# Patient Record
Sex: FEMALE | Race: WHITE | Employment: PART TIME | URBAN - METROPOLITAN AREA
[De-identification: names, ages, dates, MRNs, and addresses within clinical notes are randomized per-mention and may not be internally consistent; named-entity substitution may affect disease eponyms.]

---

## 2017-01-01 ENCOUNTER — ONCOLOGY VISIT (OUTPATIENT)
Dept: ONCOLOGY | Facility: CLINIC | Age: 77
End: 2017-01-01
Attending: INTERNAL MEDICINE
Payer: MEDICARE

## 2017-01-01 ENCOUNTER — TELEPHONE (OUTPATIENT)
Dept: ONCOLOGY | Facility: CLINIC | Age: 77
End: 2017-01-01

## 2017-01-01 ENCOUNTER — HOSPITAL ENCOUNTER (INPATIENT)
Facility: CLINIC | Age: 77
LOS: 5 days | Discharge: NURSING, REHAB OR LONG-TERM CARE | DRG: 070 | End: 2017-02-23
Attending: EMERGENCY MEDICINE | Admitting: INTERNAL MEDICINE
Payer: MEDICARE

## 2017-01-01 ENCOUNTER — APPOINTMENT (OUTPATIENT)
Dept: PHYSICAL THERAPY | Facility: CLINIC | Age: 77
DRG: 070 | End: 2017-01-01
Payer: MEDICARE

## 2017-01-01 ENCOUNTER — OFFICE VISIT (OUTPATIENT)
Dept: NEUROSURGERY | Facility: CLINIC | Age: 77
End: 2017-01-01
Attending: NEUROLOGICAL SURGERY
Payer: MEDICARE

## 2017-01-01 ENCOUNTER — CARE COORDINATION (OUTPATIENT)
Dept: CARDIOLOGY | Facility: CLINIC | Age: 77
End: 2017-01-01

## 2017-01-01 ENCOUNTER — TRANSFERRED RECORDS (OUTPATIENT)
Dept: HEALTH INFORMATION MANAGEMENT | Facility: CLINIC | Age: 77
End: 2017-01-01

## 2017-01-01 ENCOUNTER — APPOINTMENT (OUTPATIENT)
Dept: PHYSICAL THERAPY | Facility: CLINIC | Age: 77
DRG: 070 | End: 2017-01-01
Attending: INTERNAL MEDICINE
Payer: MEDICARE

## 2017-01-01 ENCOUNTER — NURSING HOME VISIT (OUTPATIENT)
Dept: GERIATRICS | Facility: CLINIC | Age: 77
End: 2017-01-01
Payer: COMMERCIAL

## 2017-01-01 ENCOUNTER — APPOINTMENT (OUTPATIENT)
Dept: MRI IMAGING | Facility: CLINIC | Age: 77
DRG: 070 | End: 2017-01-01
Payer: MEDICARE

## 2017-01-01 ENCOUNTER — APPOINTMENT (OUTPATIENT)
Dept: LAB | Facility: CLINIC | Age: 77
End: 2017-01-01
Attending: INTERNAL MEDICINE
Payer: MEDICARE

## 2017-01-01 ENCOUNTER — APPOINTMENT (OUTPATIENT)
Dept: MRI IMAGING | Facility: CLINIC | Age: 77
DRG: 070 | End: 2017-01-01
Attending: EMERGENCY MEDICINE
Payer: MEDICARE

## 2017-01-01 ENCOUNTER — TELEPHONE (OUTPATIENT)
Dept: INTERNAL MEDICINE | Facility: CLINIC | Age: 77
End: 2017-01-01

## 2017-01-01 ENCOUNTER — CARE COORDINATION (OUTPATIENT)
Dept: ONCOLOGY | Facility: CLINIC | Age: 77
End: 2017-01-01

## 2017-01-01 ENCOUNTER — APPOINTMENT (OUTPATIENT)
Dept: SPEECH THERAPY | Facility: CLINIC | Age: 77
DRG: 070 | End: 2017-01-01
Payer: MEDICARE

## 2017-01-01 ENCOUNTER — APPOINTMENT (OUTPATIENT)
Dept: MRI IMAGING | Facility: CLINIC | Age: 77
DRG: 070 | End: 2017-01-01
Attending: INTERNAL MEDICINE
Payer: MEDICARE

## 2017-01-01 ENCOUNTER — DOCUMENTATION ONLY (OUTPATIENT)
Dept: CARE COORDINATION | Facility: CLINIC | Age: 77
End: 2017-01-01

## 2017-01-01 ENCOUNTER — EXTERNAL ORDER RESULTS (OUTPATIENT)
Dept: GERIATRICS | Facility: CLINIC | Age: 77
End: 2017-01-01

## 2017-01-01 ENCOUNTER — ONCOLOGY VISIT (OUTPATIENT)
Dept: ONCOLOGY | Facility: CLINIC | Age: 77
End: 2017-01-01
Attending: PHYSICIAN ASSISTANT
Payer: MEDICARE

## 2017-01-01 VITALS
BODY MASS INDEX: 15.57 KG/M2 | HEIGHT: 62 IN | RESPIRATION RATE: 19 BRPM | HEART RATE: 94 BPM | OXYGEN SATURATION: 97 % | WEIGHT: 84.6 LBS | TEMPERATURE: 97.5 F | SYSTOLIC BLOOD PRESSURE: 100 MMHG | DIASTOLIC BLOOD PRESSURE: 67 MMHG

## 2017-01-01 VITALS
DIASTOLIC BLOOD PRESSURE: 78 MMHG | BODY MASS INDEX: 17.3 KG/M2 | HEIGHT: 62 IN | SYSTOLIC BLOOD PRESSURE: 122 MMHG | RESPIRATION RATE: 18 BRPM | OXYGEN SATURATION: 95 % | WEIGHT: 94 LBS | HEART RATE: 90 BPM | TEMPERATURE: 97.8 F

## 2017-01-01 VITALS
WEIGHT: 95.1 LBS | SYSTOLIC BLOOD PRESSURE: 120 MMHG | BODY MASS INDEX: 17.11 KG/M2 | RESPIRATION RATE: 16 BRPM | OXYGEN SATURATION: 98 % | TEMPERATURE: 96.9 F | DIASTOLIC BLOOD PRESSURE: 61 MMHG | HEART RATE: 83 BPM

## 2017-01-01 VITALS
HEART RATE: 92 BPM | RESPIRATION RATE: 14 BRPM | BODY MASS INDEX: 15.18 KG/M2 | WEIGHT: 83 LBS | OXYGEN SATURATION: 93 % | SYSTOLIC BLOOD PRESSURE: 115 MMHG | DIASTOLIC BLOOD PRESSURE: 70 MMHG | TEMPERATURE: 97.5 F

## 2017-01-01 VITALS
RESPIRATION RATE: 22 BRPM | SYSTOLIC BLOOD PRESSURE: 112 MMHG | BODY MASS INDEX: 14.78 KG/M2 | HEART RATE: 88 BPM | DIASTOLIC BLOOD PRESSURE: 74 MMHG | OXYGEN SATURATION: 98 % | TEMPERATURE: 97.9 F | WEIGHT: 80.8 LBS

## 2017-01-01 VITALS
OXYGEN SATURATION: 97 % | TEMPERATURE: 99.1 F | DIASTOLIC BLOOD PRESSURE: 62 MMHG | SYSTOLIC BLOOD PRESSURE: 110 MMHG | WEIGHT: 98.2 LBS | BODY MASS INDEX: 17.66 KG/M2 | HEART RATE: 87 BPM

## 2017-01-01 VITALS
RESPIRATION RATE: 20 BRPM | OXYGEN SATURATION: 98 % | TEMPERATURE: 97.8 F | HEART RATE: 94 BPM | SYSTOLIC BLOOD PRESSURE: 125 MMHG | DIASTOLIC BLOOD PRESSURE: 78 MMHG

## 2017-01-01 VITALS
HEART RATE: 88 BPM | SYSTOLIC BLOOD PRESSURE: 131 MMHG | RESPIRATION RATE: 16 BRPM | TEMPERATURE: 98.1 F | DIASTOLIC BLOOD PRESSURE: 72 MMHG | OXYGEN SATURATION: 95 % | WEIGHT: 109.9 LBS | BODY MASS INDEX: 19.78 KG/M2

## 2017-01-01 VITALS
WEIGHT: 103.6 LBS | TEMPERATURE: 98.1 F | HEART RATE: 105 BPM | RESPIRATION RATE: 16 BRPM | HEIGHT: 62 IN | OXYGEN SATURATION: 100 % | SYSTOLIC BLOOD PRESSURE: 109 MMHG | DIASTOLIC BLOOD PRESSURE: 77 MMHG | BODY MASS INDEX: 19.06 KG/M2

## 2017-01-01 VITALS
DIASTOLIC BLOOD PRESSURE: 71 MMHG | BODY MASS INDEX: 17.3 KG/M2 | HEART RATE: 91 BPM | RESPIRATION RATE: 14 BRPM | TEMPERATURE: 97.8 F | OXYGEN SATURATION: 99 % | WEIGHT: 96.2 LBS | SYSTOLIC BLOOD PRESSURE: 111 MMHG

## 2017-01-01 VITALS — BODY MASS INDEX: 17.66 KG/M2 | WEIGHT: 98.2 LBS

## 2017-01-01 VITALS
TEMPERATURE: 98.9 F | WEIGHT: 74.4 LBS | OXYGEN SATURATION: 91 % | HEART RATE: 88 BPM | DIASTOLIC BLOOD PRESSURE: 49 MMHG | BODY MASS INDEX: 13.61 KG/M2 | SYSTOLIC BLOOD PRESSURE: 92 MMHG

## 2017-01-01 VITALS
TEMPERATURE: 97 F | DIASTOLIC BLOOD PRESSURE: 76 MMHG | WEIGHT: 86.7 LBS | SYSTOLIC BLOOD PRESSURE: 117 MMHG | RESPIRATION RATE: 16 BRPM | BODY MASS INDEX: 15.86 KG/M2 | OXYGEN SATURATION: 95 % | HEART RATE: 89 BPM

## 2017-01-01 VITALS
DIASTOLIC BLOOD PRESSURE: 66 MMHG | SYSTOLIC BLOOD PRESSURE: 109 MMHG | HEART RATE: 72 BPM | OXYGEN SATURATION: 96 % | HEIGHT: 62 IN | WEIGHT: 93.6 LBS | TEMPERATURE: 98.2 F | RESPIRATION RATE: 12 BRPM | BODY MASS INDEX: 17.23 KG/M2

## 2017-01-01 DIAGNOSIS — Z51.5 HOSPICE CARE: ICD-10-CM

## 2017-01-01 DIAGNOSIS — R53.81 PHYSICAL DECONDITIONING: ICD-10-CM

## 2017-01-01 DIAGNOSIS — C50.011 CANCER OF NIPPLE OF RIGHT BREAST (H): Primary | ICD-10-CM

## 2017-01-01 DIAGNOSIS — S50.822A: Primary | ICD-10-CM

## 2017-01-01 DIAGNOSIS — C79.51 BONE METASTASES: ICD-10-CM

## 2017-01-01 DIAGNOSIS — N39.0 URINARY TRACT INFECTION WITH HEMATURIA, SITE UNSPECIFIED: Primary | ICD-10-CM

## 2017-01-01 DIAGNOSIS — R40.0 SOMNOLENCE: ICD-10-CM

## 2017-01-01 DIAGNOSIS — Z51.11 ENCOUNTER FOR ANTINEOPLASTIC CHEMOTHERAPY: ICD-10-CM

## 2017-01-01 DIAGNOSIS — C79.51 BONE METASTASES: Primary | ICD-10-CM

## 2017-01-01 DIAGNOSIS — R40.0 SOMNOLENCE: Primary | ICD-10-CM

## 2017-01-01 DIAGNOSIS — R62.7 ADULT FAILURE TO THRIVE: ICD-10-CM

## 2017-01-01 DIAGNOSIS — C50.011 CANCER OF NIPPLE OF RIGHT BREAST (H): ICD-10-CM

## 2017-01-01 DIAGNOSIS — C79.31 BRAIN METASTASES: ICD-10-CM

## 2017-01-01 DIAGNOSIS — C50.919 METASTATIC BREAST CANCER: Primary | ICD-10-CM

## 2017-01-01 DIAGNOSIS — M62.81 GENERALIZED MUSCLE WEAKNESS: ICD-10-CM

## 2017-01-01 DIAGNOSIS — Z79.899 ENCOUNTER FOR LONG-TERM CURRENT USE OF MEDICATION: ICD-10-CM

## 2017-01-01 DIAGNOSIS — D61.818 PANCYTOPENIA (H): ICD-10-CM

## 2017-01-01 DIAGNOSIS — S50.322A: Primary | ICD-10-CM

## 2017-01-01 DIAGNOSIS — K59.00 CONSTIPATION, UNSPECIFIED CONSTIPATION TYPE: ICD-10-CM

## 2017-01-01 DIAGNOSIS — R53.1 WEAKNESS OF LEFT SIDE OF BODY: ICD-10-CM

## 2017-01-01 DIAGNOSIS — C79.31 BRAIN METASTASES: Primary | ICD-10-CM

## 2017-01-01 DIAGNOSIS — C79.31 METASTASIS TO BRAIN (H): ICD-10-CM

## 2017-01-01 DIAGNOSIS — Z79.899 NEED FOR PROPHYLACTIC CHEMOTHERAPY: ICD-10-CM

## 2017-01-01 DIAGNOSIS — R53.1 ACUTE RIGHT-SIDED WEAKNESS: ICD-10-CM

## 2017-01-01 DIAGNOSIS — N30.01 ACUTE CYSTITIS WITH HEMATURIA: ICD-10-CM

## 2017-01-01 DIAGNOSIS — G40.909 SEIZURE DISORDER (H): ICD-10-CM

## 2017-01-01 DIAGNOSIS — C50.911 DUCTAL CARCINOMA OF BREAST, RIGHT (H): ICD-10-CM

## 2017-01-01 DIAGNOSIS — R11.2 CHEMOTHERAPY INDUCED NAUSEA AND VOMITING: ICD-10-CM

## 2017-01-01 DIAGNOSIS — I10 BENIGN ESSENTIAL HYPERTENSION: ICD-10-CM

## 2017-01-01 DIAGNOSIS — S61.219A CUT OF FINGER: ICD-10-CM

## 2017-01-01 DIAGNOSIS — C79.51 BONE METASTASIS: ICD-10-CM

## 2017-01-01 DIAGNOSIS — R31.9 URINARY TRACT INFECTION WITH HEMATURIA, SITE UNSPECIFIED: Primary | ICD-10-CM

## 2017-01-01 DIAGNOSIS — G62.9 NEUROPATHY: ICD-10-CM

## 2017-01-01 DIAGNOSIS — C50.911 MALIGNANT NEOPLASM OF RIGHT FEMALE BREAST, UNSPECIFIED SITE OF BREAST: ICD-10-CM

## 2017-01-01 DIAGNOSIS — S60.822A: Primary | ICD-10-CM

## 2017-01-01 DIAGNOSIS — R41.0 CONFUSION: ICD-10-CM

## 2017-01-01 DIAGNOSIS — L08.9: Primary | ICD-10-CM

## 2017-01-01 DIAGNOSIS — N30.01 ACUTE CYSTITIS WITH HEMATURIA: Primary | ICD-10-CM

## 2017-01-01 DIAGNOSIS — R62.7 FAILURE TO THRIVE IN ADULT: ICD-10-CM

## 2017-01-01 DIAGNOSIS — C50.911 BREAST CANCER, RIGHT BREAST (H): ICD-10-CM

## 2017-01-01 DIAGNOSIS — T45.1X5A CHEMOTHERAPY INDUCED NAUSEA AND VOMITING: ICD-10-CM

## 2017-01-01 LAB
ALBUMIN SERPL-MCNC: 3 G/DL (ref 3.4–5)
ALBUMIN SERPL-MCNC: 3.1 G/DL (ref 3.4–5)
ALBUMIN UR-MCNC: 10 MG/DL
ALP SERPL-CCNC: 64 U/L (ref 40–150)
ALP SERPL-CCNC: 64 U/L (ref 40–150)
ALP SERPL-CCNC: 68 U/L (ref 40–150)
ALP SERPL-CCNC: 80 U/L (ref 40–150)
ALT SERPL W P-5'-P-CCNC: 22 U/L (ref 0–50)
ALT SERPL W P-5'-P-CCNC: 25 U/L (ref 0–50)
ALT SERPL W P-5'-P-CCNC: 28 U/L (ref 0–50)
ALT SERPL W P-5'-P-CCNC: 37 U/L (ref 0–50)
ANION GAP SERPL CALCULATED.3IONS-SCNC: 7 MMOL/L (ref 3–14)
ANION GAP SERPL CALCULATED.3IONS-SCNC: 8 MMOL/L (ref 3–14)
ANION GAP SERPL CALCULATED.3IONS-SCNC: 9 MMOL/L (ref 3–14)
APPEARANCE CSF: CLEAR
APPEARANCE UR: ABNORMAL
AST SERPL W P-5'-P-CCNC: 19 U/L (ref 0–45)
AST SERPL W P-5'-P-CCNC: 20 U/L (ref 0–45)
AST SERPL W P-5'-P-CCNC: 22 U/L (ref 0–45)
AST SERPL W P-5'-P-CCNC: 27 U/L (ref 0–45)
BACTERIA #/AREA URNS HPF: ABNORMAL /HPF
BACTERIA SPEC CULT: NO GROWTH
BACTERIA SPEC CULT: NO GROWTH
BASOPHILS # BLD AUTO: 0 10E9/L (ref 0–0.2)
BASOPHILS NFR BLD AUTO: 0 %
BASOPHILS NFR BLD AUTO: 0.2 %
BASOPHILS NFR BLD AUTO: 0.3 %
BASOPHILS NFR BLD AUTO: 0.3 %
BASOPHILS NFR BLD AUTO: 0.4 %
BASOPHILS NFR BLD AUTO: 0.5 %
BASOPHILS NFR BLD AUTO: 0.6 %
BILIRUB SERPL-MCNC: 0.8 MG/DL (ref 0.2–1.3)
BILIRUB SERPL-MCNC: 0.9 MG/DL (ref 0.2–1.3)
BILIRUB SERPL-MCNC: 0.9 MG/DL (ref 0.2–1.3)
BILIRUB SERPL-MCNC: 1.2 MG/DL (ref 0.2–1.3)
BILIRUB UR QL STRIP: NEGATIVE
BUN SERPL-MCNC: 12 MG/DL (ref 7–30)
BUN SERPL-MCNC: 13 MG/DL (ref 7–30)
BUN SERPL-MCNC: 15 MG/DL (ref 7–30)
BUN SERPL-MCNC: 15 MG/DL (ref 7–30)
BUN SERPL-MCNC: 21 MG/DL (ref 7–30)
BUN SERPL-MCNC: 21 MG/DL (ref 9–26)
BUN SERPL-MCNC: 3 MG/DL (ref 7–30)
BUN SERPL-MCNC: 4 MG/DL (ref 7–30)
BUN SERPL-MCNC: 5 MG/DL (ref 7–30)
BUN SERPL-MCNC: 8 MG/DL (ref 7–30)
CALCIUM SERPL-MCNC: 7.2 MG/DL (ref 8.5–10.1)
CALCIUM SERPL-MCNC: 7.5 MG/DL (ref 8.5–10.1)
CALCIUM SERPL-MCNC: 7.8 MG/DL (ref 8.5–10.1)
CALCIUM SERPL-MCNC: 7.9 MG/DL (ref 8.5–10.1)
CALCIUM SERPL-MCNC: 8.5 MG/DL (ref 8.5–10.1)
CALCIUM SERPL-MCNC: 8.7 MG/DL (ref 8.5–10.1)
CALCIUM SERPL-MCNC: 9 MG/DL (ref 8.4–10.2)
CALCIUM SERPL-MCNC: 9 MG/DL (ref 8.5–10.1)
CANCER AG27-29 SERPL-ACNC: 60 U/ML (ref 0–39)
CANCER AG27-29 SERPL-ACNC: 62 U/ML (ref 0–39)
CANCER AG27-29 SERPL-ACNC: 66 U/ML (ref 0–39)
CANCER AG27-29 SERPL-ACNC: 91 U/ML (ref 0–39)
CD3 CELLS # BLD: 307 CELLS/UL (ref 603–2990)
CD3 CELLS NFR BLD: 91 % (ref 49–84)
CD3+CD4+ CELLS # BLD: 214 CELLS/UL (ref 441–2156)
CD3+CD4+ CELLS NFR BLD: 63 % (ref 28–63)
CD3+CD4+ CELLS/CD3+CD8+ CLL BLD: 2.33 % (ref 1.4–2.6)
CD3+CD8+ CELLS # BLD: 90 CELLS/UL (ref 125–1312)
CD3+CD8+ CELLS NFR BLD: 27 % (ref 10–40)
CEA SERPL-MCNC: 5.5 UG/L (ref 0–2.5)
CEA SERPL-MCNC: 6.6 UG/L (ref 0–2.5)
CEA SERPL-MCNC: 7.3 UG/L (ref 0–2.5)
CEA SERPL-MCNC: 8.8 UG/L (ref 0–2.5)
CHLORIDE SERPL-SCNC: 108 MMOL/L (ref 94–109)
CHLORIDE SERPL-SCNC: 109 MMOL/L (ref 94–109)
CHLORIDE SERPL-SCNC: 110 MMOL/L (ref 94–109)
CHLORIDE SERPL-SCNC: 112 MMOL/L (ref 94–109)
CHLORIDE SERPL-SCNC: 112 MMOL/L (ref 94–109)
CHLORIDE SERPL-SCNC: 113 MMOL/L (ref 94–109)
CHLORIDE SERPL-SCNC: 116 MMOL/L (ref 94–109)
CHLORIDE SERPLBLD-SCNC: 111 MMOL/L (ref 98–109)
CO2 SERPL-SCNC: 20 MMOL/L (ref 20–32)
CO2 SERPL-SCNC: 22 MMOL/L (ref 20–32)
CO2 SERPL-SCNC: 23 MMOL/L (ref 20–32)
CO2 SERPL-SCNC: 23 MMOL/L (ref 22–31)
CO2 SERPL-SCNC: 24 MMOL/L (ref 20–32)
CO2 SERPL-SCNC: 25 MMOL/L (ref 20–32)
CO2 SERPL-SCNC: 26 MMOL/L (ref 20–32)
CO2 SERPL-SCNC: 27 MMOL/L (ref 20–32)
CO2 SERPL-SCNC: 27 MMOL/L (ref 20–32)
CO2 SERPL-SCNC: 28 MMOL/L (ref 20–32)
COLOR CSF: COLORLESS
COLOR UR AUTO: YELLOW
COPATH REPORT: NORMAL
CREAT SERPL-MCNC: 0.33 MG/DL (ref 0.52–1.04)
CREAT SERPL-MCNC: 0.34 MG/DL (ref 0.52–1.04)
CREAT SERPL-MCNC: 0.35 MG/DL (ref 0.52–1.04)
CREAT SERPL-MCNC: 0.36 MG/DL (ref 0.52–1.04)
CREAT SERPL-MCNC: 0.37 MG/DL (ref 0.52–1.04)
CREAT SERPL-MCNC: 0.38 MG/DL (ref 0.52–1.04)
CREAT SERPL-MCNC: 0.39 MG/DL (ref 0.52–1.04)
CREAT SERPL-MCNC: 0.4 MG/DL (ref 0.52–1.04)
CREAT SERPL-MCNC: 0.4 MG/DL (ref 0.55–1.02)
CREAT SERPL-MCNC: 0.52 MG/DL (ref 0.52–1.04)
DIFFERENTIAL METHOD BLD: ABNORMAL
EOSINOPHIL # BLD AUTO: 0 10E9/L (ref 0–0.7)
EOSINOPHIL # BLD AUTO: 0.1 10E9/L (ref 0–0.7)
EOSINOPHIL NFR BLD AUTO: 1 %
EOSINOPHIL NFR BLD AUTO: 1.1 %
EOSINOPHIL NFR BLD AUTO: 1.3 %
EOSINOPHIL NFR BLD AUTO: 1.3 %
EOSINOPHIL NFR BLD AUTO: 1.8 %
EOSINOPHIL NFR BLD AUTO: 2.3 %
EOSINOPHIL NFR BLD AUTO: 2.7 %
ERYTHROCYTE [DISTWIDTH] IN BLOOD BY AUTOMATED COUNT: 15.3 % (ref 10–15)
ERYTHROCYTE [DISTWIDTH] IN BLOOD BY AUTOMATED COUNT: 16.1 % (ref 11–15)
ERYTHROCYTE [DISTWIDTH] IN BLOOD BY AUTOMATED COUNT: 16.8 % (ref 10–15)
ERYTHROCYTE [DISTWIDTH] IN BLOOD BY AUTOMATED COUNT: 16.9 % (ref 10–15)
ERYTHROCYTE [DISTWIDTH] IN BLOOD BY AUTOMATED COUNT: 17 % (ref 10–15)
ERYTHROCYTE [DISTWIDTH] IN BLOOD BY AUTOMATED COUNT: 17.1 % (ref 10–15)
ERYTHROCYTE [DISTWIDTH] IN BLOOD BY AUTOMATED COUNT: 17.5 % (ref 10–15)
ERYTHROCYTE [DISTWIDTH] IN BLOOD BY AUTOMATED COUNT: 17.9 % (ref 10–15)
ERYTHROCYTE [DISTWIDTH] IN BLOOD BY AUTOMATED COUNT: 18 % (ref 10–15)
ERYTHROCYTE [DISTWIDTH] IN BLOOD BY AUTOMATED COUNT: 19.3 % (ref 10–15)
GFR SERPL CREATININE-BSD FRML MDRD: >60 ML/MIN/1.73M2
GFR SERPL CREATININE-BSD FRML MDRD: ABNORMAL ML/MIN/1.7M2
GLUCOSE BLDC GLUCOMTR-MCNC: 84 MG/DL (ref 70–99)
GLUCOSE CSF-MCNC: 49 MG/DL (ref 40–70)
GLUCOSE SERPL-MCNC: 105 MG/DL (ref 70–100)
GLUCOSE SERPL-MCNC: 111 MG/DL (ref 70–99)
GLUCOSE SERPL-MCNC: 123 MG/DL (ref 70–99)
GLUCOSE SERPL-MCNC: 130 MG/DL (ref 70–99)
GLUCOSE SERPL-MCNC: 137 MG/DL (ref 70–99)
GLUCOSE SERPL-MCNC: 76 MG/DL (ref 70–99)
GLUCOSE SERPL-MCNC: 84 MG/DL (ref 70–99)
GLUCOSE SERPL-MCNC: 88 MG/DL (ref 70–99)
GLUCOSE SERPL-MCNC: 92 MG/DL (ref 70–99)
GLUCOSE SERPL-MCNC: 94 MG/DL (ref 70–99)
GLUCOSE UR STRIP-MCNC: NEGATIVE MG/DL
GRAM STN SPEC: NORMAL
HCT VFR BLD AUTO: 22.9 % (ref 35–47)
HCT VFR BLD AUTO: 23.5 % (ref 35–47)
HCT VFR BLD AUTO: 24.9 % (ref 35–47)
HCT VFR BLD AUTO: 26.5 % (ref 35–47)
HCT VFR BLD AUTO: 27.4 % (ref 35–47)
HCT VFR BLD AUTO: 27.5 % (ref 35–47)
HCT VFR BLD AUTO: 27.6 % (ref 35–47)
HCT VFR BLD AUTO: 31.9 % (ref 35–47)
HCT VFR BLD AUTO: 32.9 % (ref 35–46)
HCT VFR BLD AUTO: 34.1 % (ref 35–47)
HEMOGLOBIN: 10.6 G/DL (ref 11.8–15.5)
HGB BLD-MCNC: 10.9 G/DL (ref 11.7–15.7)
HGB BLD-MCNC: 11 G/DL (ref 11.7–15.7)
HGB BLD-MCNC: 7.5 G/DL (ref 11.7–15.7)
HGB BLD-MCNC: 7.8 G/DL (ref 11.7–15.7)
HGB BLD-MCNC: 8.4 G/DL (ref 11.7–15.7)
HGB BLD-MCNC: 8.8 G/DL (ref 11.7–15.7)
HGB BLD-MCNC: 9 G/DL (ref 11.7–15.7)
HGB BLD-MCNC: 9.1 G/DL (ref 11.7–15.7)
HGB BLD-MCNC: 9.3 G/DL (ref 11.7–15.7)
HGB UR QL STRIP: ABNORMAL
HIV 1+2 AB+HIV1 P24 AG SERPL QL IA: NORMAL
HSV1 DNA CSF QL NAA+PROBE: NORMAL
HSV2 DNA CSF QL NAA+PROBE: NORMAL
HYALINE CASTS #/AREA URNS LPF: 1 /LPF (ref 0–2)
IFC SPECIMEN: ABNORMAL
IMM GRANULOCYTES # BLD: 0 10E9/L (ref 0–0.4)
IMM GRANULOCYTES NFR BLD: 0.3 %
IMM GRANULOCYTES NFR BLD: 0.4 %
IMM GRANULOCYTES NFR BLD: 0.5 %
IMM GRANULOCYTES NFR BLD: 0.5 %
IMM GRANULOCYTES NFR BLD: 0.6 %
IMM GRANULOCYTES NFR BLD: 0.6 %
IMM GRANULOCYTES NFR BLD: 0.7 %
IMMUNODEFICIENCY MARKERS SPEC-IMP: ABNORMAL
INR PPP: 1.08 (ref 0.86–1.14)
KETONES UR STRIP-MCNC: 10 MG/DL
LEUKOCYTE ESTERASE UR QL STRIP: ABNORMAL
LYMPHOCYTES # BLD AUTO: 0.3 10E9/L (ref 0.8–5.3)
LYMPHOCYTES # BLD AUTO: 0.3 10E9/L (ref 0.8–5.3)
LYMPHOCYTES # BLD AUTO: 0.4 10E9/L (ref 0.8–5.3)
LYMPHOCYTES # BLD AUTO: 0.5 10E9/L (ref 0.8–5.3)
LYMPHOCYTES NFR BLD AUTO: 10.3 %
LYMPHOCYTES NFR BLD AUTO: 11.8 %
LYMPHOCYTES NFR BLD AUTO: 12.9 %
LYMPHOCYTES NFR BLD AUTO: 13.1 %
LYMPHOCYTES NFR BLD AUTO: 15.7 %
LYMPHOCYTES NFR BLD AUTO: 19.6 %
LYMPHOCYTES NFR BLD AUTO: 9.7 %
MCH RBC QN AUTO: 32 PG (ref 26.5–33)
MCH RBC QN AUTO: 32.7 PG (ref 26.5–33)
MCH RBC QN AUTO: 32.9 PG (ref 26.5–33)
MCH RBC QN AUTO: 32.9 PG (ref 26.5–33)
MCH RBC QN AUTO: 33 PG (ref 26.5–33)
MCH RBC QN AUTO: 33.6 PG (ref 26.5–33)
MCH RBC QN AUTO: 33.6 PG (ref 26.5–33)
MCH RBC QN AUTO: 33.7 PG (ref 26.5–33)
MCH RBC QN AUTO: 33.8 PG (ref 26.5–33)
MCHC RBC AUTO-ENTMCNC: 32.3 G/DL (ref 31.5–36.5)
MCHC RBC AUTO-ENTMCNC: 32.6 G/DL (ref 31.5–36.5)
MCHC RBC AUTO-ENTMCNC: 32.8 G/DL (ref 31.5–36.5)
MCHC RBC AUTO-ENTMCNC: 33.2 G/DL (ref 31.5–36.5)
MCHC RBC AUTO-ENTMCNC: 33.7 G/DL (ref 31.5–36.5)
MCHC RBC AUTO-ENTMCNC: 33.8 G/DL (ref 31.5–36.5)
MCHC RBC AUTO-ENTMCNC: 34.2 G/DL (ref 31.5–36.5)
MCV RBC AUTO: 100 FL (ref 78–100)
MCV RBC AUTO: 100 FL (ref 78–100)
MCV RBC AUTO: 100.6 FL (ref 80–100)
MCV RBC AUTO: 101 FL (ref 78–100)
MCV RBC AUTO: 102 FL (ref 78–100)
MCV RBC AUTO: 102 FL (ref 78–100)
MCV RBC AUTO: 94 FL (ref 78–100)
MCV RBC AUTO: 97 FL (ref 78–100)
MICRO REPORT STATUS: NORMAL
MICROBIOLOGIST REVIEW: NORMAL
MONOCYTES # BLD AUTO: 0.4 10E9/L (ref 0–1.3)
MONOCYTES # BLD AUTO: 0.5 10E9/L (ref 0–1.3)
MONOCYTES NFR BLD AUTO: 11 %
MONOCYTES NFR BLD AUTO: 12.9 %
MONOCYTES NFR BLD AUTO: 13.2 %
MONOCYTES NFR BLD AUTO: 13.5 %
MONOCYTES NFR BLD AUTO: 14.2 %
MONOCYTES NFR BLD AUTO: 20.7 %
MONOCYTES NFR BLD AUTO: 23.8 %
MUCOUS THREADS #/AREA URNS LPF: PRESENT /LPF
NEUTROPHILS # BLD AUTO: 1.1 10E9/L (ref 1.6–8.3)
NEUTROPHILS # BLD AUTO: 1.4 10E9/L (ref 1.6–8.3)
NEUTROPHILS # BLD AUTO: 1.8 10E9/L (ref 1.6–8.3)
NEUTROPHILS # BLD AUTO: 2.1 10E9/L (ref 1.6–8.3)
NEUTROPHILS # BLD AUTO: 2.1 10E9/L (ref 1.6–8.3)
NEUTROPHILS # BLD AUTO: 2.3 10E9/L (ref 1.6–8.3)
NEUTROPHILS # BLD AUTO: 3.6 10E9/L (ref 1.6–8.3)
NEUTROPHILS NFR BLD AUTO: 53.3 %
NEUTROPHILS NFR BLD AUTO: 63.9 %
NEUTROPHILS NFR BLD AUTO: 68.2 %
NEUTROPHILS NFR BLD AUTO: 71.4 %
NEUTROPHILS NFR BLD AUTO: 72.2 %
NEUTROPHILS NFR BLD AUTO: 74 %
NEUTROPHILS NFR BLD AUTO: 77.2 %
NITRATE UR QL: NEGATIVE
NRBC # BLD AUTO: 0 10*3/UL
NRBC BLD AUTO-RTO: 0 /100
NRBC BLD AUTO-RTO: 1 /100
PH UR STRIP: 5.5 PH (ref 5–7)
PLATELET # BLD AUTO: 107 10E9/L (ref 150–450)
PLATELET # BLD AUTO: 115 10E9/L (ref 150–450)
PLATELET # BLD AUTO: 123 10E9/L (ref 150–450)
PLATELET # BLD AUTO: 158 K/CMM (ref 140–450)
PLATELET # BLD AUTO: 203 10E9/L (ref 150–450)
PLATELET # BLD AUTO: 48 10E9/L (ref 150–450)
PLATELET # BLD AUTO: 56 10E9/L (ref 150–450)
PLATELET # BLD AUTO: 56 10E9/L (ref 150–450)
PLATELET # BLD AUTO: 57 10E9/L (ref 150–450)
PLATELET # BLD AUTO: 68 10E9/L (ref 150–450)
PLATELET # BLD AUTO: 81 10E9/L (ref 150–450)
PLATELET # BLD EST: ABNORMAL 10*3/UL
PLATELET # BLD EST: ABNORMAL 10*3/UL
POTASSIUM SERPL-SCNC: 3.2 MMOL/L (ref 3.4–5.3)
POTASSIUM SERPL-SCNC: 3.6 MMOL/L (ref 3.4–5.3)
POTASSIUM SERPL-SCNC: 3.7 MMOL/L (ref 3.4–5.3)
POTASSIUM SERPL-SCNC: 3.8 MMOL/L (ref 3.4–5.3)
POTASSIUM SERPL-SCNC: 3.9 MMOL/L (ref 3.4–5.3)
POTASSIUM SERPL-SCNC: 3.9 MMOL/L (ref 3.4–5.3)
POTASSIUM SERPL-SCNC: 4.1 MMOL/L (ref 3.5–5.2)
PROT CSF-MCNC: 46 MG/DL (ref 15–60)
PROT SERPL-MCNC: 5.9 G/DL (ref 6.8–8.8)
PROT SERPL-MCNC: 6 G/DL (ref 6.8–8.8)
PROT SERPL-MCNC: 6 G/DL (ref 6.8–8.8)
PROT SERPL-MCNC: 6.2 G/DL (ref 6.8–8.8)
RBC # BLD AUTO: 2.28 10E12/L (ref 3.8–5.2)
RBC # BLD AUTO: 2.32 10E12/L (ref 3.8–5.2)
RBC # BLD AUTO: 2.49 10E12/L (ref 3.8–5.2)
RBC # BLD AUTO: 2.62 10E12/L (ref 3.8–5.2)
RBC # BLD AUTO: 2.69 10E12/L (ref 3.8–5.2)
RBC # BLD AUTO: 2.73 10E12/L (ref 3.8–5.2)
RBC # BLD AUTO: 2.84 10E12/L (ref 3.8–5.2)
RBC # BLD AUTO: 3.27 M/CMM (ref 3.7–5.2)
RBC # BLD AUTO: 3.34 10E12/L (ref 3.8–5.2)
RBC # BLD AUTO: 3.41 10E12/L (ref 3.8–5.2)
RBC # CSF MANUAL: 0 /UL (ref 0–2)
RBC #/AREA URNS AUTO: 3 /HPF (ref 0–2)
SODIUM SERPL-SCNC: 143 MMOL/L (ref 133–144)
SODIUM SERPL-SCNC: 143 MMOL/L (ref 133–144)
SODIUM SERPL-SCNC: 143 MMOL/L (ref 136–145)
SODIUM SERPL-SCNC: 144 MMOL/L (ref 133–144)
SODIUM SERPL-SCNC: 145 MMOL/L (ref 133–144)
SODIUM SERPL-SCNC: 145 MMOL/L (ref 133–144)
SP GR UR STRIP: 1.01 (ref 1–1.03)
SPECIMEN SOURCE: NORMAL
SQUAMOUS #/AREA URNS AUTO: <1 /HPF (ref 0–1)
TUBE # CSF: 4 #
URN SPEC COLLECT METH UR: ABNORMAL
UROBILINOGEN UR STRIP-MCNC: NORMAL MG/DL (ref 0–2)
WBC # BLD AUTO: 1.8 10E9/L (ref 4–11)
WBC # BLD AUTO: 2.1 10E9/L (ref 4–11)
WBC # BLD AUTO: 2.1 K/CMM (ref 3.8–11)
WBC # BLD AUTO: 2.2 10E9/L (ref 4–11)
WBC # BLD AUTO: 2.3 10E9/L (ref 4–11)
WBC # BLD AUTO: 2.6 10E9/L (ref 4–11)
WBC # BLD AUTO: 2.9 10E9/L (ref 4–11)
WBC # BLD AUTO: 3 10E9/L (ref 4–11)
WBC # BLD AUTO: 3.1 10E9/L (ref 4–11)
WBC # BLD AUTO: 4.6 10E9/L (ref 4–11)
WBC # CSF MANUAL: 0 /UL (ref 0–5)
WBC #/AREA URNS AUTO: >182 /HPF (ref 0–2)
WBC CLUMPS #/AREA URNS HPF: PRESENT /HPF
ZINC SERPL-MCNC: 53 UG/ML

## 2017-01-01 PROCEDURE — 99232 SBSQ HOSP IP/OBS MODERATE 35: CPT | Performed by: INTERNAL MEDICINE

## 2017-01-01 PROCEDURE — 25000125 ZZHC RX 250: Mod: ZF | Performed by: INTERNAL MEDICINE

## 2017-01-01 PROCEDURE — 85027 COMPLETE CBC AUTOMATED: CPT | Performed by: INTERNAL MEDICINE

## 2017-01-01 PROCEDURE — 25000125 ZZHC RX 250: Performed by: INTERNAL MEDICINE

## 2017-01-01 PROCEDURE — 80048 BASIC METABOLIC PNL TOTAL CA: CPT | Performed by: EMERGENCY MEDICINE

## 2017-01-01 PROCEDURE — 96367 TX/PROPH/DG ADDL SEQ IV INF: CPT

## 2017-01-01 PROCEDURE — 40000193 ZZH STATISTIC PT WARD VISIT

## 2017-01-01 PROCEDURE — 81001 URINALYSIS AUTO W/SCOPE: CPT | Performed by: INTERNAL MEDICINE

## 2017-01-01 PROCEDURE — 99207 ZZC CDG-CORRECTLY CODED, REVIEWED AND AGREE: CPT | Performed by: NURSE PRACTITIONER

## 2017-01-01 PROCEDURE — A9270 NON-COVERED ITEM OR SERVICE: HCPCS | Mod: GY | Performed by: INTERNAL MEDICINE

## 2017-01-01 PROCEDURE — 99212 OFFICE O/P EST SF 10 MIN: CPT

## 2017-01-01 PROCEDURE — 25500064 ZZH RX 255 OP 636: Performed by: EMERGENCY MEDICINE

## 2017-01-01 PROCEDURE — 80053 COMPREHEN METABOLIC PANEL: CPT | Performed by: INTERNAL MEDICINE

## 2017-01-01 PROCEDURE — 99306 1ST NF CARE HIGH MDM 50: CPT | Mod: GV | Performed by: INTERNAL MEDICINE

## 2017-01-01 PROCEDURE — 80048 BASIC METABOLIC PNL TOTAL CA: CPT | Performed by: INTERNAL MEDICINE

## 2017-01-01 PROCEDURE — 88312 SPECIAL STAINS GROUP 1: CPT | Performed by: INTERNAL MEDICINE

## 2017-01-01 PROCEDURE — 99308 SBSQ NF CARE LOW MDM 20: CPT | Mod: GW | Performed by: NURSE PRACTITIONER

## 2017-01-01 PROCEDURE — 72157 MRI CHEST SPINE W/O & W/DYE: CPT

## 2017-01-01 PROCEDURE — 96372 THER/PROPH/DIAG INJ SC/IM: CPT | Mod: 59

## 2017-01-01 PROCEDURE — 70553 MRI BRAIN STEM W/O & W/DYE: CPT

## 2017-01-01 PROCEDURE — 96417 CHEMO IV INFUS EACH ADDL SEQ: CPT

## 2017-01-01 PROCEDURE — 25000128 H RX IP 250 OP 636: Mod: ZF | Performed by: PHYSICIAN ASSISTANT

## 2017-01-01 PROCEDURE — 87086 URINE CULTURE/COLONY COUNT: CPT | Performed by: INTERNAL MEDICINE

## 2017-01-01 PROCEDURE — 25000128 H RX IP 250 OP 636: Performed by: EMERGENCY MEDICINE

## 2017-01-01 PROCEDURE — 86300 IMMUNOASSAY TUMOR CA 15-3: CPT | Performed by: INTERNAL MEDICINE

## 2017-01-01 PROCEDURE — 97530 THERAPEUTIC ACTIVITIES: CPT | Mod: GP

## 2017-01-01 PROCEDURE — 25000128 H RX IP 250 OP 636: Performed by: PHYSICIAN ASSISTANT

## 2017-01-01 PROCEDURE — 99285 EMERGENCY DEPT VISIT HI MDM: CPT | Mod: Z6 | Performed by: EMERGENCY MEDICINE

## 2017-01-01 PROCEDURE — 36592 COLLECT BLOOD FROM PICC: CPT | Performed by: INTERNAL MEDICINE

## 2017-01-01 PROCEDURE — 85025 COMPLETE CBC W/AUTO DIFF WBC: CPT | Performed by: INTERNAL MEDICINE

## 2017-01-01 PROCEDURE — 87186 SC STD MICRODIL/AGAR DIL: CPT | Performed by: INTERNAL MEDICINE

## 2017-01-01 PROCEDURE — A9270 NON-COVERED ITEM OR SERVICE: HCPCS | Mod: GY | Performed by: PHYSICIAN ASSISTANT

## 2017-01-01 PROCEDURE — 88108 CYTOPATH CONCENTRATE TECH: CPT | Performed by: INTERNAL MEDICINE

## 2017-01-01 PROCEDURE — 85025 COMPLETE CBC W/AUTO DIFF WBC: CPT | Performed by: PHYSICIAN ASSISTANT

## 2017-01-01 PROCEDURE — 25000132 ZZH RX MED GY IP 250 OP 250 PS 637: Mod: GY | Performed by: PHYSICIAN ASSISTANT

## 2017-01-01 PROCEDURE — 99207 ZZC CDG-UP CODE -MED NECESSITY: CPT | Performed by: NURSE PRACTITIONER

## 2017-01-01 PROCEDURE — 85610 PROTHROMBIN TIME: CPT | Performed by: INTERNAL MEDICINE

## 2017-01-01 PROCEDURE — A9585 GADOBUTROL INJECTION: HCPCS | Performed by: RADIOLOGY

## 2017-01-01 PROCEDURE — 99207 ZZC CDG-CORRECTLY CODED, REVIEWED AND AGREE: CPT | Performed by: INTERNAL MEDICINE

## 2017-01-01 PROCEDURE — 96365 THER/PROPH/DIAG IV INF INIT: CPT | Performed by: EMERGENCY MEDICINE

## 2017-01-01 PROCEDURE — 36415 COLL VENOUS BLD VENIPUNCTURE: CPT | Performed by: PHYSICIAN ASSISTANT

## 2017-01-01 PROCEDURE — 89050 BODY FLUID CELL COUNT: CPT | Performed by: INTERNAL MEDICINE

## 2017-01-01 PROCEDURE — 80048 BASIC METABOLIC PNL TOTAL CA: CPT | Performed by: PHYSICIAN ASSISTANT

## 2017-01-01 PROCEDURE — 99238 HOSP IP/OBS DSCHRG MGMT 30/<: CPT | Performed by: INTERNAL MEDICINE

## 2017-01-01 PROCEDURE — 99215 OFFICE O/P EST HI 40 MIN: CPT | Mod: ZP | Performed by: INTERNAL MEDICINE

## 2017-01-01 PROCEDURE — 86359 T CELLS TOTAL COUNT: CPT | Performed by: INTERNAL MEDICINE

## 2017-01-01 PROCEDURE — 99214 OFFICE O/P EST MOD 30 MIN: CPT | Mod: ZP | Performed by: INTERNAL MEDICINE

## 2017-01-01 PROCEDURE — 96361 HYDRATE IV INFUSION ADD-ON: CPT | Performed by: EMERGENCY MEDICINE

## 2017-01-01 PROCEDURE — 12000008 ZZH R&B INTERMEDIATE UMMC

## 2017-01-01 PROCEDURE — 82378 CARCINOEMBRYONIC ANTIGEN: CPT | Performed by: INTERNAL MEDICINE

## 2017-01-01 PROCEDURE — 72158 MRI LUMBAR SPINE W/O & W/DYE: CPT

## 2017-01-01 PROCEDURE — 87040 BLOOD CULTURE FOR BACTERIA: CPT | Performed by: INTERNAL MEDICINE

## 2017-01-01 PROCEDURE — 96413 CHEMO IV INFUSION 1 HR: CPT

## 2017-01-01 PROCEDURE — 99309 SBSQ NF CARE MODERATE MDM 30: CPT | Mod: GV | Performed by: INTERNAL MEDICINE

## 2017-01-01 PROCEDURE — 25000125 ZZHC RX 250: Mod: ZF | Performed by: PHYSICIAN ASSISTANT

## 2017-01-01 PROCEDURE — 40000268 ZZH STATISTIC NO CHARGES: Mod: ZF

## 2017-01-01 PROCEDURE — 40000225 ZZH STATISTIC SLP WARD VISIT

## 2017-01-01 PROCEDURE — 25000132 ZZH RX MED GY IP 250 OP 250 PS 637: Mod: GY | Performed by: INTERNAL MEDICINE

## 2017-01-01 PROCEDURE — 00000102 ZZHCL STATISTIC CYTO WRIGHT STAIN TC: Performed by: INTERNAL MEDICINE

## 2017-01-01 PROCEDURE — 97110 THERAPEUTIC EXERCISES: CPT | Mod: GP

## 2017-01-01 PROCEDURE — 87205 SMEAR GRAM STAIN: CPT | Performed by: INTERNAL MEDICINE

## 2017-01-01 PROCEDURE — 36591 DRAW BLOOD OFF VENOUS DEVICE: CPT

## 2017-01-01 PROCEDURE — 99310 SBSQ NF CARE HIGH MDM 45: CPT | Performed by: NURSE PRACTITIONER

## 2017-01-01 PROCEDURE — 99214 OFFICE O/P EST MOD 30 MIN: CPT | Mod: ZP | Performed by: PHYSICIAN ASSISTANT

## 2017-01-01 PROCEDURE — 87088 URINE BACTERIA CULTURE: CPT | Performed by: INTERNAL MEDICINE

## 2017-01-01 PROCEDURE — 25000128 H RX IP 250 OP 636: Performed by: INTERNAL MEDICINE

## 2017-01-01 PROCEDURE — 25000128 H RX IP 250 OP 636: Mod: ZF | Performed by: INTERNAL MEDICINE

## 2017-01-01 PROCEDURE — 82945 GLUCOSE OTHER FLUID: CPT | Performed by: INTERNAL MEDICINE

## 2017-01-01 PROCEDURE — 72156 MRI NECK SPINE W/O & W/DYE: CPT

## 2017-01-01 PROCEDURE — 25500064 ZZH RX 255 OP 636: Performed by: RADIOLOGY

## 2017-01-01 PROCEDURE — 25800025 ZZH RX 258: Performed by: EMERGENCY MEDICINE

## 2017-01-01 PROCEDURE — 86360 T CELL ABSOLUTE COUNT/RATIO: CPT | Performed by: INTERNAL MEDICINE

## 2017-01-01 PROCEDURE — 99233 SBSQ HOSP IP/OBS HIGH 50: CPT | Mod: GC | Performed by: INTERNAL MEDICINE

## 2017-01-01 PROCEDURE — 84630 ASSAY OF ZINC: CPT | Performed by: INTERNAL MEDICINE

## 2017-01-01 PROCEDURE — 87070 CULTURE OTHR SPECIMN AEROBIC: CPT | Performed by: INTERNAL MEDICINE

## 2017-01-01 PROCEDURE — 25000125 ZZHC RX 250: Performed by: PHYSICIAN ASSISTANT

## 2017-01-01 PROCEDURE — 97162 PT EVAL MOD COMPLEX 30 MIN: CPT | Mod: GP

## 2017-01-01 PROCEDURE — 84157 ASSAY OF PROTEIN OTHER: CPT | Performed by: INTERNAL MEDICINE

## 2017-01-01 PROCEDURE — 70544 MR ANGIOGRAPHY HEAD W/O DYE: CPT

## 2017-01-01 PROCEDURE — 99310 SBSQ NF CARE HIGH MDM 45: CPT | Mod: GW | Performed by: NURSE PRACTITIONER

## 2017-01-01 PROCEDURE — 99285 EMERGENCY DEPT VISIT HI MDM: CPT | Mod: 25 | Performed by: EMERGENCY MEDICINE

## 2017-01-01 PROCEDURE — 85025 COMPLETE CBC W/AUTO DIFF WBC: CPT | Performed by: EMERGENCY MEDICINE

## 2017-01-01 PROCEDURE — 62270 DX LMBR SPI PNXR: CPT | Performed by: INTERNAL MEDICINE

## 2017-01-01 PROCEDURE — A9585 GADOBUTROL INJECTION: HCPCS | Performed by: EMERGENCY MEDICINE

## 2017-01-01 PROCEDURE — 00000146 ZZHCL STATISTIC GLUCOSE BY METER IP

## 2017-01-01 PROCEDURE — 92610 EVALUATE SWALLOWING FUNCTION: CPT | Mod: GN

## 2017-01-01 PROCEDURE — 36415 COLL VENOUS BLD VENIPUNCTURE: CPT | Performed by: INTERNAL MEDICINE

## 2017-01-01 PROCEDURE — 87529 HSV DNA AMP PROBE: CPT | Performed by: INTERNAL MEDICINE

## 2017-01-01 PROCEDURE — 99232 SBSQ HOSP IP/OBS MODERATE 35: CPT | Mod: GC | Performed by: INTERNAL MEDICINE

## 2017-01-01 PROCEDURE — 96375 TX/PRO/DX INJ NEW DRUG ADDON: CPT

## 2017-01-01 PROCEDURE — 99309 SBSQ NF CARE MODERATE MDM 30: CPT | Performed by: NURSE PRACTITIONER

## 2017-01-01 PROCEDURE — 87389 HIV-1 AG W/HIV-1&-2 AB AG IA: CPT | Performed by: INTERNAL MEDICINE

## 2017-01-01 PROCEDURE — 99207 ZZC CDG-CODE CATEGORY CHANGED: CPT | Performed by: NURSE PRACTITIONER

## 2017-01-01 PROCEDURE — 009U3ZX DRAINAGE OF SPINAL CANAL, PERCUTANEOUS APPROACH, DIAGNOSTIC: ICD-10-PCS | Performed by: INTERNAL MEDICINE

## 2017-01-01 RX ORDER — HEPARIN SODIUM,PORCINE 10 UNIT/ML
5-10 VIAL (ML) INTRAVENOUS
Status: DISCONTINUED | OUTPATIENT
Start: 2017-01-01 | End: 2017-01-01 | Stop reason: HOSPADM

## 2017-01-01 RX ORDER — SULFAMETHOXAZOLE/TRIMETHOPRIM 800-160 MG
1 TABLET ORAL 2 TIMES DAILY
Status: DISCONTINUED | OUTPATIENT
Start: 2017-01-01 | End: 2017-01-01 | Stop reason: HOSPADM

## 2017-01-01 RX ORDER — GADOBUTROL 604.72 MG/ML
7.5 INJECTION INTRAVENOUS ONCE
Status: COMPLETED | OUTPATIENT
Start: 2017-01-01 | End: 2017-01-01

## 2017-01-01 RX ORDER — LEVETIRACETAM 500 MG/1
500 TABLET ORAL 2 TIMES DAILY
Qty: 60 TABLET | Refills: 1 | Status: ON HOLD | OUTPATIENT
Start: 2017-01-01 | End: 2017-01-01

## 2017-01-01 RX ORDER — SULFAMETHOXAZOLE/TRIMETHOPRIM 800-160 MG
1 TABLET ORAL 2 TIMES DAILY
COMMUNITY
Start: 2017-01-01 | End: 2017-01-01

## 2017-01-01 RX ORDER — LEVETIRACETAM 500 MG/1
500 TABLET ORAL 2 TIMES DAILY
Status: DISCONTINUED | OUTPATIENT
Start: 2017-01-01 | End: 2017-01-01 | Stop reason: HOSPADM

## 2017-01-01 RX ORDER — GABAPENTIN 100 MG/1
100 CAPSULE ORAL
Qty: 90 CAPSULE | Refills: 3 | DISCHARGE
Start: 2017-01-01 | End: 2017-01-01

## 2017-01-01 RX ORDER — DEXTROSE MONOHYDRATE, SODIUM CHLORIDE, AND POTASSIUM CHLORIDE 50; 1.49; 4.5 G/1000ML; G/1000ML; G/1000ML
INJECTION, SOLUTION INTRAVENOUS ONCE
Status: COMPLETED | OUTPATIENT
Start: 2017-01-01 | End: 2017-01-01

## 2017-01-01 RX ORDER — FENTANYL CITRATE 50 UG/ML
25-50 INJECTION, SOLUTION INTRAMUSCULAR; INTRAVENOUS
Status: COMPLETED | OUTPATIENT
Start: 2017-01-01 | End: 2017-01-01

## 2017-01-01 RX ORDER — FENTANYL CITRATE 50 UG/ML
25 INJECTION, SOLUTION INTRAMUSCULAR; INTRAVENOUS EVERY 5 MIN PRN
Status: ACTIVE | OUTPATIENT
Start: 2017-01-01 | End: 2017-01-01

## 2017-01-01 RX ORDER — HEPARIN SODIUM (PORCINE) LOCK FLUSH IV SOLN 100 UNIT/ML 100 UNIT/ML
5 SOLUTION INTRAVENOUS EVERY 8 HOURS
Status: DISCONTINUED | OUTPATIENT
Start: 2017-01-01 | End: 2017-01-01 | Stop reason: HOSPADM

## 2017-01-01 RX ORDER — SULFAMETHOXAZOLE/TRIMETHOPRIM 800-160 MG
1 TABLET ORAL 2 TIMES DAILY
Refills: 0 | DISCHARGE
Start: 2017-01-01 | End: 2017-01-01 | Stop reason: DRUGHIGH

## 2017-01-01 RX ORDER — ONDANSETRON 8 MG/1
8 TABLET, FILM COATED ORAL EVERY 8 HOURS PRN
Qty: 10 TABLET | Refills: 2 | DISCHARGE
Start: 2017-01-01 | End: 2017-05-22

## 2017-01-01 RX ORDER — GADOBUTROL 604.72 MG/ML
0.1 INJECTION INTRAVENOUS ONCE
Status: COMPLETED | OUTPATIENT
Start: 2017-01-01 | End: 2017-01-01

## 2017-01-01 RX ORDER — POTASSIUM CHLORIDE 750 MG/1
20-40 TABLET, EXTENDED RELEASE ORAL
Status: DISCONTINUED | OUTPATIENT
Start: 2017-01-01 | End: 2017-01-01 | Stop reason: HOSPADM

## 2017-01-01 RX ORDER — ACETAMINOPHEN 325 MG/1
325 TABLET ORAL EVERY 4 HOURS PRN
Status: DISCONTINUED | OUTPATIENT
Start: 2017-01-01 | End: 2017-01-01 | Stop reason: HOSPADM

## 2017-01-01 RX ORDER — GABAPENTIN 100 MG/1
100 CAPSULE ORAL
Status: DISCONTINUED | OUTPATIENT
Start: 2017-01-01 | End: 2017-01-01 | Stop reason: HOSPADM

## 2017-01-01 RX ORDER — ONDANSETRON 2 MG/ML
4 INJECTION INTRAMUSCULAR; INTRAVENOUS EVERY 6 HOURS PRN
Status: DISCONTINUED | OUTPATIENT
Start: 2017-01-01 | End: 2017-01-01 | Stop reason: HOSPADM

## 2017-01-01 RX ORDER — FAMOTIDINE 20 MG
2000 TABLET ORAL DAILY
DISCHARGE
Start: 2017-01-01 | End: 2017-01-01

## 2017-01-01 RX ORDER — HEPARIN SODIUM,PORCINE 10 UNIT/ML
5-10 VIAL (ML) INTRAVENOUS EVERY 24 HOURS
Status: DISCONTINUED | OUTPATIENT
Start: 2017-01-01 | End: 2017-01-01 | Stop reason: HOSPADM

## 2017-01-01 RX ORDER — CALCIUM CARBONATE 500(1250)
500 TABLET ORAL DAILY
Qty: 90 TABLET | DISCHARGE
Start: 2017-01-01 | End: 2017-01-01

## 2017-01-01 RX ORDER — NALOXONE HYDROCHLORIDE 0.4 MG/ML
.1-.4 INJECTION, SOLUTION INTRAMUSCULAR; INTRAVENOUS; SUBCUTANEOUS
Status: ACTIVE | OUTPATIENT
Start: 2017-01-01 | End: 2017-01-01

## 2017-01-01 RX ORDER — MORPHINE SULFATE 20 MG/5ML
2.5 SOLUTION ORAL EVERY 8 HOURS
COMMUNITY

## 2017-01-01 RX ORDER — LEVETIRACETAM 100 MG/ML
500 SOLUTION ORAL 2 TIMES DAILY
COMMUNITY

## 2017-01-01 RX ORDER — ACETAMINOPHEN 325 MG/1
650 TABLET ORAL EVERY 6 HOURS PRN
Qty: 100 TABLET | DISCHARGE
Start: 2017-01-01

## 2017-01-01 RX ORDER — LEVETIRACETAM 500 MG/1
500 TABLET ORAL 2 TIMES DAILY
Qty: 60 TABLET | Refills: 1 | DISCHARGE
Start: 2017-01-01 | End: 2017-01-01 | Stop reason: DRUGHIGH

## 2017-01-01 RX ORDER — EPINEPHRINE 0.3 MG/.3ML
INJECTION SUBCUTANEOUS
Status: DISCONTINUED
Start: 2017-01-01 | End: 2017-01-01 | Stop reason: WASHOUT

## 2017-01-01 RX ORDER — MORPHINE SULFATE 100 MG/5ML
2.5 SOLUTION ORAL
COMMUNITY

## 2017-01-01 RX ORDER — HEPARIN SODIUM (PORCINE) LOCK FLUSH IV SOLN 100 UNIT/ML 100 UNIT/ML
5 SOLUTION INTRAVENOUS
Status: DISCONTINUED | OUTPATIENT
Start: 2017-01-01 | End: 2017-01-01 | Stop reason: HOSPADM

## 2017-01-01 RX ORDER — POTASSIUM CHLORIDE 29.8 MG/ML
20 INJECTION INTRAVENOUS
Status: DISCONTINUED | OUTPATIENT
Start: 2017-01-01 | End: 2017-01-01 | Stop reason: HOSPADM

## 2017-01-01 RX ORDER — DIAZEPAM 10 MG/2ML
2.5 INJECTION, SOLUTION INTRAMUSCULAR; INTRAVENOUS ONCE
Status: COMPLETED | OUTPATIENT
Start: 2017-01-01 | End: 2017-01-01

## 2017-01-01 RX ORDER — HEPARIN SODIUM (PORCINE) LOCK FLUSH IV SOLN 100 UNIT/ML 100 UNIT/ML
5 SOLUTION INTRAVENOUS ONCE
Status: COMPLETED | OUTPATIENT
Start: 2017-01-01 | End: 2017-01-01

## 2017-01-01 RX ORDER — GABAPENTIN 100 MG/1
100 CAPSULE ORAL
Qty: 90 CAPSULE | Refills: 3 | Status: ON HOLD | OUTPATIENT
Start: 2017-01-01 | End: 2017-01-01

## 2017-01-01 RX ORDER — HEPARIN SODIUM 5000 [USP'U]/.5ML
5000 INJECTION, SOLUTION INTRAVENOUS; SUBCUTANEOUS EVERY 8 HOURS
Status: DISCONTINUED | OUTPATIENT
Start: 2017-01-01 | End: 2017-01-01

## 2017-01-01 RX ORDER — LIDOCAINE 40 MG/G
CREAM TOPICAL
Status: DISCONTINUED | OUTPATIENT
Start: 2017-01-01 | End: 2017-01-01 | Stop reason: HOSPADM

## 2017-01-01 RX ORDER — POTASSIUM CHLORIDE 1.5 G/1.58G
20-40 POWDER, FOR SOLUTION ORAL
Status: DISCONTINUED | OUTPATIENT
Start: 2017-01-01 | End: 2017-01-01 | Stop reason: HOSPADM

## 2017-01-01 RX ORDER — MULTIVIT WITH MINERALS/LUTEIN
500 TABLET ORAL DAILY
Qty: 30 TABLET | DISCHARGE
Start: 2017-01-01 | End: 2017-01-01

## 2017-01-01 RX ORDER — DOCUSATE SODIUM 100 MG/1
100 CAPSULE, LIQUID FILLED ORAL 2 TIMES DAILY PRN
Status: DISCONTINUED | OUTPATIENT
Start: 2017-01-01 | End: 2017-01-01 | Stop reason: HOSPADM

## 2017-01-01 RX ORDER — POTASSIUM CHLORIDE 7.45 MG/ML
10 INJECTION INTRAVENOUS
Status: DISCONTINUED | OUTPATIENT
Start: 2017-01-01 | End: 2017-01-01 | Stop reason: HOSPADM

## 2017-01-01 RX ORDER — PROCHLORPERAZINE MALEATE 10 MG
5 TABLET ORAL EVERY 6 HOURS PRN
Qty: 20 TABLET | Refills: 1 | DISCHARGE
Start: 2017-01-01

## 2017-01-01 RX ORDER — HEPARIN SODIUM (PORCINE) LOCK FLUSH IV SOLN 100 UNIT/ML 100 UNIT/ML
500 SOLUTION INTRAVENOUS ONCE
Status: COMPLETED | OUTPATIENT
Start: 2017-01-01 | End: 2017-01-01

## 2017-01-01 RX ORDER — GABAPENTIN 100 MG/1
100 CAPSULE ORAL AT BEDTIME
Qty: 90 CAPSULE | Refills: 0 | Status: SHIPPED | OUTPATIENT
Start: 2017-01-01 | End: 2017-01-01

## 2017-01-01 RX ORDER — FLUMAZENIL 0.1 MG/ML
0.2 INJECTION, SOLUTION INTRAVENOUS
Status: ACTIVE | OUTPATIENT
Start: 2017-01-01 | End: 2017-01-01

## 2017-01-01 RX ADMIN — SODIUM CHLORIDE, PRESERVATIVE FREE 5 ML: 5 INJECTION INTRAVENOUS at 11:04

## 2017-01-01 RX ADMIN — LEVETIRACETAM 500 MG: 500 TABLET ORAL at 08:57

## 2017-01-01 RX ADMIN — SODIUM CHLORIDE, PRESERVATIVE FREE 5 ML: 5 INJECTION INTRAVENOUS at 17:19

## 2017-01-01 RX ADMIN — LEVETIRACETAM 500 MG: 100 INJECTION, SOLUTION INTRAVENOUS at 00:40

## 2017-01-01 RX ADMIN — DENOSUMAB 120 MG: 120 INJECTION SUBCUTANEOUS at 14:57

## 2017-01-01 RX ADMIN — POTASSIUM CHLORIDE 20 MEQ: 29.8 INJECTION, SOLUTION INTRAVENOUS at 22:07

## 2017-01-01 RX ADMIN — SODIUM CHLORIDE, PRESERVATIVE FREE 5 ML: 5 INJECTION INTRAVENOUS at 11:10

## 2017-01-01 RX ADMIN — GEMCITABINE 2000 MG: 38 INJECTION, SOLUTION INTRAVENOUS at 16:15

## 2017-01-01 RX ADMIN — SODIUM CHLORIDE, PRESERVATIVE FREE 5 ML: 5 INJECTION INTRAVENOUS at 13:55

## 2017-01-01 RX ADMIN — GABAPENTIN 100 MG: 100 CAPSULE ORAL at 17:00

## 2017-01-01 RX ADMIN — LEVETIRACETAM 500 MG: 500 TABLET ORAL at 19:51

## 2017-01-01 RX ADMIN — SULFAMETHOXAZOLE AND TRIMETHOPRIM 1 TABLET: 800; 160 TABLET ORAL at 11:28

## 2017-01-01 RX ADMIN — GADOBUTROL 4 ML: 604.72 INJECTION INTRAVENOUS at 18:48

## 2017-01-01 RX ADMIN — DENOSUMAB 120 MG: 120 INJECTION SUBCUTANEOUS at 16:51

## 2017-01-01 RX ADMIN — GADOBUTROL 7.5 ML: 604.72 INJECTION INTRAVENOUS at 19:30

## 2017-01-01 RX ADMIN — DEXAMETHASONE SODIUM PHOSPHATE: 10 INJECTION, SOLUTION INTRAMUSCULAR; INTRAVENOUS at 12:44

## 2017-01-01 RX ADMIN — SULFAMETHOXAZOLE AND TRIMETHOPRIM 1 TABLET: 800; 160 TABLET ORAL at 08:24

## 2017-01-01 RX ADMIN — LEVETIRACETAM 500 MG: 100 INJECTION, SOLUTION INTRAVENOUS at 23:59

## 2017-01-01 RX ADMIN — LEVETIRACETAM 500 MG: 500 TABLET ORAL at 11:29

## 2017-01-01 RX ADMIN — GABAPENTIN 100 MG: 100 CAPSULE ORAL at 09:10

## 2017-01-01 RX ADMIN — LEVETIRACETAM 500 MG: 100 INJECTION, SOLUTION INTRAVENOUS at 11:03

## 2017-01-01 RX ADMIN — HEPARIN SODIUM 5000 UNITS: 10000 INJECTION, SOLUTION INTRAVENOUS; SUBCUTANEOUS at 06:49

## 2017-01-01 RX ADMIN — DEXTROSE AND SODIUM CHLORIDE: 5; 900 INJECTION, SOLUTION INTRAVENOUS at 09:19

## 2017-01-01 RX ADMIN — GEMCITABINE 2000 MG: 38 INJECTION, SOLUTION INTRAVENOUS at 13:43

## 2017-01-01 RX ADMIN — SODIUM CHLORIDE 500 ML: 9 INJECTION, SOLUTION INTRAVENOUS at 17:25

## 2017-01-01 RX ADMIN — FENTANYL CITRATE 25 MCG: 50 INJECTION, SOLUTION INTRAMUSCULAR; INTRAVENOUS at 19:15

## 2017-01-01 RX ADMIN — SODIUM CHLORIDE, PRESERVATIVE FREE 5 ML: 5 INJECTION INTRAVENOUS at 16:03

## 2017-01-01 RX ADMIN — TRASTUZUMAB 180 MG: KIT at 15:25

## 2017-01-01 RX ADMIN — SODIUM CHLORIDE, PRESERVATIVE FREE 5 ML: 5 INJECTION INTRAVENOUS at 11:24

## 2017-01-01 RX ADMIN — FENTANYL CITRATE 25 MCG: 50 INJECTION, SOLUTION INTRAMUSCULAR; INTRAVENOUS at 20:15

## 2017-01-01 RX ADMIN — CARBOPLATIN 300 MG: 10 INJECTION, SOLUTION INTRAVENOUS at 13:25

## 2017-01-01 RX ADMIN — GEMCITABINE 2000 MG: 38 INJECTION, SOLUTION INTRAVENOUS at 12:52

## 2017-01-01 RX ADMIN — MIDAZOLAM 0.5 MG: 1 INJECTION INTRAMUSCULAR; INTRAVENOUS at 19:50

## 2017-01-01 RX ADMIN — TRASTUZUMAB 180 MG: KIT at 12:19

## 2017-01-01 RX ADMIN — GABAPENTIN 100 MG: 100 CAPSULE ORAL at 07:44

## 2017-01-01 RX ADMIN — SODIUM CHLORIDE, PRESERVATIVE FREE 5 ML: 5 INJECTION INTRAVENOUS at 10:31

## 2017-01-01 RX ADMIN — MIDAZOLAM 1 MG: 1 INJECTION INTRAMUSCULAR; INTRAVENOUS at 19:11

## 2017-01-01 RX ADMIN — SODIUM CHLORIDE 250 ML: 9 INJECTION, SOLUTION INTRAVENOUS at 12:04

## 2017-01-01 RX ADMIN — GABAPENTIN 100 MG: 100 CAPSULE ORAL at 16:11

## 2017-01-01 RX ADMIN — SODIUM CHLORIDE, PRESERVATIVE FREE 5 ML: 5 INJECTION INTRAVENOUS at 11:37

## 2017-01-01 RX ADMIN — CARBOPLATIN 270 MG: 10 INJECTION, SOLUTION INTRAVENOUS at 16:48

## 2017-01-01 RX ADMIN — SODIUM CHLORIDE 1000 ML: 9 INJECTION, SOLUTION INTRAVENOUS at 22:10

## 2017-01-01 RX ADMIN — POTASSIUM CHLORIDE, DEXTROSE MONOHYDRATE AND SODIUM CHLORIDE: 150; 5; 450 INJECTION, SOLUTION INTRAVENOUS at 03:56

## 2017-01-01 RX ADMIN — MIDAZOLAM 0.5 MG: 1 INJECTION INTRAMUSCULAR; INTRAVENOUS at 19:21

## 2017-01-01 RX ADMIN — FENTANYL CITRATE 25 MCG: 50 INJECTION, SOLUTION INTRAMUSCULAR; INTRAVENOUS at 19:46

## 2017-01-01 RX ADMIN — LEVETIRACETAM 500 MG: 100 INJECTION, SOLUTION INTRAVENOUS at 00:03

## 2017-01-01 RX ADMIN — SULFAMETHOXAZOLE AND TRIMETHOPRIM 1 TABLET: 800; 160 TABLET ORAL at 19:36

## 2017-01-01 RX ADMIN — CARBOPLATIN 285 MG: 10 INJECTION, SOLUTION INTRAVENOUS at 14:27

## 2017-01-01 RX ADMIN — DIAZEPAM 2.5 MG: 5 INJECTION, SOLUTION INTRAMUSCULAR; INTRAVENOUS at 19:00

## 2017-01-01 RX ADMIN — SODIUM CHLORIDE, PRESERVATIVE FREE 5 ML: 5 INJECTION INTRAVENOUS at 10:32

## 2017-01-01 RX ADMIN — FENTANYL CITRATE 25 MCG: 50 INJECTION, SOLUTION INTRAMUSCULAR; INTRAVENOUS at 19:00

## 2017-01-01 RX ADMIN — DEXAMETHASONE SODIUM PHOSPHATE: 10 INJECTION, SOLUTION INTRAMUSCULAR; INTRAVENOUS at 15:04

## 2017-01-01 RX ADMIN — LEVETIRACETAM 500 MG: 500 TABLET ORAL at 08:24

## 2017-01-01 RX ADMIN — SULFAMETHOXAZOLE AND TRIMETHOPRIM 1 TABLET: 800; 160 TABLET ORAL at 09:10

## 2017-01-01 RX ADMIN — SULFAMETHOXAZOLE AND TRIMETHOPRIM 1 TABLET: 800; 160 TABLET ORAL at 08:58

## 2017-01-01 RX ADMIN — TRASTUZUMAB 180 MG: KIT at 13:10

## 2017-01-01 RX ADMIN — SULFAMETHOXAZOLE AND TRIMETHOPRIM 1 TABLET: 800; 160 TABLET ORAL at 19:51

## 2017-01-01 RX ADMIN — SODIUM CHLORIDE, PRESERVATIVE FREE 5 ML: 5 INJECTION INTRAVENOUS at 15:01

## 2017-01-01 RX ADMIN — MIDAZOLAM 0.5 MG: 1 INJECTION INTRAMUSCULAR; INTRAVENOUS at 20:29

## 2017-01-01 RX ADMIN — LEVETIRACETAM 500 MG: 500 TABLET ORAL at 19:52

## 2017-01-01 RX ADMIN — LEVETIRACETAM 500 MG: 500 TABLET ORAL at 09:10

## 2017-01-01 RX ADMIN — MIDAZOLAM 0.5 MG: 1 INJECTION INTRAMUSCULAR; INTRAVENOUS at 20:13

## 2017-01-01 RX ADMIN — SODIUM CHLORIDE, PRESERVATIVE FREE 5 ML: 5 INJECTION INTRAVENOUS at 09:42

## 2017-01-01 RX ADMIN — DEXAMETHASONE SODIUM PHOSPHATE: 10 INJECTION, SOLUTION INTRAMUSCULAR; INTRAVENOUS at 12:04

## 2017-01-01 RX ADMIN — POTASSIUM CHLORIDE 20 MEQ: 29.8 INJECTION, SOLUTION INTRAVENOUS at 17:31

## 2017-01-01 RX ADMIN — LEVETIRACETAM 500 MG: 500 TABLET ORAL at 19:36

## 2017-01-01 RX ADMIN — SODIUM CHLORIDE 250 ML: 9 INJECTION, SOLUTION INTRAVENOUS at 15:03

## 2017-01-01 RX ADMIN — DEXTROSE AND SODIUM CHLORIDE: 5; 900 INJECTION, SOLUTION INTRAVENOUS at 18:39

## 2017-01-01 RX ADMIN — SULFAMETHOXAZOLE AND TRIMETHOPRIM 1 TABLET: 800; 160 TABLET ORAL at 19:52

## 2017-01-01 RX ADMIN — SODIUM CHLORIDE, PRESERVATIVE FREE 5 ML: 5 INJECTION INTRAVENOUS at 11:21

## 2017-01-01 RX ADMIN — GABAPENTIN 100 MG: 100 CAPSULE ORAL at 08:24

## 2017-01-01 RX ADMIN — LEVETIRACETAM 500 MG: 100 INJECTION, SOLUTION INTRAVENOUS at 10:55

## 2017-01-01 RX ADMIN — SODIUM CHLORIDE, PRESERVATIVE FREE 500 UNITS: 5 INJECTION INTRAVENOUS at 13:09

## 2017-01-01 RX ADMIN — SODIUM CHLORIDE 250 ML: 9 INJECTION, SOLUTION INTRAVENOUS at 12:44

## 2017-01-01 RX ADMIN — GABAPENTIN 100 MG: 100 CAPSULE ORAL at 08:56

## 2017-01-01 RX ADMIN — GABAPENTIN 100 MG: 100 CAPSULE ORAL at 15:53

## 2017-01-01 ASSESSMENT — PAIN SCALES - GENERAL
PAINLEVEL: NO PAIN (0)

## 2017-01-01 ASSESSMENT — PAIN DESCRIPTION - DESCRIPTORS: DESCRIPTORS: ACHING

## 2017-01-12 NOTE — PROGRESS NOTES
Infusion Nursing Note:  Venus Hyatt presents today for C4 D1 Carboplatin, Gemzar, Herceptin and Xgeva.    Patient seen by provider today: Yes: Harriet MAYES    Note: N/A.    Intravenous Access:  Implanted Port.    Treatment Conditions:  HGB     10.9   1/12/2017  WBC      3.1   1/12/2017   ANEU      2.3   1/12/2017  PLT      115   1/12/2017     NA      144   1/12/2017                POTASSIUM      3.6   1/12/2017        MAG      2.4   9/28/2016         CR     0.37   1/12/2017                CARLIN      8.7   1/12/2017             BILITOTAL      1.2   1/12/2017        ALBUMIN      3.0   1/12/2017                 ALT       25   1/12/2017        AST       20   1/12/2017  Results reviewed, labs MET treatment parameters, ok to proceed with treatment.      Post Infusion Assessment: Carboplatin stop time 1500 and Gemzar stop time 1420  Patient tolerated infusion without incident.  Patient tolerated injection without incident.  Blood return noted pre and post infusion.  Site patent and intact, free from redness, edema or discomfort.  No evidence of extravasations.  Access discontinued per protocol.    Discharge Plan:   Patient declined prescription refills.  Discharge instructions reviewed with: Patient.  Patient and/or family verbalized understanding of discharge instructions and all questions answered.  Patient discharged in stable condition accompanied by: .  Departure Mode: Wheelchair.    Lianna Flaherty RN

## 2017-01-12 NOTE — Clinical Note
1/12/2017     RE: Venus Hyatt  [id number]  [place] [id number]     Dear Colleague,    Thank you for referring your patient, Venus Hyatt, to the [facility]. Please see a copy of my visit note below.    Hematology-Oncology Visit  Jan 12, 2017    Reason for Visit: Metastatic inflammatory breast cancer follow up. Currently treated with Herceptin, gemcitabine, and carboplatin    HPI: Ms. Hyatt is a 76-year-old woman who presented in 11/2013 with a new diagnosis of metastatic inflammatory breast cancer of the right breast, ER negative, [place] negative and HER-2 amplified with replacement of the right breast and multiple metastases to bone, liver, lungs, axillary and mediastinal lymph nodes and extensive rash across the posterior trunk to the spinal area She was started on the WILFREDO regimen consisting of pertuzumab, trastuzumab and taxane. Taxane was chosen to be weekly paclitaxel 80 mg/m2. She began this regimen 11/2013. She had a marked improvement of her metastatic disease as well as marked improvement of the mass and skin changes in the right breast itself. Her CA 27.29 and CEA markers both normalized. She was found to have progression and her treatment was changed to Kadcyla or T-DM1 5/2014. Kadcyla was dose-reduced for cycle 10 given progressive neuropathy with functional impairment.     She had sudden onset of left side weakness and aphasia in 9/2015 and brain MRI revealed multiple brain metastasis with largest lesion in right frontal lobe with extensive vasogenic edema causing mass effect on right lateral ventricle and midline shift. 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 She had emergent right frontal mass resection 9/25/2015. She then underwent WBRT and as well as right breast radiation. She started on Xeloda and Herceptin 1/2016. Xeloda dose was reduced slightly due to anorexia. She had gamma Knife to 2 brain lesions. She had progression of cerebral metastases so  treatment was changed to Xeloda and lapatinib.     She had progression in both brain and right breast and right lateral chest wall so treatment was changed to Herceptin and [place]aven which started . She then had seizure activity in September so further doses were held. She had gamma knife to 13 lesions on 16. She continued on Herceptin and added in metronomic cytoxan and methotrexate on 10/17/16. She had progression and treatment was changed to gemcitabine, carboplatin, and Herceptin every 2 weeks.     Interval Hx: Ms. Hyatt presents to clinic with her  today. She is here prior to cycle 4 of carbo/gem/Herceptin. Overall things are stable. She does not have many side effects from treatment. Has some mild constipation that is controlled with prn Senna. She denies any nausea. Her appetite waxes and wanes and she has lost a pound. She enjoys chocolate milk, ice cream, and potato chips. She is eating about 2 small meals per day and some snacks. Drinking fluids but could be drinking more. She denies feeling weak, dizzy, or lightheaded. She continues to make small gains in PT and Colt notes this has been integral to her helping him with transfers. He notes she have waxing and waning memory at time with forgetting when someone has  or a particular event. Her memory and speech otherwise seem pretty stable. She notes neuropathy has been more uncomfortable. There has been no acute changes recently.     ROS: See interval hx. Denies fevers, chills, HA, changes in vision, hearing, seizures, congestion, cough, sore throat, CP, SOB, abdominal pain, N/V, diarrhea, changes in urination, bleeding, bruising, rash, dizziness, neuropathy, or change in mood.      Medications:  Current Outpatient Prescriptions   Medication Sig Dispense Refill     levETIRAcetam (KEPPRA) 500 MG tablet Take 1 tablet (500 mg) by mouth 2 times daily 60 tablet 0     amLODIPine (NORVASC) 5 MG tablet Take 1 tablet (5 mg) by mouth 2 times daily  (Patient taking differently: Take 5 mg by mouth daily ) 180 tablet 1     order for DME Cranial prosthesis 1 Units 1     diazepam (VALIUM) 5 MG tablet Take one tablet 30 minutes prior to MRI. May repeat times one 2 tablet 0     DOCUSATE SODIUM PO Take 100 mg by mouth 2 times daily as needed for constipation       heparin Lock Flush 10 UNIT/ML SOLN 5 mLs by Intracatheter route every 24 hours       acetaminophen 650 MG TABS 650 mg by Oral or Feeding Tube route every 6 hours as needed for mild pain 100 tablet      calcium carbonate (OS-CARLIN 500 MG [place]. [place]) 500 MG tablet Take 500 mg by mouth daily       Multiple Vitamins-Minerals (CENTRUM PO) Take 1 tablet by mouth daily       Ascorbic Acid (VITAMIN C PO) Take 500 mg by mouth daily        VITAMIN D, CHOLECALCIFEROL, PO Take 2,000 Units by mouth daily        LORazepam (ATIVAN) 0.5 MG tablet Take 1 tablet (0.5 mg) by mouth every 4 hours as needed (Anxiety, Nausea/Vomiting or Sleep) 30 tablet 2     ondansetron (ZOFRAN) 8 MG tablet Take 1 tablet (8 mg) by mouth every 8 hours as needed (Nausea/Vomiting) 10 tablet 2     prochlorperazine (COMPAZINE) 10 MG tablet Take 0.5 tablets (5 mg) by mouth every 6 hours as needed for nausea or vomiting 20 tablet 1         Allergies, PMHx, PSx, and Social Hx reviewed per EPIC.      Physical Exam:  /61 mmHg  Pulse 83  Temp(Src) 96.9  F (36.1  C)  Resp 16  Wt 43.137 kg (95 lb 1.6 oz)  SpO2 98%   BP recheck 129/78 Pulse 88  Wt Readings from Last 10 Encounters:   01/12/17 43.137 kg (95 lb 1.6 oz)   12/29/16 43.908 kg (96 lb 12.8 oz)   12/15/16 43.545 kg (96 lb)   12/01/16 43.999 kg (97 lb)   11/10/16 44.407 kg (97 lb 14.4 oz)   10/27/16 45.587 kg (100 lb 8 oz)   10/20/16 45.677 kg (100 lb 11.2 oz)   10/11/16 45.405 kg (100 lb 1.6 oz)   09/29/16 43.545 kg (96 lb)   09/28/16 43.545 kg (96 lb)     General: AAOx3, pleasant, no acute distress. Examined in wheelchair. Cachetic and frail appearing   HEENT: Partial alopecia. PERRL, EOMI,  oropharynx moist and pink, without lesions or thrush  Neck: No cervical or supraclavicular adenopathy  Breast: Right breast mass at 9 o'clock position is measuring at 2.5 x 2 cm. Right chest wall mass measures about 4 cm.   Heart: RRR, S1 and S2, no murmurs, clicks, or rubs  Lungs: CTA bilaterally, no wheezes, crackles, rhonchi, no use of accessory muscles  Abdomen: BS present, soft, non-tender, non-distended  Neuro: Left hand is spastic and not mobile. She is able to move both legs though L is weaker. Speech is soft and at times incoherent but is understandable when she speaks more slowly and a little louder   Extremities: no lower extremity edema    Labs:    1/12/2017 11:00   Sodium 144   Potassium 3.6   Chloride 109   Carbon Dioxide 28   Urea Nitrogen 15   Creatinine 0.37 (L)   GFR Estimate >90...   GFR Estimate If Black >90...   Calcium 8.7   Anion Gap 7   Albumin 3.0 (L)   Protein Total 6.0 (L)   Bilirubin Total 1.2   Alkaline Phosphatase 64   ALT 25   AST 20   CA 27-29 60 (H)   Glucose 130 (H)   WBC 3.1 (L)   Hemoglobin 10.9 (L)   Hematocrit 31.9 (L)   Platelet Count 115 (L)   RBC Count 3.41 (L)   MCV 94   MCH 32.0   MCHC 34.2   RDW 16.9 (H)   Diff Method Automated Method   % Neutrophils 74.0   % Lymphocytes 10.3   % Monocytes 13.2   % Eosinophils 1.3   % Basophils 0.6   % Immature Granulocytes 0.6   Nucleated RBCs 0   Absolute Neutrophil 2.3   Absolute Lymphocytes 0.3 (L)   Absolute Monocytes 0.4   Absolute Eosinophils 0.0   Absolute Basophils 0.0   Abs Immature Granulocytes 0.0   Absolute Nucleated RBC 0.0   CEA 5.5 (H)      11/8/2016 08:42 12/1/2016 08:14 12/15/2016 11:11 12/29/2016 09:11 1/12/2017 11:00   CA 27-29 56 (H) 50 (H) 43 (H) 52 (H) 60 (H)   CEA 4.0 (H) 5.5 (H) 5.0 (H) 5.3 (H) 5.5 (H)         Assessment/Plan:  1. Metastatic inflammatory breast cancer: ER negative, [place] negative and HER-2 amplified with replacement of the right breast and multiple metastases to bone, liver, lungs, brain, nodes, and  skin. S/p multiple systemic therapies and currently on Herceptin, carboplatin, and gemcitabine every 2 weeks. Echocardiogram is up to date from November. She is tolerated treatment well other than mild constipation and maybe mild anorexia. Her counts are adequate to continue with cycle 4 today. Her tumor markers have trended up slightly though R breast mass is measuring smaller. Follow-up with Dr. Mares 1/26 with restaging 2/8 and then follow-up 2/9. They will call with any interval concerns.     2. Bone mets: Currently treated with xgeva. She is on vitamin D and calcium supplementation. Due today.     3. Brain mets: S/p surgical resection, WBRT, gamma knife to 2 lesions and then further gamma knife to 13. She has follow-up brain MRI on 2/27 and sees Dr. Liu the same day.     4. Weight loss: Weight currently at 95 pounds. Encouraged her to include more ice cream, chips, sweets, and supplement shakes.    5. Rehab: Strength and mobility are stable. Currently with PT 3x per week outpatient.     6. Neuropathy: Start gabapentin 100 mg q hs.     Harriet Bartlett PA-C    [facility]  [address] [place] [id number]  [phone]

## 2017-01-12 NOTE — NURSING NOTE
"Venus Hyatt is a 76 year old female who presents for:  Chief Complaint   Patient presents with     Port Draw     labs drawn from [facility]     Oncology Clinic Visit     Return: Breast Ca         Initial Vitals:  /61 mmHg  Pulse 83  Temp(Src) 96.9  F (36.1  C)  Resp 16  Wt 43.137 kg (95 lb 1.6 oz)  SpO2 98% Estimated body mass index is 17.11 kg/(m^2) as calculated from the following:    Height as of 12/15/16: 1.588 m (5' 2.5\").    Weight as of this encounter: 43.137 kg (95 lb 1.6 oz).. There is no height on file to calculate BSA. BP completed using cuff size: BP was taken in LAB INTAKE  No Pain (0) No LMP recorded. Patient is postmenopausal. Allergies and medications reviewed.     Medications: MEDICATION REFILLS NEEDED TODAY.  Pharmacy name entered into Keepstream:    [facility] [id number] [place] [address] [id number] [facility] [address] [facility] [place], [place] - [address] [place] [address] [phone]    Comments: Might need Keppra.  Patient received flu vaccine. See Immunizations.  Leesa Chamberlain CMA        6 minutes for nursing intake (face to face time)   Leesa Chamberlain CMA            "

## 2017-01-12 NOTE — NURSING NOTE
Chief Complaint   Patient presents with     Port Draw     labs drawn from portacath     Port accessed with power needle for labs and infusion  Heparin locked, vitals checked

## 2017-01-12 NOTE — PATIENT INSTRUCTIONS
January 2017 Sunday Monday Tuesday Wednesday Thursday Friday Saturday   1     2     3     4     5     6     7       8     9     10     11     12     [facility] D[facility]   10:30 AM   (15 min.)    [facility]     [facility] RETURN   11:00 AM   (50 min.)   Harriet Bartlett PA   [facility]     [facility] ONC INFUSION 180   12:30 PM   (180 min.)   [facility] INFUSION   [facility] 13     14       15     16     17     18     19     20     21       22     23     24     25     26     [facility] DRAW    9:30 AM   (15 min.)    [facility] DRAW   [facility] Lab Draw     [facility] RETURN   10:00 AM   (30 min.)   Ken Mares MD   [facility]     [facility] ONC INFUSION 180   10:30 AM   (180 min.)    ONCOLOGY INFUSION   [facility] 27     28       29 30 31 February 2017 Sunday Monday Tuesday Wednesday Thursday Friday Saturday                  1     2     3     4       5     6     7     8     CT CHEST/ABDOMEN/PELVIS W   10:40 AM   (20 min.)   CT2   [facility] CT 9     10     11       12     13     14     15     16     17     18       19     20     21     22     23     24     25       26     27     MR BRAIN WWO   12:15 PM   (45 min.)   MR1   [facility] MRI     [facility] RETURN    3:00 PM   (15 min.)   Bernardo Liu MD   [facility] 28                                      Lab Results:  Recent Results (from the past 12 hour(s))   Ca27.29  breast tumor marker    Collection Time: 01/12/17 11:00 AM   Result Value Ref Range    CA 27-29 60 (H) 0 - 39 U/mL   CBC with platelets differential    Collection Time: 01/12/17 11:00 AM   Result Value Ref Range    WBC 3.1 (L) 4.0 - 11.0 10e9/L    RBC Count 3.41 (L) 3.8 - 5.2 10e12/L    Hemoglobin 10.9 (L) 11.7 - 15.7 g/dL    Hematocrit 31.9 (L) 35.0 - 47.0 %    MCV 94 78 - 100 fl     MCH 32.0 26.5 - 33.0 pg    MCHC 34.2 31.5 - 36.5 g/dL    RDW 16.9 (H) 10.0 - 15.0 %    Platelet Count 115 (L) 150 - 450 10e9/L    Diff Method Automated Method     % Neutrophils 74.0 %    % Lymphocytes 10.3 %    % Monocytes 13.2 %    % Eosinophils 1.3 %    % Basophils 0.6 %    % Immature Granulocytes 0.6 %    Nucleated RBCs 0 0 /100    Absolute Neutrophil 2.3 1.6 - 8.3 10e9/L    Absolute Lymphocytes 0.3 (L) 0.8 - 5.3 10e9/L    Absolute Monocytes 0.4 0.0 - 1.3 10e9/L    Absolute Eosinophils 0.0 0.0 - 0.7 10e9/L    Absolute Basophils 0.0 0.0 - 0.2 10e9/L    Abs Immature Granulocytes 0.0 0 - 0.4 10e9/L    Absolute Nucleated RBC 0.0    CEA    Collection Time: 01/12/17 11:00 AM   Result Value Ref Range    CEA 5.5 (H) 0 - 2.5 ug/L   Comprehensive metabolic panel    Collection Time: 01/12/17 11:00 AM   Result Value Ref Range    Sodium 144 133 - 144 mmol/L    Potassium 3.6 3.4 - 5.3 mmol/L    Chloride 109 94 - 109 mmol/L    Carbon Dioxide 28 20 - 32 mmol/L    Anion Gap 7 3 - 14 mmol/L    Glucose 130 (H) 70 - 99 mg/dL    Urea Nitrogen 15 7 - 30 mg/dL    Creatinine 0.37 (L) 0.52 - 1.04 mg/dL    GFR Estimate >90  Non  GFR Calc   >60 mL/min/1.7m2    GFR Estimate If Black >90   GFR Calc   >60 mL/min/1.7m2    Calcium 8.7 8.5 - 10.1 mg/dL    Bilirubin Total 1.2 0.2 - 1.3 mg/dL    Albumin 3.0 (L) 3.4 - 5.0 g/dL    Protein Total 6.0 (L) 6.8 - 8.8 g/dL    Alkaline Phosphatase 64 40 - 150 U/L    ALT 25 0 - 50 U/L    AST 20 0 - 45 U/L

## 2017-01-12 NOTE — PROGRESS NOTES
Hematology-Oncology Visit  Jan 12, 2017    Reason for Visit: Metastatic inflammatory breast cancer follow up. Currently treated with Herceptin, gemcitabine, and carboplatin    HPI: Ms. 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 Taxane was chosen to be weekly paclitaxel 80 mg/m2. She began this regimen 11/2013. She had a marked improvement of her metastatic disease as well as marked improvement of the mass and skin changes in the right breast itself. Her CA 27.29 and CEA markers both normalized. She was found to have progression and her treatment was changed to Kadcyla or T-DM1 5/2014. Kadcyla was dose-reduced for cycle 10 given progressive neuropathy with functional impairment.     She had sudden onset of left side weakness and aphasia in 9/2015 and brain MRI revealed multiple brain metastasis with largest lesion in right frontal lobe with extensive vasogenic edema causing mass effect on right lateral ventricle and midline shift. 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 She had emergent right frontal mass resection 9/25/2015. She then underwent WBRT and as well as right breast radiation. She started on Xeloda and Herceptin 1/2016. Xeloda dose was reduced slightly due to anorexia. She had gamma Knife to 2 brain lesions. She had progression of cerebral metastases so treatment was changed to Xeloda and lapatinib.     She had progression in both brain and right breast and right lateral chest wall so treatment was changed to Herceptin and [place] which started 7/28. She then had seizure activity in September so  further doses were held. She had gamma knife to 13 lesions on 16. She continued on Herceptin and added in metronomic cytoxan and methotrexate on 10/17/16. She had progression and treatment was changed to gemcitabine, carboplatin, and Herceptin every 2 weeks.     Interval Hx: Ms. Hyatt presents to clinic with her  today. She is here prior to cycle 4 of carbo/gem/Herceptin. Overall things are stable. She does not have many side effects from treatment. Has some mild constipation that is controlled with prn Senna. She denies any nausea. Her appetite waxes and wanes and she has lost a pound. She enjoys chocolate milk, ice cream, and potato chips. She is eating about 2 small meals per day and some snacks. Drinking fluids but could be drinking more. She denies feeling weak, dizzy, or lightheaded. She continues to make small gains in PT and Colt notes this has been integral to her helping him with transfers. He notes she have waxing and waning memory at time with forgetting when someone has  or a particular event. Her memory and speech otherwise seem pretty stable. She notes neuropathy has been more uncomfortable. There has been no acute changes recently.     ROS: See interval hx. Denies fevers, chills, HA, changes in vision, hearing, seizures, congestion, cough, sore throat, CP, SOB, abdominal pain, N/V, diarrhea, changes in urination, bleeding, bruising, rash, dizziness, neuropathy, or change in mood.      Medications:  Current Outpatient Prescriptions   Medication Sig Dispense Refill     levETIRAcetam (KEPPRA) 500 MG tablet Take 1 tablet (500 mg) by mouth 2 times daily 60 tablet 0     amLODIPine (NORVASC) 5 MG tablet Take 1 tablet (5 mg) by mouth 2 times daily (Patient taking differently: Take 5 mg by mouth daily ) 180 tablet 1     order for DME Cranial prosthesis 1 Units 1     diazepam (VALIUM) 5 MG tablet Take one tablet 30 minutes prior to MRI. May repeat times one 2 tablet 0     DOCUSATE SODIUM PO Take  100 mg by mouth 2 times daily as needed for constipation       heparin Lock Flush 10 UNIT/ML SOLN 5 mLs by Intracatheter route every 24 hours       acetaminophen 650 MG TABS 650 mg by Oral or Feeding Tube route every 6 hours as needed for mild pain 100 tablet      calcium carbonate (OS-CARLIN 500 MG [place]. CA) 500 MG tablet Take 500 mg by mouth daily       Multiple Vitamins-Minerals (CENTRUM PO) Take 1 tablet by mouth daily       Ascorbic Acid (VITAMIN C PO) Take 500 mg by mouth daily        VITAMIN D, CHOLECALCIFEROL, PO Take 2,000 Units by mouth daily        LORazepam (ATIVAN) 0.5 MG tablet Take 1 tablet (0.5 mg) by mouth every 4 hours as needed (Anxiety, Nausea/Vomiting or Sleep) 30 tablet 2     ondansetron (ZOFRAN) 8 MG tablet Take 1 tablet (8 mg) by mouth every 8 hours as needed (Nausea/Vomiting) 10 tablet 2     prochlorperazine (COMPAZINE) 10 MG tablet Take 0.5 tablets (5 mg) by mouth every 6 hours as needed for nausea or vomiting 20 tablet 1         Allergies, PMHx, PSx, and Social Hx reviewed per EPIC.      Physical Exam:  /61 mmHg  Pulse 83  Temp(Src) 96.9  F (36.1  C)  Resp 16  Wt 43.137 kg (95 lb 1.6 oz)  SpO2 98%   BP recheck 129/78 Pulse 88  Wt Readings from Last 10 Encounters:   01/12/17 43.137 kg (95 lb 1.6 oz)   12/29/16 43.908 kg (96 lb 12.8 oz)   12/15/16 43.545 kg (96 lb)   12/01/16 43.999 kg (97 lb)   11/10/16 44.407 kg (97 lb 14.4 oz)   10/27/16 45.587 kg (100 lb 8 oz)   10/20/16 45.677 kg (100 lb 11.2 oz)   10/11/16 45.405 kg (100 lb 1.6 oz)   09/29/16 43.545 kg (96 lb)   09/28/16 43.545 kg (96 lb)     General: AAOx3, pleasant, no acute distress. Examined in wheelchair. Cachetic and frail appearing   HEENT: Partial alopecia. PERRL, EOMI, oropharynx moist and pink, without lesions or thrush  Neck: No cervical or supraclavicular adenopathy  Breast: Right breast mass at 9 o'clock position is measuring at 2.5 x 2 cm. Right chest wall mass measures about 4 cm.   Heart: RRR, S1 and S2, no  murmurs, clicks, or rubs  Lungs: CTA bilaterally, no wheezes, crackles, rhonchi, no use of accessory muscles  Abdomen: BS present, soft, non-tender, non-distended  Neuro: Left hand is spastic and not mobile. She is able to move both legs though L is weaker. Speech is soft and at times incoherent but is understandable when she speaks more slowly and a little louder   Extremities: no lower extremity edema    Labs:    1/12/2017 11:00   Sodium 144   Potassium 3.6   Chloride 109   Carbon Dioxide 28   Urea Nitrogen 15   Creatinine 0.37 (L)   GFR Estimate >90...   GFR Estimate If Black >90...   Calcium 8.7   Anion Gap 7   Albumin 3.0 (L)   Protein Total 6.0 (L)   Bilirubin Total 1.2   Alkaline Phosphatase 64   ALT 25   AST 20   CA 27-29 60 (H)   Glucose 130 (H)   WBC 3.1 (L)   Hemoglobin 10.9 (L)   Hematocrit 31.9 (L)   Platelet Count 115 (L)   RBC Count 3.41 (L)   MCV 94   MCH 32.0   MCHC 34.2   RDW 16.9 (H)   Diff Method Automated Method   % Neutrophils 74.0   % Lymphocytes 10.3   % Monocytes 13.2   % Eosinophils 1.3   % Basophils 0.6   % Immature Granulocytes 0.6   Nucleated RBCs 0   Absolute Neutrophil 2.3   Absolute Lymphocytes 0.3 (L)   Absolute Monocytes 0.4   Absolute Eosinophils 0.0   Absolute Basophils 0.0   Abs Immature Granulocytes 0.0   Absolute Nucleated RBC 0.0   CEA 5.5 (H)      11/8/2016 08:42 12/1/2016 08:14 12/15/2016 11:11 12/29/2016 09:11 1/12/2017 11:00   CA 27-29 56 (H) 50 (H) 43 (H) 52 (H) 60 (H)   CEA 4.0 (H) 5.5 (H) 5.0 (H) 5.3 (H) 5.5 (H)         Assessment/Plan:  1. Metastatic inflammatory breast cancer: ER negative, VA negative and HER-2 amplified with replacement of the right breast and multiple metastases to bone, liver, lungs, brain, nodes, and skin. S/p multiple systemic therapies and currently on Herceptin, carboplatin, and gemcitabine every 2 weeks. Echocardiogram is up to date from November. She is tolerated treatment well other than mild constipation and maybe mild anorexia. Her counts  are adequate to continue with cycle 4 today. Her tumor markers have trended up slightly though R breast mass is measuring smaller. Follow-up with Dr. Mares 1/26 with restaging 2/8 and then follow-up 2/9. They will call with any interval concerns.     2. Bone mets: Currently treated with xgeva. She is on vitamin D and calcium supplementation. Due today.     3. Brain mets: S/p surgical resection, WBRT, gamma knife to 2 lesions and then further gamma knife to 13. She has follow-up brain MRI on 2/27 and sees Dr. Liu the same day.     4. Weight loss: Weight currently at 95 pounds. Encouraged her to include more ice cream, chips, sweets, and supplement shakes.    5. Rehab: Strength and mobility are stable. Currently with PT 3x per week outpatient.     6. Neuropathy: Start gabapentin 100 mg q hs.     Harriet Bartlett PA-C    [facility]  [address] [place] [id number] [phone]

## 2017-01-12 NOTE — MR AVS SNAPSHOT
After Visit Summary   1/12/2017    Venus Hyatt    MRN: [id number]           Patient Information     Date Of Birth          1940        Visit Information        Provider Department      1/12/2017 12:30 PM UC 30 ATC; [facility] ONCOLOGY INFUSION [facility]        Today's Diagnoses     Cancer of nipple of right breast (H)    -  1     Bone metastases (H)           Care Instructions          January 2017 Sunday Monday Tuesday Wednesday Thursday Friday Saturday   1     2     3     4     5     6     7       8     9     10     11     12     [facility]   10:30 AM   (15 min.)    [facility] DRAW   [facility] Lab Draw     [facility] RETURN   11:00 AM   (50 min.)   Harriet Bartlett PA   [facility]     [facility] ONC INFUSION 180   12:30 PM   (180 min.)    ONCOLOGY INFUSION   [facility] 13     14       15     16     17     18     19     20     21       22     23     24     25     26     [facility] DRAW    9:30 AM   (15 min.)    [facility] DRAW   [facility] Lab Draw     [facility] RETURN   10:00 AM   (30 min.)   Ken Mares MD   [facility]     [facility] ONC INFUSION 180   10:30 AM   (180 min.)    ONCOLOGY INFUSION   [facility] 27     28       29     30     31 February 2017 Sunday Monday Tuesday Wednesday Thursday Friday Saturday                  1     2     3     4       5     6     7     8     CT CHEST/ABDOMEN/PELVIS W   10:40 AM   (20 min.)   CT2   [facility] CT 9     10     11       12     13     14     15     16     17     18       19     20     21     22     23     24     25       26     27     MR BRAIN WWO   12:15 PM   (45 min.)   UCMR1   [facility] MRI     [facility] RETURN    3:00 PM   (15 min.)   Bernardo Liu MD   [facility] 28                                      Lab Results:  Recent Results  (from the past 12 hour(s))   Ca27.29  breast tumor marker    Collection Time: 01/12/17 11:00 AM   Result Value Ref Range    CA 27-29 60 (H) 0 - 39 U/mL   CBC with platelets differential    Collection Time: 01/12/17 11:00 AM   Result Value Ref Range    WBC 3.1 (L) 4.0 - 11.0 10e9/L    RBC Count 3.41 (L) 3.8 - 5.2 10e12/L    Hemoglobin 10.9 (L) 11.7 - 15.7 g/dL    Hematocrit 31.9 (L) 35.0 - 47.0 %    MCV 94 78 - 100 fl    MCH 32.0 26.5 - 33.0 pg    MCHC 34.2 31.5 - 36.5 g/dL    RDW 16.9 (H) 10.0 - 15.0 %    Platelet Count 115 (L) 150 - 450 10e9/L    Diff Method Automated Method     % Neutrophils 74.0 %    % Lymphocytes 10.3 %    % Monocytes 13.2 %    % Eosinophils 1.3 %    % Basophils 0.6 %    % Immature Granulocytes 0.6 %    Nucleated RBCs 0 0 /100    Absolute Neutrophil 2.3 1.6 - 8.3 10e9/L    Absolute Lymphocytes 0.3 (L) 0.8 - 5.3 10e9/L    Absolute Monocytes 0.4 0.0 - 1.3 10e9/L    Absolute Eosinophils 0.0 0.0 - 0.7 10e9/L    Absolute Basophils 0.0 0.0 - 0.2 10e9/L    Abs Immature Granulocytes 0.0 0 - 0.4 10e9/L    Absolute Nucleated RBC 0.0    CEA    Collection Time: 01/12/17 11:00 AM   Result Value Ref Range    CEA 5.5 (H) 0 - 2.5 ug/L   Comprehensive metabolic panel    Collection Time: 01/12/17 11:00 AM   Result Value Ref Range    Sodium 144 133 - 144 mmol/L    Potassium 3.6 3.4 - 5.3 mmol/L    Chloride 109 94 - 109 mmol/L    Carbon Dioxide 28 20 - 32 mmol/L    Anion Gap 7 3 - 14 mmol/L    Glucose 130 (H) 70 - 99 mg/dL    Urea Nitrogen 15 7 - 30 mg/dL    Creatinine 0.37 (L) 0.52 - 1.04 mg/dL    GFR Estimate >90  Non  GFR Calc   >60 mL/min/1.7m2    GFR Estimate If Black >90   GFR Calc   >60 mL/min/1.7m2    Calcium 8.7 8.5 - 10.1 mg/dL    Bilirubin Total 1.2 0.2 - 1.3 mg/dL    Albumin 3.0 (L) 3.4 - 5.0 g/dL    Protein Total 6.0 (L) 6.8 - 8.8 g/dL    Alkaline Phosphatase 64 40 - 150 U/L    ALT 25 0 - 50 U/L    AST 20 0 - 45 U/L               Follow-ups after your visit        Your  next 10 appointments already scheduled     Jan 26, 2017  9:30 AM   [facility] Draw with  [facility] DRAW   [facility] Lab Draw ([facility])    [address]  2[address]  [place] [facility] [address]   [phone]            Jan 26, 2017 10:00 AM   (Arrive by 9:45 AM)   Return Visit with Ken Mares MD   [facility] ([facility])    [address]  [address] [facility] [address]   [phone]            Jan 26, 2017 10:30 AM   Infusion 180 with  ONCOLOGY INFUSION, UC 22 ATC   [facility] ([facility])    [address]  [address]  [place] [address]   [phone]            Feb 08, 2017 10:40 AM   (Arrive by 10:25 AM)   CT CHEST/ABDOMEN/PELVIS W CONTRAST with UCCT2   [facility] CT ([facility])    [address]  1st [address]  [place] [address] [phone]           Please bring any scans or X-rays taken at other hospitals, if similar tests were done. Also bring a list of your medicines, including vitamins, minerals and over-the-counter drugs. It is safest to leave personal items at home.  Be sure to tell your doctor:   If you have any allergies.   If there s any chance you are pregnant.   If you are breastfeeding.   If you have any special needs.  You may have contrast for this exam. To prepare:   Do not eat or drink for 2 hours before your exam. If you need to take medicine, you may take it with small sips of water. (We may ask you to take liquid medicine as well.)   The day before your exam, drink extra fluids at least six 8-ounce glasses (unless your doctor tells you to restrict your fluids).  Patients over 70 or patients with diabetes or kidney problems:   If you haven t had a blood test (creatinine test) within the last 30 days, go to your clinic or Diagnostic Imaging Department for this test.  If  you have diabetes:   If your kidney function is normal, continue taking your metformin (Avandamet, Glucophage, Glucovance, Metaglip) on the day of your exam.   If your kidney function is abnormal, wait 48 hours before restarting this medicine.  You will have oral contrast for this exam:   You will drink the contrast at home. Get this from your clinic or Diagnostic Imaging Department. Please follow the directions given.  Please wear loose clothing, such as a sweat suit or jogging clothes. Avoid snaps, zippers and other metal. We may ask you to undress and put on a hospital gown.  If you have any questions, please call the Imaging Department where you will have your exam.            Feb 27, 2017 12:15 PM   (Arrive by 12:00 PM)   MR BRAIN W/O & W CONTRAST with [address] MRI ([facility] [place])    [address] [address] [phone]           Take your medicines as usual, unless your doctor tells you not to. Bring a list of your current medicines to your exam (including vitamins, minerals and over-the-counter drugs).  You will be given intravenous contrast for this exam. To prepare:   The day before your exam, drink extra fluids at least six 8-ounce glasses (unless your doctor tells you to restrict your fluids).   Have a blood test (creatinine test) within 30 days of your exam. Go to your clinic or Diagnostic Imaging Department for this test.  The MRI machine uses a strong magnet. Please wear clothes without metal (snaps, zippers). A sweatsuit works well, or we may give you a hospital gown.  Please remove any body piercings and hair extensions before you arrive. You will also remove watches, jewelry, hairpins, wallets, dentures, partial dental plates and hearing aids. You may wear contact lenses, and you may be able to wear your rings. We have a safe place to keep your personal items, but it is safer to leave them at home.   **IMPORTANT** THE  INSTRUCTIONS BELOW ARE ONLY FOR THOSE PATIENTS WHO HAVE BEEN TOLD THEY WILL RECEIVE SEDATION OR GENERAL ANESTHESIA DURING THEIR MRI PROCEDURE:  IF YOU WILL RECEIVE SEDATION (take medicine to help you relax during your exam):   You must get the medicine from your doctor before you arrive. Bring the medicine to the exam. Do not take it at home.   Arrive one hour early. Bring someone who can take you home after the test. Your medicine will make you sleepy. After the exam, you may not drive, take a bus or take a taxi by yourself.   No eating 8 hours before your exam. You may have clear liquids up until 4 hours before your exam. (Clear liquids include water, clear tea, black coffee and fruit juice without pulp.)  IF YOU WILL RECEIVE ANESTHESIA (be asleep for your exam):   Arrive 1 1/2 hours early. Bring someone who can take you home after the test. You may not drive, take a bus or take a taxi by yourself.   No eating 8 hours before your exam. You may have clear liquids up until 4 hours before your exam. (Clear liquids include water, clear tea, black coffee and fruit juice without pulp.)  Please call the Imaging Department at your exam site with any questions.            Feb 27, 2017  3:00 PM   (Arrive by 2:45 PM)   Return Visit with Bernardo Liu MD   [facility] ([email or web address] [place])    [address] [place] [address] [phone]              Who to contact     If you have questions or need follow up information about today's clinic visit or your schedule please contact [facility] directly at [phone].  Normal or non-critical lab and imaging results will be communicated to you by MyChart, letter or phone within 4 business days after the clinic has received the results. If you do not hear from us within 7 days, please contact the clinic through MyChart or phone. If you have a critical or abnormal lab result, we will notify you  by phone as soon as possible.  Submit refill requests through [employer] or call your pharmacy and they will forward the refill request to us. Please allow 3 business days for your refill to be completed.          Additional Information About Your Visit        [employer] Information     [employer] gives you secure access to your electronic health record. If you see a primary care provider, you can also send messages to your care team and make appointments. If you have questions, please call your primary care clinic.  If you do not have a primary care provider, please call [phone] and they will assist you.        Care EveryWhere ID     This is your Care EveryWhere ID. This could be used by other organizations to access your [facility] medical records  [id number]         Blood Pressure from Last 3 Encounters:   01/12/17 120/61   12/29/16 120/66   12/15/16 118/64    Weight from Last 3 Encounters:   01/12/17 43.137 kg (95 lb 1.6 oz)   12/29/16 43.908 kg (96 lb 12.8 oz)   12/15/16 43.545 kg (96 lb)              We Performed the Following     CBC with platelets differential     Comprehensive metabolic panel     Treatment Conditions          Today's Medication Changes          These changes are accurate as of: 1/12/17  2:41 PM.  If you have any questions, ask your nurse or doctor.               Start taking these medicines.        Dose/Directions    gabapentin 100 MG capsule   Commonly known as:  NEURONTIN   Used for:  Neuropathy (H)   Started by:  Harriet Bartlett PA        Dose:  100 mg   Take 1 capsule (100 mg) by mouth At Bedtime   Quantity:  90 capsule   Refills:  0         These medicines have changed or have updated prescriptions.        Dose/Directions    amLODIPine 5 MG tablet   Commonly known as:  NORVASC   Indication:  Only take if bp is over 110 systolic   This may have changed:  when to take this   Used for:  HTN (hypertension)        Dose:  5 mg   Take 1 tablet (5 mg) by mouth 2 times daily   Quantity:  180  tablet   Refills:  1            Where to get your medicines      These medications were sent to [id number] - [place], [place] - [address] [facility] [address]  [address]  [address], [place] [address]     Phone:  [phone]    - gabapentin 100 MG capsule  - levETIRAcetam 500 MG tablet             Primary Care Provider Office Phone # Fax #    Ken Ferguson [phone]       [address] [address]        Thank you!     Thank you for choosing [facility]  for your care. Our goal is always to provide you with excellent care. Hearing back from our patients is one way we can continue to improve our services. Please take a few minutes to complete the written survey that you may receive in the mail after your visit with us. Thank you!             Your Updated Medication List - Protect others around you: Learn how to safely use, store and throw away your medicines at www.disposemymeds.org.          This list is accurate as of: 1/12/17  2:41 PM.  Always use your most recent med list.                   Brand Name Dispense Instructions for use    acetaminophen 650 MG 8 hour tablet     100 tablet    650 mg by Oral or Feeding Tube route every 6 hours as needed for mild pain       amLODIPine 5 MG tablet    NORVASC    180 tablet    Take 1 tablet (5 mg) by mouth 2 times daily       calcium carbonate 500 MG tablet    OS-CARLIN 500 mg [place]. Ca     Take 500 mg by mouth daily       CENTRUM PO      Take 1 tablet by mouth daily       diazepam 5 MG tablet    VALIUM    2 tablet    Take one tablet 30 minutes prior to MRI. May repeat times one       DOCUSATE SODIUM PO      Take 100 mg by mouth 2 times daily as needed for constipation       gabapentin 100 MG capsule    NEURONTIN    90 capsule    Take 1 capsule (100 mg) by mouth At Bedtime       heparin lock flush 10 UNIT/ML Soln      5 mLs by Intracatheter route  every 24 hours       levETIRAcetam 500 MG tablet    KEPPRA    60 tablet    Take 1 tablet (500 mg) by mouth 2 times daily       LORazepam 0.5 MG tablet    ATIVAN    30 tablet    Take 1 tablet (0.5 mg) by mouth every 4 hours as needed (Anxiety, Nausea/Vomiting or Sleep)       ondansetron 8 MG tablet    ZOFRAN    10 tablet    Take 1 tablet (8 mg) by mouth every 8 hours as needed (Nausea/Vomiting)       order for DME     1 Units    Cranial prosthesis       prochlorperazine 10 MG tablet    COMPAZINE    20 tablet    Take 0.5 tablets (5 mg) by mouth every 6 hours as needed for nausea or vomiting       VITAMIN C PO      Take 500 mg by mouth daily       VITAMIN D (CHOLECALCIFEROL) PO      Take 2,000 Units by mouth daily

## 2017-01-22 NOTE — PROGRESS NOTES
HPI: Venus Hyatt was referred to our clinic for evaluation and treatment of her ER-negative, NM-negative, HER2 amplification-positive, invasive ductal carcinoma of the right breast with extensive local regional inflammatory involvement as well as distant metastatic disease.    Ms. Hyatt was in her usual state of good health until September 2013. She had not seen a physician for more than 10 years. She had not had prior mammograms for at least the past 10 years. She first noted changes in the right breast with some skin redness. These changes progressed until the nipple was effaced, and she sought medical attention in November after these changes had been present and progressing for more than two months. She finally went to the emergency room on November 13, 2013, with discomfort in the right chest wall. At that time, she had redness of the right arm and developed sepsis with Staphylococcus coagulase-negative with a cellulitis superinfection of the right breast as well as the right upper arm and right lower arm. She underwent a chest CT which showed right axillary and possibly left axillary lymphadenopathy, multiple pulmonary nodules, mediastinal and bilateral hilar lymphadenopathy, and multiple hepatic metastases. She did undergo a breast biopsy on November 14, 2013, which revealed an ER-negative, NM-negative, HER2-amplified breast cancer with the amplification ratio being greater than 5.9. There was no angiolymphatic invasion. The invasive ductal carcinoma was grade 3. No ductal carcinoma in situ was seen on the biopsy. She also was noted to have redness of the right half of her posterior torso. She had a right breast wound which grew heavy growth of beta-hemolytic Streptococcus group G. She also had right upper extremity edema. On the day of discharge, Venus was feeling relatively well. She had no fevers since admission. She was eating and drinking, her rash improved on empiric antibiotics, and she was  discharged on Keflex. The culture of the right breast grew heavy growth beta-hemolytic streptococcus group G. This isolate was presumed to be clindamycin resistant in detection of inducible clindamycin resistance. The organism was susceptible to penicillin as well as ceftriaxone, cefotaxime and ampicillin. The patient has no known drug allergies, and she was discharged on Keflex. She was seen in clinic and begun on the Wilma regimen of pertuzumab, trastuzumab and weekly paclitaxel substituted for docetaxel.    Therapies:  a. Pertuzumab, trastuzumab, weekly paclitaxel.  b. Kadcyla.  C. Capecitabine and trastuzumab.  - Gamma knife to cerebellar lesion 03/22/2016  D. Capecitabine and lapatinib 5-17-16  H. Trastuzumab and eribulin  I. Metronomic cyclophosphamide and methotrexate with trastuzumab  H. Gemcitabine, carboplatin every two weeks with trastuzumab.    FOLLOWUP NOTE      INTERVAL HISTORY:  Venus returns to the clinic for followup on gemcitabine, carboplatin and Herceptin.  Venus has actually been doing quite well.  She has gained a little bit of weight; her weight is up about a kilogram or so.  She says she has been feeling quite well, and she has no lower extremity edema.  She is having physical therapy, and the physical therapy is going well.  She has no pain, no fatigue and no depression, but a little bit of anxiety.      REVIEW OF SYSTEMS:  She denies fevers or chills, cough, chest pain or shortness of breath, nausea or vomiting, but does have constipation.  No headache.  No bone pain, but she does have some mild back pain.  She has had no complaints of skin breakdown or discomfort.  She and her  have been using foam and helping prevent bedsores.  The remainder of a 10-point review of systems is negative.      PHYSICAL EXAMINATION:   VITAL SIGNS:  Blood pressure 110/62, temperature 99.1, pulse 87, respirations normal, O2 sat 97% on room air and weight 44.5 kg.   GENERAL:  Venus appeared  generally well.  She has alopecia related to her radiation and chemotherapy.   HEENT:  No lesions in the oropharynx.   LYMPH:  There is no palpable cervical, supraclavicular, subclavicular or axillary lymphadenopathy.  She does have a 5 cm mass on the right trunk in the mid axillary line, which measures about 5 cm in size by palpation.  This mass is fixed to the chest wall.   BREASTS:  Exam was not performed today.   LUNGS:  Exam is remarkable for decreased breath sounds and dullness at the right base.   HEART:  Regular rate and rhythm, S1, S2.   ABDOMEN:  Soft and nontender.   EXTREMITIES:  Without edema.   PSYCHIATRIC:  Mood and affect were normal.  Venus was able to communicate clearly, although her speech is slow and halting.  Her , Colt, accompanied her on the visit. Speech is stable.  She comprehends well and despite halting speech is able to communicate well.  Generally cheerful.     LABORATORY DATA:  The CMP showed a creatinine of 0.36, albumin of 3.1, glucose 111.  CBC showed a WBC of 3, hemoglobin 9.3, platelets 107,000, which is steady over the past month.  Absolute neutrophil count 2100.      IMAGING:  Her chest x-ray, 2 view, shows clear lungs to my exam with no evidence of pleural effusion.  We await the final radiology review.      ASSESSMENT AND PLAN:   1.  Venus Hyatt is a 76-year-old woman with a history of metastatic ER-negative, VT-negative and HER2-amplified breast cancer of the right breast.  She presented in 11/2013 with very extensive metastatic disease involving the bones, liver, lungs, axillary and mediastinal lymph nodes, and a rash across the posterior trunk and down the right arm to the forearm.  She was started on the WILFREDO regimen, and had an excellent response.  She had progression, and was treated with Kadcyla, but had brain metastases.  The brain metastases were treated with surgery followed by whole brain radiation.  Most recently, she has been on gemcitabine,  carboplatin and Herceptin begun on 12/01/2016.  She has tolerated this treatment quite well.  She will continue for a total of 12 weeks, and then we will repeat a CT of the chest, abdomen and pelvis.  Notably, Venus's performance status is stable at ECOG 2.  She is awake most of the day, although she does spend most of the day sitting in a chair because of her disability.  She is taking part in physical therapy.   2.  Physical therapy is ongoing, and Venus is enjoying the physical therapy.   3.  Speech.  Venus's speech is somewhat less fluent, but she is able to clearly converse and express her opinions, and she is in favor of continuing chemotherapy.   4.  Neurosurgery evaluation by Dr. Bernardo Liu will be 02/27, and a brain MRI the day of the visit.   5.  Discussion of exercise.  Venus will exercise with physical therapy.   6.  Discussion of diet.  Chiaras diet has improved somewhat, and she is gaining some weight.   7.  Follow up.  Venus affirmed her agreement with the plan of care. We will see Venus in followup in our clinic in 2 weeks. CT CAP on 2-8.  Follow up with me on 2-9.  CBC, CMP, CA27.29 and CEA.  Gemcitabine, Carboplatin and Herceptin on 2-9.        Thank you for allowing us to continue to participate in Venus Hyatt' care.      Ken Mares MD          [facility]     ADDENDUM:  CXR does not show effusions but does show density in left lung base.  Will follow up with CT CAP in February as scheduled.     I spent 30 minutes with the patient more than 50% of which was in counseling and coordination of care.

## 2017-01-26 NOTE — PATIENT INSTRUCTIONS
Contact Numbers    [facility] [place]: [phone]  [facility] [place]:  [phone]    Call triage with chills and/or temperature greater than or equal to 100.5, uncontrolled nausea/vomiting, diarrhea, constipation, dizziness, shortness of breath, chest pain, bleeding, unexplained bruising, or any new/concerning symptoms, questions/concerns.     If you are having any concerning symptoms or wish to speak to a provider before your next infusion visit, please call your care coordinator or triage to notify them so we can adequately serve you.       After Hours: [phone]    If after hours, weekends, or holidays, call main hospital  and ask for Oncology doctor on call.        January 2017 Sunday Monday Tuesday Wednesday Thursday Friday Saturday   1     2     3     4     5     6     7       8     9     10     11     12     [facility] LAB DRAW   10:30 AM   (15 min.)    [facility] LAB DRAW   [facility] Lab Draw     [facility] RETURN   11:00 AM   (50 min.)   Harriet Bartlett PA   [facility] ONC INFUSION 180   12:30 PM   (180 min.)    ONCOLOGY INFUSION   [facility] 13     14       15     16     17     18     19     20     21       22     23     24     25     26     [facility] DRAW    9:30 AM   (15 min.)    [facility] DRAW   [facility] Lab Draw     [facility] RETURN   10:00 AM   (30 min.)   Ken Mares MD   [facility] ONC INFUSION 180   10:30 AM   (180 min.)    ONCOLOGY INFUSION   [facility]     XR CHEST 2 VIEWS    3:00 PM   (15 min.)   UCXR1   [facility] Xray 27     28       29     30     31 February 2017 Sunday Monday Tuesday Wednesday Thursday Friday Saturday                  1     2     3     4       5     6     7     8     CT CHEST/ABDOMEN/PELVIS W   10:40 AM   (20 min.)   UCCT2   [facility] CT 9     10     11       12     13      14     15     16     17     18       19     20     21     22     23     24     25       26     27     MR BRAIN WWO   12:15 PM   (45 min.)   [facility] MRI     [facility] RETURN    3:00 PM   (15 min.)   Bernardo Liu MD   [facility] Cancer [facility] 28                                      Lab Results:  Recent Results (from the past 12 hour(s))   CBC with platelets differential    Collection Time: 01/26/17 10:33 AM   Result Value Ref Range    WBC 3.0 (L) 4.0 - 11.0 10e9/L    RBC Count 2.84 (L) 3.8 - 5.2 10e12/L    Hemoglobin 9.3 (L) 11.7 - 15.7 g/dL    Hematocrit 27.5 (L) 35.0 - 47.0 %    MCV 97 78 - 100 fl    MCH 32.7 26.5 - 33.0 pg    MCHC 33.8 31.5 - 36.5 g/dL    RDW 17.5 (H) 10.0 - 15.0 %    Platelet Count 107 (L) 150 - 450 10e9/L    Diff Method Automated Method     % Neutrophils 71.4 %    % Lymphocytes 11.8 %    % Monocytes 13.5 %    % Eosinophils 2.7 %    % Basophils 0.3 %    % Immature Granulocytes 0.3 %    Nucleated RBCs 0 0 /100    Absolute Neutrophil 2.1 1.6 - 8.3 10e9/L    Absolute Lymphocytes 0.4 (L) 0.8 - 5.3 10e9/L    Absolute Monocytes 0.4 0.0 - 1.3 10e9/L    Absolute Eosinophils 0.1 0.0 - 0.7 10e9/L    Absolute Basophils 0.0 0.0 - 0.2 10e9/L    Abs Immature Granulocytes 0.0 0 - 0.4 10e9/L    Absolute Nucleated RBC 0.0    Comprehensive metabolic panel    Collection Time: 01/26/17 10:33 AM   Result Value Ref Range    Sodium 144 133 - 144 mmol/L    Potassium 3.8 3.4 - 5.3 mmol/L    Chloride 109 94 - 109 mmol/L    Carbon Dioxide 27 20 - 32 mmol/L    Anion Gap 7 3 - 14 mmol/L    Glucose 111 (H) 70 - 99 mg/dL    Urea Nitrogen 12 7 - 30 mg/dL    Creatinine 0.36 (L) 0.52 - 1.04 mg/dL    GFR Estimate >90  Non  GFR Calc   >60 mL/min/1.7m2    GFR Estimate If Black >90   GFR Calc   >60 mL/min/1.7m2    Calcium 8.5 8.5 - 10.1 mg/dL    Bilirubin Total 0.9 0.2 - 1.3 mg/dL    Albumin 3.1 (L) 3.4 - 5.0 g/dL    Protein Total 5.9 (L) 6.8 - 8.8 g/dL    Alkaline  Phosphatase 64 40 - 150 U/L    ALT 22 0 - 50 U/L    AST 19 0 - 45 U/L

## 2017-01-26 NOTE — Clinical Note
1/26/2017       RE: Venus Hyatt  [address]  [place] [address]     Dear Colleague,    Thank you for referring your patient, Venus Hyatt, to the [facility]. Please see a copy of my visit note below.    HPI: Venus Hyatt was referred to our clinic for evaluation and treatment of her ER-negative, LA-negative, HER2 amplification-positive, invasive ductal carcinoma of the right breast with extensive local regional inflammatory involvement as well as distant metastatic disease.    Ms. Hyatt was in her usual state of good health until September 2013. She had not seen a physician for more than 10 years. She had not had prior mammograms for at least the past 10 years. She first noted changes in the right breast with some skin redness. These changes progressed until the nipple was effaced, and she sought medical attention in November after these changes had been present and progressing for more than two months. She finally went to the emergency room on November 13, 2013, with discomfort in the right chest wall. At that time, she had redness of the right arm and developed sepsis with Staphylococcus coagulase-negative with a cellulitis superinfection of the right breast as well as the right upper arm and right lower arm. She underwent a chest CT which showed right axillary and possibly left axillary lymphadenopathy, multiple pulmonary nodules, mediastinal and bilateral hilar lymphadenopathy, and multiple hepatic metastases. She did undergo a breast biopsy on November 14, 2013, which revealed an ER-negative, LA-negative, HER2-amplified breast cancer with the amplification ratio being greater than 5.9. There was no angiolymphatic invasion. The invasive ductal carcinoma was grade 3. No ductal carcinoma in situ was seen on the biopsy. She also was noted to have redness of the right half of her posterior torso. She had a right breast wound which grew heavy growth of beta-hemolytic  Streptococcus group G. She also had right upper extremity edema. On the day of discharge, Venus was feeling relatively well. She had no fevers since admission. She was eating and drinking, her rash improved on empiric antibiotics, and she was discharged on Keflex. The culture of the right breast grew heavy growth beta-hemolytic streptococcus group G. This isolate was presumed to be clindamycin resistant in detection of inducible clindamycin resistance. The organism was susceptible to penicillin as well as ceftriaxone, cefotaxime and ampicillin. The patient has no known drug allergies, and she was discharged on Keflex. She was seen in clinic and begun on the Wilma regimen of pertuzumab, trastuzumab and weekly paclitaxel substituted for docetaxel.    Therapies:  a. Pertuzumab, trastuzumab, weekly paclitaxel.  b. Kadcyla.  C. Capecitabine and trastuzumab.  - Gamma knife to cerebellar lesion 03/22/2016  D. Capecitabine and lapatinib 5-17-16  H. Trastuzumab and eribulin  I. Metronomic cyclophosphamide and methotrexate with trastuzumab  H. Gemcitabine, carboplatin every two weeks with trastuzumab.    FOLLOWUP NOTE      INTERVAL HISTORY:  Venus returns to the clinic for followup on gemcitabine, carboplatin and Herceptin.  Venus has actually been doing quite well.  She has gained a little bit of weight; her weight is up about a kilogram or so.  She says she has been feeling quite well, and she has no lower extremity edema.  She is having physical therapy, and the physical therapy is going well.  She has no pain, no fatigue and no depression, but a little bit of anxiety.      REVIEW OF SYSTEMS:  She denies fevers or chills, cough, chest pain or shortness of breath, nausea or vomiting, but does have constipation.  No headache.  No bone pain, but she does have some mild back pain.  She has had no complaints of skin breakdown or discomfort.  She and her  have been using foam and helping prevent bedsores.  The  remainder of a 10-point review of systems is negative.      PHYSICAL EXAMINATION:   VITAL SIGNS:  Blood pressure 110/62, temperature 99.1, pulse 87, respirations normal, O2 sat 97% on room air and weight 44.5 kg.   GENERAL:  Venus appeared generally well.  She has alopecia related to her radiation and chemotherapy.   HEENT:  No lesions in the oropharynx.   LYMPH:  There is no palpable cervical, supraclavicular, subclavicular or axillary lymphadenopathy.  She does have a 5 cm mass on the right trunk in the mid axillary line, which measures about 5 cm in size by palpation.  This mass is fixed to the chest wall.   BREASTS:  Exam was not performed today.   LUNGS:  Exam is remarkable for decreased breath sounds and dullness at the right base.   HEART:  Regular rate and rhythm, S1, S2.   ABDOMEN:  Soft and nontender.   EXTREMITIES:  Without edema.   PSYCHIATRIC:  Mood and affect were normal.  Venus was able to communicate clearly, although her speech is slow and halting.  Her , Colt, accompanied her on the visit. Speech is stable.  She comprehends well and despite halting speech is able to communicate well.  Generally cheerful.     LABORATORY DATA:  The CMP showed a creatinine of 0.36, albumin of 3.1, glucose 111.  CBC showed a WBC of 3, hemoglobin 9.3, platelets 107,000, which is steady over the past month.  Absolute neutrophil count 2100.      IMAGING:  Her chest x-ray, 2 view, shows clear lungs to my exam with no evidence of pleural effusion.  We await the final radiology review.      ASSESSMENT AND PLAN:   1.  Venus Hyatt is a 76-year-old woman with a history of metastatic ER-negative, TN-negative and HER2-amplified breast cancer of the right breast.  She presented in 11/2013 with very extensive metastatic disease involving the bones, liver, lungs, axillary and mediastinal lymph nodes, and a rash across the posterior trunk and down the right arm to the forearm.  She was started on the WILFREDO regimen,  and had an excellent response.  She had progression, and was treated with Kadcyla, but had brain metastases.  The brain metastases were treated with surgery followed by whole brain radiation.  Most recently, she has been on gemcitabine, carboplatin and Herceptin begun on 12/01/2016.  She has tolerated this treatment quite well.  She will continue for a total of 12 weeks, and then we will repeat a CT of the chest, abdomen and pelvis.  Notably, Venus's performance status is stable at ECOG 2.  She is awake most of the day, although she does spend most of the day sitting in a chair because of her disability.  She is taking part in physical therapy.   2.  Physical therapy is ongoing, and Venus is enjoying the physical therapy.   3.  Speech.  Venus's speech is somewhat less fluent, but she is able to clearly converse and express her opinions, and she is in favor of continuing chemotherapy.   4.  Neurosurgery evaluation by Dr. Bernardo Liu will be 02/27, and a brain MRI the day of the visit.   5.  Discussion of exercise.  Venus will exercise with physical therapy.   6.  Discussion of diet.  Venus's diet has improved somewhat, and she is gaining some weight.   7.  Follow up.  Venus affirmed her agreement with the plan of care. We will see Venus in followup in our clinic in 2 weeks. CT CAP on 2-8.  Follow up with me on 2-9.  CBC, CMP, CA27.29 and CEA.  Gemcitabine, Carboplatin and Herceptin on 2-9.        Thank you for allowing us to continue to participate in Venus Hyatt' care.      Ken Mares MD          [facility]     ADDENDUM:  CXR does not show effusions but does show density in left lung base.  Will follow up with CT CAP in February as scheduled.     I spent 30 minutes with the patient more than 50% of which was in counseling and coordination of care.     Again, thank you for allowing me to participate in the care of your patient.       Sincerely,    Ken Mares MD

## 2017-01-26 NOTE — PROGRESS NOTES
Infusion Nursing Note:  Venus Hyatt presents today for Cycle 5 Day 1 Herceptin, Gemzar and Carboplatin.    Patient seen by provider today: Yes: Dr. Mares   present during visit today: Not Applicable.    Note: TORB Dr. Mares/Nancy Paul RN After patient is done in infusion, please have her go down for a chest xray.     Intravenous Access:  Implanted Port.    Treatment Conditions:  HGB      9.3   1/26/2017  WBC      3.0   1/26/2017   ANEU      2.1   1/26/2017  PLT      107   1/26/2017     NA      144   1/26/2017                POTASSIUM      3.8   1/26/2017        MAG      2.4   9/28/2016         CR     0.36   1/26/2017                CARLIN      8.5   1/26/2017             BILITOTAL      0.9   1/26/2017        ALBUMIN      3.1   1/26/2017                 ALT       22   1/26/2017        AST       19   1/26/2017  Results reviewed, labs MET treatment parameters, ok to proceed with treatment.  ECHO/MUGA completed 11/3/16  EF 60-65%.      Post Infusion Assessment:  Patient tolerated infusion without incident.  Blood return noted pre and post infusion.  Site patent and intact, free from redness, edema or discomfort.  No evidence of extravasations.  Access discontinued per protocol.    Discharge Plan:   Patient declined prescription refills.  Discharge instructions reviewed with: Patient and Spouse.  Patient and/or family verbalized understanding of discharge instructions and all questions answered.  Copy of AVS reviewed with patient and/or family.  Patient will return 2/1/17 for next appointment.  Patient discharged in stable condition accompanied by: self and .  Departure Mode: Wheelchair.    Neva Paul, CHRIS

## 2017-01-26 NOTE — NURSING NOTE
Chief Complaint   Patient presents with     Port Draw     Pt with hx of breast ca labs collected via portacath.

## 2017-01-26 NOTE — NURSING NOTE
"Venus Hyatt is a 76 year old female who presents for:  Chief Complaint   Patient presents with     Port Draw     Pt with hx of breast ca labs collected via portacath.      Oncology Clinic Visit     Breast Cancer        Initial Vitals:  /62 mmHg  Pulse 87  Temp(Src) 99.1  F (37.3  C) (Oral)  SpO2 97% Estimated body mass index is 17.11 kg/(m^2) as calculated from the following:    Height as of 12/15/16: 1.588 m (5' 2.5\").    Weight as of 1/12/17: 43.137 kg (95 lb 1.6 oz).. There is no height or weight on file to calculate BSA. BP completed using cuff size: small regular  Data Unavailable No LMP recorded. Patient is postmenopausal. Allergies and medications reviewed.     Medications: Medication refills not needed today.  Pharmacy name entered into [employer]:    [id number] [place], [place] - [address] AT [facility] [address] [facility] [place], [place] - [address] [facility] [place], [place] - [address] [place] [address] 1-27  Patient is not having any pain today.     6 minutes for nursing intake (face to face time)   Maryjane Restrepo CMA         "

## 2017-01-26 NOTE — MR AVS SNAPSHOT
After Visit Summary   1/26/2017    Venus Hyatt    MRN: [id number]           Patient Information     Date Of Birth          1940        Visit Information        Provider Department      1/26/2017 10:30 AM  22 ATC;  ONCOLOGY INFUSION [facility]        Today's Diagnoses     Cancer of nipple of right breast (H)    -  1     Bone metastases (H)           Care Instructions    Contact Numbers    [facility] [place]: [phone]  [facility] [place]:  [phone]    Call triage with chills and/or temperature greater than or equal to 100.5, uncontrolled nausea/vomiting, diarrhea, constipation, dizziness, shortness of breath, chest pain, bleeding, unexplained bruising, or any new/concerning symptoms, questions/concerns.     If you are having any concerning symptoms or wish to speak to a provider before your next infusion visit, please call your care coordinator or triage to notify them so we can adequately serve you.       After Hours: [phone]    If after hours, weekends, or holidays, call main hospital  and ask for Oncology doctor on call.        January 2017 Sunday Monday Tuesday Wednesday Thursday Friday Saturday   1     2     3     4     5     6     7       8     9     10     11     12     [facility] LAB DRAW   10:30 AM   (15 min.)    [facility] DRAW   [facility] Lab Draw     [facility] RETURN   11:00 AM   (50 min.)   Harriet Bartlett PA   [facility] ONC INFUSION 180   12:30 PM   (180 min.)    ONCOLOGY INFUSION   [facility] 13     14       15     16     17     18     19     20     21       22     23     24     25     26     [facility] DRAW    9:30 AM   (15 min.)    [facility] DRAW   [facility] Lab Draw     [facility] RETURN   10:00 AM   (30 min.)   Ken Mares MD   [facility] ONC INFUSION 180   10:30 AM   (180 min.)    ONCOLOGY INFUSION   [facility]  Clinic     XR CHEST 2 VIEWS    3:00 PM   (15 min.)   [address] [place] [address] Xray 27     28       29     30     31 February 2017 Sunday Monday Tuesday Wednesday Thursday Friday Saturday                  1     2     3     4       5     6     7     8     CT CHEST/ABDOMEN/PELVIS W   10:40 AM   (20 min.)   CT2   [facility] CT 9     10     11       12     13     14     15     16     17     18       19     20     21     22     23     24     25       26     27     MR BRAIN WWO   12:15 PM   (45 min.)   [address] [place] [address] MRI     [facility] RETURN    3:00 PM   (15 min.)   Bernardo Liu MD   [facility] 28                                      Lab Results:  Recent Results (from the past 12 hour(s))   CBC with platelets differential    Collection Time: 01/26/17 10:33 AM   Result Value Ref Range    WBC 3.0 (L) 4.0 - 11.0 10e9/L    RBC Count 2.84 (L) 3.8 - 5.2 10e12/L    Hemoglobin 9.3 (L) 11.7 - 15.7 g/dL    Hematocrit 27.5 (L) 35.0 - 47.0 %    MCV 97 78 - 100 fl    MCH 32.7 26.5 - 33.0 pg    MCHC 33.8 31.5 - 36.5 g/dL    RDW 17.5 (H) 10.0 - 15.0 %    Platelet Count 107 (L) 150 - 450 10e9/L    Diff Method Automated Method     % Neutrophils 71.4 %    % Lymphocytes 11.8 %    % Monocytes 13.5 %    % Eosinophils 2.7 %    % Basophils 0.3 %    % Immature Granulocytes 0.3 %    Nucleated RBCs 0 0 /100    Absolute Neutrophil 2.1 1.6 - 8.3 10e9/L    Absolute Lymphocytes 0.4 (L) 0.8 - 5.3 10e9/L    Absolute Monocytes 0.4 0.0 - 1.3 10e9/L    Absolute Eosinophils 0.1 0.0 - 0.7 10e9/L    Absolute Basophils 0.0 0.0 - 0.2 10e9/L    Abs Immature Granulocytes 0.0 0 - 0.4 10e9/L    Absolute Nucleated RBC 0.0    Comprehensive metabolic panel    Collection Time: 01/26/17 10:33 AM   Result Value Ref Range    Sodium 144 133 - 144 mmol/L    Potassium 3.8 3.4 - 5.3 mmol/L    Chloride 109 94 - 109 mmol/L    Carbon Dioxide 27 20 - 32 mmol/L    Anion Gap 7 3 - 14  mmol/L    Glucose 111 (H) 70 - 99 mg/dL    Urea Nitrogen 12 7 - 30 mg/dL    Creatinine 0.36 (L) 0.52 - 1.04 mg/dL    GFR Estimate >90  Non  GFR Calc   >60 mL/min/1.7m2    GFR Estimate If Black >90   GFR Calc   >60 mL/min/1.7m2    Calcium 8.5 8.5 - 10.1 mg/dL    Bilirubin Total 0.9 0.2 - 1.3 mg/dL    Albumin 3.1 (L) 3.4 - 5.0 g/dL    Protein Total 5.9 (L) 6.8 - 8.8 g/dL    Alkaline Phosphatase 64 40 - 150 U/L    ALT 22 0 - 50 U/L    AST 19 0 - 45 U/L               Follow-ups after your visit        Your next 10 appointments already scheduled     Jan 26, 2017  3:00 PM   (Arrive by 2:45 PM)   XR CHEST 2 VIEWS with UCXR1   [facility] Xray ([facility])    [address]  [address] [facility] [id number] [phone]           Please bring a list of your current medicines to your exam. (Include vitamins, minerals and over-thecounter medicines.) Leave your valuables at home.  Tell your doctor if there is a chance you may be pregnant.  You do not need to do anything special for this exam.            Feb 08, 2017 10:40 AM   (Arrive by 10:25 AM)   CT CHEST/ABDOMEN/PELVIS W CONTRAST with UCCT2   [facility] CT ([facility])    [address]  [address] [facility] [id number] [phone]           Please bring any scans or X-rays taken at other hospitals, if similar tests were done. Also bring a list of your medicines, including vitamins, minerals and over-the-counter drugs. It is safest to leave personal items at home.  Be sure to tell your doctor:   If you have any allergies.   If there s any chance you are pregnant.   If you are breastfeeding.   If you have any special needs.  You may have contrast for this exam. To prepare:   Do not eat or drink for 2 hours before your exam. If you need to take medicine, you may take it with small sips of water. (We may ask you to take liquid medicine as well.)    The day before your exam, drink extra fluids at least six 8-ounce glasses (unless your doctor tells you to restrict your fluids).  Patients over 70 or patients with diabetes or kidney problems:   If you haven t had a blood test (creatinine test) within the last 30 days, go to your clinic or Diagnostic Imaging Department for this test.  If you have diabetes:   If your kidney function is normal, continue taking your metformin (Avandamet, Glucophage, Glucovance, Metaglip) on the day of your exam.   If your kidney function is abnormal, wait 48 hours before restarting this medicine.  You will have oral contrast for this exam:   You will drink the contrast at home. Get this from your clinic or Diagnostic Imaging Department. Please follow the directions given.  Please wear loose clothing, such as a sweat suit or jogging clothes. Avoid snaps, zippers and other metal. We may ask you to undress and put on a hospital gown.  If you have any questions, please call the Imaging Department where you will have your exam.            Feb 27, 2017 12:15 PM   (Arrive by 12:00 PM)   MR BRAIN W/O & W CONTRAST with [address] [facility] MRI ([facility])    [address] [address] [phone]           Take your medicines as usual, unless your doctor tells you not to. Bring a list of your current medicines to your exam (including vitamins, minerals and over-the-counter drugs).  You will be given intravenous contrast for this exam. To prepare:   The day before your exam, drink extra fluids at least six 8-ounce glasses (unless your doctor tells you to restrict your fluids).   Have a blood test (creatinine test) within 30 days of your exam. Go to your clinic or Diagnostic Imaging Department for this test.  The MRI machine uses a strong magnet. Please wear clothes without metal (snaps, zippers). A sweatsuit works well, or we may give you a hospital gown.  Please remove  any body piercings and hair extensions before you arrive. You will also remove watches, jewelry, hairpins, wallets, dentures, partial dental plates and hearing aids. You may wear contact lenses, and you may be able to wear your rings. We have a safe place to keep your personal items, but it is safer to leave them at home.   **IMPORTANT** THE INSTRUCTIONS BELOW ARE ONLY FOR THOSE PATIENTS WHO HAVE BEEN TOLD THEY WILL RECEIVE SEDATION OR GENERAL ANESTHESIA DURING THEIR MRI PROCEDURE:  IF YOU WILL RECEIVE SEDATION (take medicine to help you relax during your exam):   You must get the medicine from your doctor before you arrive. Bring the medicine to the exam. Do not take it at home.   Arrive one hour early. Bring someone who can take you home after the test. Your medicine will make you sleepy. After the exam, you may not drive, take a bus or take a taxi by yourself.   No eating 8 hours before your exam. You may have clear liquids up until 4 hours before your exam. (Clear liquids include water, clear tea, black coffee and fruit juice without pulp.)  IF YOU WILL RECEIVE ANESTHESIA (be asleep for your exam):   Arrive 1 1/2 hours early. Bring someone who can take you home after the test. You may not drive, take a bus or take a taxi by yourself.   No eating 8 hours before your exam. You may have clear liquids up until 4 hours before your exam. (Clear liquids include water, clear tea, black coffee and fruit juice without pulp.)  Please call the Imaging Department at your exam site with any questions.            Feb 27, 2017  3:00 PM   (Arrive by 2:45 PM)   Return Visit with Bernardo Liu MD   [facility] ([facility])    [address] [place] [id number] [phone]              Future tests that were ordered for you today     Open Future Orders        Priority Expected Expires Ordered    X-ray Chest 2 vws* STAT 1/26/2017 1/26/2018 1/26/2017             Who to contact     If you have questions or need follow up information about today's clinic visit or your schedule please contact [facility] directly at [phone].  Normal or non-critical lab and imaging results will be communicated to you by [email or web address], letter or phone within 4 business days after the clinic has received the results. If you do not hear from us within 7 days, please contact the clinic through [email or web address] or phone. If you have a critical or abnormal lab result, we will notify you by phone as soon as possible.  Submit refill requests through [employer] or call your pharmacy and they will forward the refill request to us. Please allow 3 business days for your refill to be completed.          Additional Information About Your Visit        [employer] Information     [employer] gives you secure access to your electronic health record. If you see a primary care provider, you can also send messages to your care team and make appointments. If you have questions, please call your primary care clinic.  If you do not have a primary care provider, please call [phone] and they will assist you.        Care EveryWhere ID     This is your Care EveryWhere ID. This could be used by other organizations to access your [place] medical records  [id number]         Blood Pressure from Last 3 Encounters:   01/26/17 110/62   01/12/17 120/61   12/29/16 120/66    Weight from Last 3 Encounters:   01/26/17 44.543 kg (98 lb 3.2 oz)   01/26/17 44.543 kg (98 lb 3.2 oz)   01/12/17 43.137 kg (95 lb 1.6 oz)              We Performed the Following     CBC with platelets differential     Comprehensive metabolic panel          Today's Medication Changes          These changes are accurate as of: 1/26/17 12:12 PM.  If you have any questions, ask your nurse or doctor.               These medicines have changed or have updated prescriptions.        Dose/Directions    amLODIPine 5 MG tablet   Commonly known as:  NORVASC    Indication:  Only take if bp is over 110 systolic   This may have changed:  when to take this   Used for:  HTN (hypertension)        Dose:  5 mg   Take 1 tablet (5 mg) by mouth 2 times daily   Quantity:  180 tablet   Refills:  1                Primary Care Provider Office Phone # Fax #    Ken Ferguson [phone]       [facility] [address] [address]        Thank you!     Thank you for choosing [facility]  for your care. Our goal is always to provide you with excellent care. Hearing back from our patients is one way we can continue to improve our services. Please take a few minutes to complete the written survey that you may receive in the mail after your visit with us. Thank you!             Your Updated Medication List - Protect others around you: Learn how to safely use, store and throw away your medicines at www.disposemymeds.org.          This list is accurate as of: 1/26/17 12:12 PM.  Always use your most recent med list.                   Brand Name Dispense Instructions for use    acetaminophen 650 MG 8 hour tablet     100 tablet    650 mg by Oral or Feeding Tube route every 6 hours as needed for mild pain       amLODIPine 5 MG tablet    NORVASC    180 tablet    Take 1 tablet (5 mg) by mouth 2 times daily       calcium carbonate 500 MG tablet    OS-CARLIN 500 mg [place]. Ca     Take 500 mg by mouth daily       CENTRUM PO      Take 1 tablet by mouth daily       diazepam 5 MG tablet    VALIUM    2 tablet    Take one tablet 30 minutes prior to MRI. May repeat times one       DOCUSATE SODIUM PO      Take 100 mg by mouth 2 times daily as needed for constipation       gabapentin 100 MG capsule    NEURONTIN    90 capsule    Take 1 capsule (100 mg) by mouth At Bedtime       heparin lock flush 10 UNIT/ML Soln      5 mLs by Intracatheter route every 24 hours       levETIRAcetam 500 MG tablet    KEPPRA    60 tablet    Take 1 tablet (500 mg) by mouth 2  times daily       LORazepam 0.5 MG tablet    ATIVAN    30 tablet    Take 1 tablet (0.5 mg) by mouth every 4 hours as needed (Anxiety, Nausea/Vomiting or Sleep)       ondansetron 8 MG tablet    ZOFRAN    10 tablet    Take 1 tablet (8 mg) by mouth every 8 hours as needed (Nausea/Vomiting)       order for DME     1 Units    Cranial prosthesis       prochlorperazine 10 MG tablet    COMPAZINE    20 tablet    Take 0.5 tablets (5 mg) by mouth every 6 hours as needed for nausea or vomiting       VITAMIN C PO      Take 500 mg by mouth daily       VITAMIN D (CHOLECALCIFEROL) PO      Take 2,000 Units by mouth daily

## 2017-02-06 NOTE — PROGRESS NOTES
HPI: Venus Hyatt was referred to our clinic for evaluation and treatment of her ER-negative, CA-negative, HER2 amplification-positive, invasive ductal carcinoma of the right breast with extensive local regional inflammatory involvement as well as distant metastatic disease.    Ms. Hyatt was in her usual state of good health until September 2013. She had not seen a physician for more than 10 years. She had not had prior mammograms for at least the past 10 years. She first noted changes in the right breast with some skin redness. These changes progressed until the nipple was effaced, and she sought medical attention in November after these changes had been present and progressing for more than two months. She finally went to the emergency room on November 13, 2013, with discomfort in the right chest wall. At that time, she had redness of the right arm and developed sepsis with Staphylococcus coagulase-negative with a cellulitis superinfection of the right breast as well as the right upper arm and right lower arm. She underwent a chest CT which showed right axillary and possibly left axillary lymphadenopathy, multiple pulmonary nodules, mediastinal and bilateral hilar lymphadenopathy, and multiple hepatic metastases. She did undergo a breast biopsy on November 14, 2013, which revealed an ER-negative, CA-negative, HER2-amplified breast cancer with the amplification ratio being greater than 5.9. There was no angiolymphatic invasion. The invasive ductal carcinoma was grade 3. No ductal carcinoma in situ was seen on the biopsy. She also was noted to have redness of the right half of her posterior torso. She had a right breast wound which grew heavy growth of beta-hemolytic Streptococcus group G. She also had right upper extremity edema. On the day of discharge, Venus was feeling relatively well. She had no fevers since admission. She was eating and drinking, her rash improved on empiric antibiotics, and she was  discharged on Keflex. The culture of the right breast grew heavy growth beta-hemolytic streptococcus group G. This isolate was presumed to be clindamycin resistant in detection of inducible clindamycin resistance. The organism was susceptible to penicillin as well as ceftriaxone, cefotaxime and ampicillin. The patient has no known drug allergies, and she was discharged on Keflex. She was seen in clinic and begun on the Wilma regimen of pertuzumab, trastuzumab and weekly paclitaxel substituted for docetaxel.    Therapies:  a. Pertuzumab, trastuzumab, weekly paclitaxel.  b. Kadcyla.  C. Capecitabine and trastuzumab.  - Gamma knife to cerebellar lesion 03/22/2016  D. Capecitabine and lapatinib 5-17-16  H. Trastuzumab and eribulin  I. Metronomic cyclophosphamide and methotrexate with trastuzumab  H. Gemcitabine, carboplatin every two weeks with trastuzumab.    INTERVAL HISTORY:  Venus Hyatt returns to clinic.  She is in a wheelchair and is accompanied by her , Colt.  Venus has significant problems with neuropathy and we will increase the gabapentin to 100 mg twice daily.  CT scan of the chest, abdomen and pelvis shows relatively stable disease, and she is tolerating the treatment quite well with a decent quality of life according to Venus and her , Colt.  We will therefore plan on keeping this regimen and go for another    8 weeks or 4 cycles.  She has mild fatigue but is up and around and awake most of the day and she is going to physical therapy 3 times a week, which is a highlight for her.  This is where she gets her exercise.  She has no depression, but moderate anxiety.       REVIEW OF SYSTEMS:  The remainder of a 10-point review of systems is negative.  She has the neurologic sequelae from her neurosurgery and whole brain radiation as well as gamma knife treatment.  We await restaging on 02/27.  Overall, her performance status is ECOG 1 qualified by her neurologic status.       PHYSICAL  EXAMINATION:   VITAL SIGNS:  Blood pressure 111/71, temperature 97.8, pulse 91, respirations 14, O2 sat 99% on room air, weight 43.6 kg.    GENERAL:  Venus appeared reasonably well.  She is very alert and oriented.     HEENT:  She has thinned hair related to her brain radiation.  No lesions in the oropharynx.   LYMPH:  There is no palpable cervical, supraclavicular, subclavicular or axillary lymphadenopathy.   LUNGS:  Clear to percussion and auscultation.   HEART:  There is regular rate and rhythm, S1, S2.   ABDOMEN:  Soft and nontender.   EXTREMITIES:  Without edema.   NEUROLOGIC:  Venus is alert, oriented and making jokes.  She is generally cheerful.  She has a left hemiparesis with the left arm and dense hemiparesis of the left leg.       LABORATORY DATA:  The CMP was within normal limits except for an albumin of 3.1.  CBC showed a WBC of 2.9, hemoglobin 9.1, platelets 123,000, and absolute neutrophil count is 2,100.       IMAGING:  CT scan of the chest, abdomen and pelvis showed slight increase in the size of right breast subcutaneous mass since 11/28/2016.  The more lateral right breast mass is not significantly changed.  There is slight increase in the size of an enhancing lesion in hepatic segment 7 suspicious for metastasis.  Other liver lesions are not significantly changed.  The infiltrative mass in the right latissimus dorsi is not significantly changed.  She has grossly stable sclerotic lesions scattered throughout the visualized skeleton.  No new metastatic disease is identified.  There is a fibroid uterus.       ASSESSMENT AND PLAN:   1Sierra Hyatt is a 76-year-old woman with a history of metastatic, ER-negative, [place]-negative, HER2-amplified, right breast cancer.  She presented in 11/2013 with very extensive metastatic disease involving the bones, liver, lungs, axillary and mediastinal lymph nodes, and a rash across the posterior trunk and down the right arm to the forearm.  She was started on  the WILFREDO regimen and had an excellent response.  She had progression and was treated with Kadcyla but then had brain metastases.  The brain metastases were treated with surgery followed by whole brain radiation.  She has had followup gamma knife to recurrent brain metastases.  Most recently she has been on gemcitabine, carboplatin and Herceptin beginning 12/01/2016.  She has tolerated this treatment quite well with a decent quality of life.  Her CT scan of the chest, abdomen and pelvis shows essentially stable disease.  I had a long discussion with Venus and her , Colt.  They would like to continue with treatment.  Overall, Venus's performance status is ECOG 2.  She is awake most of the day, but she does spend most of the day in the chair because of her disability.  She has been taking part in physical therapy.  She has by report no skin breakdown and has been using cushions to help prevent that.     2.  Physical therapy is ongoing.  Venus is enjoying the physical therapy.    3.  Speech.  Venus's speech is somewhat less fluent compared to 1 year ago, but she is clearly able to converse and express her opinions, and she is in favor of continuing with chemotherapy.   4.  Neurosurgery evaluation with Dr. Hipolito Liu will be on 02/27 with brain MRI the same day of the visit.  I will meet with Venus and her  the following Tuesday to discuss whether or not to continue with systemic therapy.  I did discuss hospice with Venus and her , but they both feel that if things are stable they would prefer to continue with the treatment.   5.  Discussion of exercise.  Venus is exercising with physical therapy.   6.  Discussion of diet.  Venus is working with Colt to try to maintain adequate oral intake.  Her weight has been relatively stable.    7.  Followup.  We will see Venus in followup in our clinic in 2 weeks for gemcitabine, carboplatin and Herceptin.   Follow up with me February 23  with CBC, CMP, CA27.29 and CEA and  gemcitabine/carboplatin/Herceptin.  Follow up with me March 7.         Thank you for allowing us to continue to participate in Venus Edmar' care.       Ken Mares MD,    [facility]         I spent 40 minutes with the patient more than 50% of which was in counseling and coordination of care.

## 2017-02-09 NOTE — MR AVS SNAPSHOT
After Visit Summary   2/9/2017    Venus Hyatt    MRN: [id number]           Patient Information     Date Of Birth          1940        Visit Information        Provider Department      2/9/2017 2:00 PM  11 ATC;  ONCOLOGY INFUSION [facility]        Today's Diagnoses     Cancer of nipple of right breast (H)    -  1     Bone metastases (H)           Care Instructions    Contact Numbers  [facility]: [phone]  (Choose Option 3 for triage RN)  After Hours: [phone]    Call triage with chills and/or temperature greater than or equal to 100.5, uncontrolled nausea/vomiting, diarrhea, constipation, dizziness, shortness of breath, chest pain, bleeding, unexplained bruising, or any new/concerning symptoms, questions/concerns.     If after hours, weekends, or holidays, call the main clinic number. Calls will be forwarded to the hospital , please ask for the adult oncology doctor on call.     If you are having any concerning symptoms or wish to speak to a provider before your next infusion visit, please call your care coordinator or triage to notify them so we can adequately serve you.     If you need a refill on a narcotic prescription, please call triage or your care coordinator before your infusion appointment.             February 2017 Sunday Monday Tuesday Wednesday Thursday Friday Saturday                  1     2     3     4       5     6     7     8     CT CHEST/ABDOMEN/PELVIS W   10:40 AM   (20 min.)   UCCT2   [facility] Imaging Center CT 9     [facility] LAB DRAW   12:30 PM   (15 min.)   [facility] LAB DRAW   [facility] Lab Draw     [facility] RETURN    1:00 PM   (30 min.)   Ken Mares MD   [facility] ONC INFUSION 180    2:00 PM   (180 min.)    ONCOLOGY INFUSION   [facility] 10     11       12     13     14     15     16     17     18       19     20     21     22     23     [facility]    1:30 PM   (15 min.)    [facility] DRAW   [facility] Lab Draw     [facility] RETURN    2:00 PM   (30 min.)   Ken Mares MD   [facility]     [facility] ONC INFUSION 180    2:30 PM   (180 min.)   UC ONCOLOGY INFUSION   [facility] 24     25       26     27     MR BRAIN WWO   12:15 PM   (45 min.)   MR1   [facility] MRI     [facility] RETURN    3:00 PM   (15 min.)   Bernardo Liu MD   [facility] 28 March 2017 Sunday Monday Tuesday Wednesday Thursday Friday Saturday                  1     2     3     4       5     6     7     8     9     10     11       12     13     14     15     16     [facility] RETURN    1:00 PM   (30 min.)   Ken Mares MD   [facility] 17     18       19     20     21     22     23     24     25       26     27     28     29     30     31                       Lab Results:  Recent Results (from the past 12 hour(s))   CBC with platelets differential    Collection Time: 02/09/17  1:08 PM   Result Value Ref Range    WBC 2.9 (L) 4.0 - 11.0 10e9/L    RBC Count 2.69 (L) 3.8 - 5.2 10e12/L    Hemoglobin 9.1 (L) 11.7 - 15.7 g/dL    Hematocrit 27.4 (L) 35.0 - 47.0 %     (H) 78 - 100 fl    MCH 33.8 (H) 26.5 - 33.0 pg    MCHC 33.2 31.5 - 36.5 g/dL    RDW 19.3 (H) 10.0 - 15.0 %    Platelet Count 123 (L) 150 - 450 10e9/L    Diff Method Automated Method     % Neutrophils 72.2 %    % Lymphocytes 12.9 %    % Monocytes 12.9 %    % Eosinophils 1.0 %    % Basophils 0.3 %    % Immature Granulocytes 0.7 %    Nucleated RBCs 0 0 /100    Absolute Neutrophil 2.1 1.6 - 8.3 10e9/L    Absolute Lymphocytes 0.4 (L) 0.8 - 5.3 10e9/L    Absolute Monocytes 0.4 0.0 - 1.3 10e9/L    Absolute Eosinophils 0.0 0.0 - 0.7 10e9/L    Absolute Basophils 0.0 0.0 - 0.2 10e9/L    Abs Immature Granulocytes 0.0 0 - 0.4 10e9/L    Absolute Nucleated RBC 0.0    Comprehensive  metabolic panel    Collection Time: 02/09/17  1:08 PM   Result Value Ref Range    Sodium 144 133 - 144 mmol/L    Potassium 3.8 3.4 - 5.3 mmol/L    Chloride 108 94 - 109 mmol/L    Carbon Dioxide 27 20 - 32 mmol/L    Anion Gap 8 3 - 14 mmol/L    Glucose 137 (H) 70 - 99 mg/dL    Urea Nitrogen 13 7 - 30 mg/dL    Creatinine 0.40 (L) 0.52 - 1.04 mg/dL    GFR Estimate >90  Non  GFR Calc   >60 mL/min/1.7m2    GFR Estimate If Black >90   GFR Calc   >60 mL/min/1.7m2    Calcium 8.7 8.5 - 10.1 mg/dL    Bilirubin Total 0.8 0.2 - 1.3 mg/dL    Albumin 3.1 (L) 3.4 - 5.0 g/dL    Protein Total 6.0 (L) 6.8 - 8.8 g/dL    Alkaline Phosphatase 68 40 - 150 U/L    ALT 28 0 - 50 U/L    AST 22 0 - 45 U/L   Ca27.29  breast tumor marker    Collection Time: 02/09/17  1:08 PM   Result Value Ref Range    CA 27-29 66 (H) 0 - 39 U/mL   CEA    Collection Time: 02/09/17  1:08 PM   Result Value Ref Range    CEA 7.3 (H) 0 - 2.5 ug/L               Follow-ups after your visit        Your next 10 appointments already scheduled     Feb 23, 2017  1:30 PM   [facility] Lab Draw with  [facility] LAB DRAW   [facility] Lab Draw ([facility])    [address] [place] [address]  2nd [facility] [id number] [phone]            Feb 23, 2017  2:00 PM   (Arrive by 1:45 PM)   Return Visit with Ken Mares MD   [facility] ([facility])    [address] [place] [address]  2nd [facility] [id number] [phone]            Feb 23, 2017  2:30 PM   Infusion 180 with  ONCOLOGY INFUSION, UC 19 ATC   [facility] ([facility])    [address] [place] [address]  2nd [facility] [id number] [phone]            Feb 27, 2017 12:15 PM   (Arrive by 12:00 PM)   MR BRAIN W/O & W CONTRAST with [facility] [facility] MRI ([facility])    [address]  1st [facility]  [id number] [phone]           Take your medicines as usual, unless your doctor tells you not to. Bring a list of your current medicines to your exam (including vitamins, minerals and over-the-counter drugs).  You will be given intravenous contrast for this exam. To prepare:   The day before your exam, drink extra fluids at least six 8-ounce glasses (unless your doctor tells you to restrict your fluids).   Have a blood test (creatinine test) within 30 days of your exam. Go to your clinic or Diagnostic Imaging Department for this test.  The MRI machine uses a strong magnet. Please wear clothes without metal (snaps, zippers). A sweatsuit works well, or we may give you a hospital gown.  Please remove any body piercings and hair extensions before you arrive. You will also remove watches, jewelry, hairpins, wallets, dentures, partial dental plates and hearing aids. You may wear contact lenses, and you may be able to wear your rings. We have a safe place to keep your personal items, but it is safer to leave them at home.   **IMPORTANT** THE INSTRUCTIONS BELOW ARE ONLY FOR THOSE PATIENTS WHO HAVE BEEN TOLD THEY WILL RECEIVE SEDATION OR GENERAL ANESTHESIA DURING THEIR MRI PROCEDURE:  IF YOU WILL RECEIVE SEDATION (take medicine to help you relax during your exam):   You must get the medicine from your doctor before you arrive. Bring the medicine to the exam. Do not take it at home.   Arrive one hour early. Bring someone who can take you home after the test. Your medicine will make you sleepy. After the exam, you may not drive, take a bus or take a taxi by yourself.   No eating 8 hours before your exam. You may have clear liquids up until 4 hours before your exam. (Clear liquids include water, clear tea, black coffee and fruit juice without pulp.)  IF YOU WILL RECEIVE ANESTHESIA (be asleep for your exam):   Arrive 1 1/2 hours early. Bring someone who can take you home after the test. You may not drive, take a bus or take  a taxi by yourself.   No eating 8 hours before your exam. You may have clear liquids up until 4 hours before your exam. (Clear liquids include water, clear tea, black coffee and fruit juice without pulp.)  Please call the Imaging Department at your exam site with any questions.            Feb 27, 2017  3:00 PM   (Arrive by 2:45 PM)   Return Visit with Bernardo Liu MD   [facility] Cancer [facility] ([facility])    [address] [place] [address] [phone]            Mar 16, 2017  1:00 PM   (Arrive by 12:45 PM)   Return Visit with Ken Mares MD   [facility] ([facility])    [address] [place] [address] [phone]              Who to contact     If you have questions or need follow up information about today's clinic visit or your schedule please contact [facility] CANCER [facility] directly at [phone].  Normal or non-critical lab and imaging results will be communicated to you by TradeGlobalhart, letter or phone within 4 business days after the clinic has received the results. If you do not hear from us within 7 days, please contact the clinic through [employer] Bab[employer] or phone. If you have a critical or abnormal lab result, we will notify you by phone as soon as possible.  Submit refill requests through [email or web address] or call your pharmacy and they will forward the refill request to us. Please allow 3 business days for your refill to be completed.          Additional Information About Your Visit        [email or web address] Information     [email or web address] gives you secure access to your electronic health record. If you see a primary care provider, you can also send messages to your care team and make appointments. If you have questions, please call your primary care clinic.  If you do not have a primary care provider, please call [phone] and they will assist you.        Care EveryWhere ID     This  is your Care EveryWhere ID. This could be used by other organizations to access your [facility] medical records  [id number]         Blood Pressure from Last 3 Encounters:   02/09/17 111/71   01/26/17 110/62   01/12/17 120/61    Weight from Last 3 Encounters:   02/09/17 43.636 kg (96 lb 3.2 oz)   01/26/17 44.543 kg (98 lb 3.2 oz)   01/26/17 44.543 kg (98 lb 3.2 oz)              We Performed the Following     CBC with platelets differential     Comprehensive metabolic panel     MD Instruction for Protocol     MD Instruction for Protocol     Nursing Communication 1     Treatment Conditions     Treatment Conditions          Today's Medication Changes          These changes are accurate as of: 2/9/17  4:58 PM.  If you have any questions, ask your nurse or doctor.               These medicines have changed or have updated prescriptions.        Dose/Directions    amLODIPine 5 MG tablet   Commonly known as:  NORVASC   Indication:  Only take if bp is over 110 systolic   This may have changed:  when to take this   Used for:  HTN (hypertension)        Dose:  5 mg   Take 1 tablet (5 mg) by mouth 2 times daily   Quantity:  180 tablet   Refills:  1       gabapentin 100 MG capsule   Commonly known as:  NEURONTIN   This may have changed:  when to take this   Used for:  Cancer of nipple of right breast (H), Neuropathy (H)   Changed by:  Ken Mares MD        Dose:  100 mg   Take 1 capsule (100 mg) by mouth 2 times daily   Quantity:  90 capsule   Refills:  3            Where to get your medicines      These medications were sent to [facility] - [place], [place] - [address] [facility] [address]  [address] [facility] [address], [facility] [address]     Phone:  [phone]    - gabapentin 100 MG capsule             Primary Care Provider Office Phone # Fax #    Ken Ferguson [phone]       [address] [address]         Thank you!     Thank you for choosing [facility] CANCER CLINIC  for your care. Our goal is always to provide you with excellent care. Hearing back from our patients is one way we can continue to improve our services. Please take a few minutes to complete the written survey that you may receive in the mail after your visit with us. Thank you!             Your Updated Medication List - Protect others around you: Learn how to safely use, store and throw away your medicines at www.disposemymeds.org.          This list is accurate as of: 2/9/17  4:58 PM.  Always use your most recent med list.                   Brand Name Dispense Instructions for use    acetaminophen 650 MG 8 hour tablet     100 tablet    650 mg by Oral or Feeding Tube route every 6 hours as needed for mild pain       amLODIPine 5 MG tablet    NORVASC    180 tablet    Take 1 tablet (5 mg) by mouth 2 times daily       calcium carbonate 500 MG tablet    OS-CARLIN 500 mg [place]. Ca     Take 500 mg by mouth daily       CENTRUM PO      Take 1 tablet by mouth daily       diazepam 5 MG tablet    VALIUM    2 tablet    Take one tablet 30 minutes prior to MRI. May repeat times one       DOCUSATE SODIUM PO      Take 100 mg by mouth 2 times daily as needed for constipation       gabapentin 100 MG capsule    NEURONTIN    90 capsule    Take 1 capsule (100 mg) by mouth 2 times daily       heparin lock flush 10 UNIT/ML Soln      5 mLs by Intracatheter route every 24 hours       levETIRAcetam 500 MG tablet    KEPPRA    60 tablet    Take 1 tablet (500 mg) by mouth 2 times daily       LORazepam 0.5 MG tablet    ATIVAN    30 tablet    Take 1 tablet (0.5 mg) by mouth every 4 hours as needed (Anxiety, Nausea/Vomiting or Sleep)       ondansetron 8 MG tablet    ZOFRAN    10 tablet    Take 1 tablet (8 mg) by mouth every 8 hours as needed (Nausea/Vomiting)       order for DME     1 Units    Cranial prosthesis       prochlorperazine 10 MG tablet    COMPAZINE    20 tablet     Take 0.5 tablets (5 mg) by mouth every 6 hours as needed for nausea or vomiting       VITAMIN C PO      Take 500 mg by mouth daily       VITAMIN D (CHOLECALCIFEROL) PO      Take 2,000 Units by mouth daily

## 2017-02-09 NOTE — MR AVS SNAPSHOT
After Visit Summary   2/9/2017    Venus Hyatt    MRN: [id number]           Patient Information     Date Of Birth          1940        Visit Information        Provider Department      2/9/2017 1:00 PM Ken Mares MD [facility]        Today's Diagnoses     Cancer of nipple of right breast (H)    -  1    Neuropathy (H)           Follow-ups after your visit        Your next 10 appointments already scheduled     Feb 23, 2017  1:30 PM CST   [facility] Lab Draw with [facility] LAB DRAW   [facility] Lab Draw ([facility])    [address] [place] [address]   [phone]            Feb 23, 2017  2:00 PM CST   (Arrive by 1:45 PM)   Return Visit with Ken Mares MD   [facility] ([facility])    [address] [place] [address]   [phone]            Feb 23, 2017  2:30 PM CST   Infusion 180 with  ONCOLOGY INFUSION, UC 19 ATC   [facility] ([facility])    [address] [place] [address]   [phone]            Feb 27, 2017 12:15 PM CST   (Arrive by 12:00 PM)   MR BRAIN W/O & W CONTRAST with [address] MRI ([facility])    [address] [place] [address]   [phone]           Take your medicines as usual, unless your doctor tells you not to. Bring a list of your current medicines to your exam (including vitamins, minerals and over-the-counter drugs).  You will be given intravenous contrast for this exam. To prepare:   The day before your exam, drink extra fluids at least six 8-ounce glasses (unless your doctor tells you to restrict your fluids).   Have a blood test (creatinine test) within 30 days of your exam. Go to your clinic or Diagnostic Imaging Department for this test.  The MRI  machine uses a strong magnet. Please wear clothes without metal (snaps, zippers). A sweatsuit works well, or we may give you a hospital gown.  Please remove any body piercings and hair extensions before you arrive. You will also remove watches, jewelry, hairpins, wallets, dentures, partial dental plates and hearing aids. You may wear contact lenses, and you may be able to wear your rings. We have a safe place to keep your personal items, but it is safer to leave them at home.   **IMPORTANT** THE INSTRUCTIONS BELOW ARE ONLY FOR THOSE PATIENTS WHO HAVE BEEN TOLD THEY WILL RECEIVE SEDATION OR GENERAL ANESTHESIA DURING THEIR MRI PROCEDURE:  IF YOU WILL RECEIVE SEDATION (take medicine to help you relax during your exam):   You must get the medicine from your doctor before you arrive. Bring the medicine to the exam. Do not take it at home.   Arrive one hour early. Bring someone who can take you home after the test. Your medicine will make you sleepy. After the exam, you may not drive, take a bus or take a taxi by yourself.   No eating 8 hours before your exam. You may have clear liquids up until 4 hours before your exam. (Clear liquids include water, clear tea, black coffee and fruit juice without pulp.)  IF YOU WILL RECEIVE ANESTHESIA (be asleep for your exam):   Arrive 1 1/2 hours early. Bring someone who can take you home after the test. You may not drive, take a bus or take a taxi by yourself.   No eating 8 hours before your exam. You may have clear liquids up until 4 hours before your exam. (Clear liquids include water, clear tea, black coffee and fruit juice without pulp.)  Please call the Imaging Department at your exam site with any questions.            Feb 27, 2017  3:00 PM CST   (Arrive by 2:45 PM)   Return Visit with Bernardo Liu MD   [facility] ([facility])    [address] [address] [phone]            Mar 16, 2017   1:00 PM CDT   (Arrive by 12:45 PM)   Return Visit with Ken Mares MD   [facility] ([facility])    [address] [facility]  2nd [facility] [address] [phone]              Who to contact     If you have questions or need follow up information about today's clinic visit or your schedule please contact [facility] directly at [phone].  Normal or non-critical lab and imaging results will be communicated to you by A2Zlogixhart, letter or phone within 4 business days after the clinic has received the results. If you do not hear from us within 7 days, please contact the clinic through "[employer]t or phone. If you have a critical or abnormal lab result, we will notify you by phone as soon as possible.  Submit refill requests through clinovo or call your pharmacy and they will forward the refill request to us. Please allow 3 business days for your refill to be completed.          Additional Information About Your Visit        A[employer]ZlogixharEcoIntense Information     clinovo gives you secure access to your electronic health record. If you see a primary care provider, you can also send messages to your care team and make appointments. If you have questions, please call your primary care clinic.  If you do not have a primary care provider, please call [phone] and they will assist you.        Care EveryWhere ID     This is your Care EveryWhere ID. This could be used by other organizations to access your [facility] medical records  [id number]        Your Vitals Were     Pulse Temperature Respirations Pulse Oximetry BMI (Body Mass Index)       91 97.8  F (36.6  C) (Oral) 14 99% 17.31 kg/m2        Blood Pressure from Last 3 Encounters:   02/09/17 111/71   01/26/17 110/62   01/12/17 120/61    Weight from Last 3 Encounters:   02/09/17 43.6 kg (96 lb 3.2 oz)   01/26/17 44.5 kg (98 lb 3.2 oz)   01/26/17 44.5 kg (98 lb 3.2 oz)              Today, you had the  following     No orders found for display         Today's Medication Changes          These changes are accurate as of: 2/9/17 11:59 PM.  If you have any questions, ask your nurse or doctor.               These medicines have changed or have updated prescriptions.        Dose/Directions    amLODIPine 5 MG tablet   Commonly known as:  NORVASC   Indication:  Only take if bp is over 110 systolic   This may have changed:  when to take this   Used for:  HTN (hypertension)        Dose:  5 mg   Take 1 tablet (5 mg) by mouth 2 times daily   Quantity:  180 tablet   Refills:  1       gabapentin 100 MG capsule   Commonly known as:  NEURONTIN   This may have changed:  when to take this   Used for:  Cancer of nipple of right breast (H), Neuropathy (H)   Changed by:  Ken Mares MD        Dose:  100 mg   Take 1 capsule (100 mg) by mouth 2 times daily   Quantity:  90 capsule   Refills:  3            Where to get your medicines      These medications were sent to [facility]  [place] [facility] [place] - [address] AT [facility] [address] & [facility]  [address] [facility], [place] [facility] [place] [address]     Phone:  [phone]     gabapentin 100 MG capsule                Primary Care Provider Office Phone # Fax #    Ken Ferguson [phone]       [address] [address]        Thank you!     Thank you for choosing [facility] CANCER CLINIC  for your care. Our goal is always to provide you with excellent care. Hearing back from our patients is one way we can continue to improve our services. Please take a few minutes to complete the written survey that you may receive in the mail after your visit with us. Thank you!             Your Updated Medication List - Protect others around you: Learn how to safely use, store and throw away your medicines at www.disposemymeds.org.          This list is accurate as of: 2/9/17 11:59 PM.  Always use your  most recent med list.                   Brand Name Dispense Instructions for use    acetaminophen 650 MG 8 hour tablet     100 tablet    650 mg by Oral or Feeding Tube route every 6 hours as needed for mild pain       amLODIPine 5 MG tablet    NORVASC    180 tablet    Take 1 tablet (5 mg) by mouth 2 times daily       calcium carbonate 500 MG tablet    OS-CARLIN 500 mg [place]. Ca     Take 500 mg by mouth daily       CENTRUM PO      Take 1 tablet by mouth daily       diazepam 5 MG tablet    VALIUM    2 tablet    Take one tablet 30 minutes prior to MRI. May repeat times one       DOCUSATE SODIUM PO      Take 100 mg by mouth 2 times daily as needed for constipation       gabapentin 100 MG capsule    NEURONTIN    90 capsule    Take 1 capsule (100 mg) by mouth 2 times daily       heparin lock flush 10 UNIT/ML Soln      5 mLs by Intracatheter route every 24 hours       levETIRAcetam 500 MG tablet    KEPPRA    60 tablet    Take 1 tablet (500 mg) by mouth 2 times daily       LORazepam 0.5 MG tablet    ATIVAN    30 tablet    Take 1 tablet (0.5 mg) by mouth every 4 hours as needed (Anxiety, Nausea/Vomiting or Sleep)       ondansetron 8 MG tablet    ZOFRAN    10 tablet    Take 1 tablet (8 mg) by mouth every 8 hours as needed (Nausea/Vomiting)       order for DME     1 Units    Cranial prosthesis       prochlorperazine 10 MG tablet    COMPAZINE    20 tablet    Take 0.5 tablets (5 mg) by mouth every 6 hours as needed for nausea or vomiting       VITAMIN C PO      Take 500 mg by mouth daily       VITAMIN D (CHOLECALCIFEROL) PO      Take 2,000 Units by mouth daily

## 2017-02-09 NOTE — NURSING NOTE
"Venus Hyatt is a 76 year old female who presents for:  Chief Complaint   Patient presents with     Port Draw     Labs Drawn      Oncology Clinic Visit     Return: Breast Ca        Initial Vitals:  /71 mmHg  Pulse 91  Temp(Src) 97.8  F (36.6  C) (Oral)  Resp 14  Wt 43.636 kg (96 lb 3.2 oz)  SpO2 99% Estimated body mass index is 17.3 kg/(m^2) as calculated from the following:    Height as of 12/15/16: 1.588 m (5' 2.5\").    Weight as of this encounter: 43.636 kg (96 lb 3.2 oz).. There is no height on file to calculate BSA. BP completed using cuff size: BP was taken in LAB INTAKE  No Pain (0) No LMP recorded. Patient is postmenopausal. Allergies and medications reviewed.     Medications: Medication refills not needed today.  Pharmacy name entered into [employer]:    [facility] [address] [place] [address] [id number] AT [facility] [address] & [facility] [place], [place] - [address] [facility] [place], [place] - [address] [facility] [phone]    Comments: Patient received flu vaccine. See Immunizations.  Leesa Chamberlain CMA        6 minutes for nursing intake (face to face time)   Leesa Chamberlain CMA            "

## 2017-02-09 NOTE — PATIENT INSTRUCTIONS
Contact Numbers  [facility]: [phone]  (Choose Option 3 for triage RN)  After Hours: [phone]    Call triage with chills and/or temperature greater than or equal to 100.5, uncontrolled nausea/vomiting, diarrhea, constipation, dizziness, shortness of breath, chest pain, bleeding, unexplained bruising, or any new/concerning symptoms, questions/concerns.     If after hours, weekends, or holidays, call the main clinic number. Calls will be forwarded to the hospital , please ask for the adult oncology doctor on call.     If you are having any concerning symptoms or wish to speak to a provider before your next infusion visit, please call your care coordinator or triage to notify them so we can adequately serve you.     If you need a refill on a narcotic prescription, please call triage or your care coordinator before your infusion appointment.             February 2017 Sunday Monday Tuesday Wednesday Thursday Friday Saturday                  1     2     3     4       5     6     7     8     CT CHEST/ABDOMEN/PELVIS W   10:40 AM   (20 min.)   UC[facility]2   [facility] CT 9     [facility] DRAW   12:30 PM   (15 min.)   [facility] D[facility]   [facility] Lab Draw     [facility] RETURN    1:00 PM   (30 min.)   Ken Mares MD   [facility]     [facility] ONC INFUSION 180    2:00 PM   (180 min.)    ONCOLOGY INFUSION   [facility] 10     11       12     13     14     15     16     17     18       19     20     21     22     23     [facility] DRAW    1:30 PM   (15 min.)    [facility] DRAW   [facility] Lab Draw     [facility] RETURN    2:00 PM   (30 min.)   Ken Mares MD   [facility]     [facility] ONC INFUSION 180    2:30 PM   (180 min.)    ONCOLOGY INFUSION   [facility] 24     25       26     27     MR BRAIN WWO   12:15 PM   (45 min.)   MR1   [facility] MRI     [facility] RETURN    3:00  PM   (15 min.)   Bernardo Liu MD   [facility] 28 March 2017 Sunday Monday Tuesday Wednesday Thursday Friday Saturday                  1     2     3     4       5     6     7     8     9     10     11       12     13     14     15     16     [facility] RETURN    1:00 PM   (30 min.)   Ken Mares MD   [facility] 17     18       19     20     21     22     23     24     25       26     27     28     29     30     31                       Lab Results:  Recent Results (from the past 12 hour(s))   CBC with platelets differential    Collection Time: 02/09/17  1:08 PM   Result Value Ref Range    WBC 2.9 (L) 4.0 - 11.0 10e9/L    RBC Count 2.69 (L) 3.8 - 5.2 10e12/L    Hemoglobin 9.1 (L) 11.7 - 15.7 g/dL    Hematocrit 27.4 (L) 35.0 - 47.0 %     (H) 78 - 100 fl    MCH 33.8 (H) 26.5 - 33.0 pg    MCHC 33.2 31.5 - 36.5 g/dL    RDW 19.3 (H) 10.0 - 15.0 %    Platelet Count 123 (L) 150 - 450 10e9/L    Diff Method Automated Method     % Neutrophils 72.2 %    % Lymphocytes 12.9 %    % Monocytes 12.9 %    % Eosinophils 1.0 %    % Basophils 0.3 %    % Immature Granulocytes 0.7 %    Nucleated RBCs 0 0 /100    Absolute Neutrophil 2.1 1.6 - 8.3 10e9/L    Absolute Lymphocytes 0.4 (L) 0.8 - 5.3 10e9/L    Absolute Monocytes 0.4 0.0 - 1.3 10e9/L    Absolute Eosinophils 0.0 0.0 - 0.7 10e9/L    Absolute Basophils 0.0 0.0 - 0.2 10e9/L    Abs Immature Granulocytes 0.0 0 - 0.4 10e9/L    Absolute Nucleated RBC 0.0    Comprehensive metabolic panel    Collection Time: 02/09/17  1:08 PM   Result Value Ref Range    Sodium 144 133 - 144 mmol/L    Potassium 3.8 3.4 - 5.3 mmol/L    Chloride 108 94 - 109 mmol/L    Carbon Dioxide 27 20 - 32 mmol/L    Anion Gap 8 3 - 14 mmol/L    Glucose 137 (H) 70 - 99 mg/dL    Urea Nitrogen 13 7 - 30 mg/dL    Creatinine 0.40 (L) 0.52 - 1.04 mg/dL    GFR Estimate >90  Non  GFR Calc   >60 mL/min/1.7m2    GFR  Estimate If Black >90   GFR Calc   >60 mL/min/1.7m2    Calcium 8.7 8.5 - 10.1 mg/dL    Bilirubin Total 0.8 0.2 - 1.3 mg/dL    Albumin 3.1 (L) 3.4 - 5.0 g/dL    Protein Total 6.0 (L) 6.8 - 8.8 g/dL    Alkaline Phosphatase 68 40 - 150 U/L    ALT 28 0 - 50 U/L    AST 22 0 - 45 U/L   Ca27.29  breast tumor marker    Collection Time: 02/09/17  1:08 PM   Result Value Ref Range    CA 27-29 66 (H) 0 - 39 U/mL   CEA    Collection Time: 02/09/17  1:08 PM   Result Value Ref Range    CEA 7.3 (H) 0 - 2.5 ug/L

## 2017-02-09 NOTE — LETTER
2/9/2017       RE: Venus Hyatt  [address]  [place] [id number]     Dear Colleague,    Thank you for referring your patient, Venus Hyatt, to the [facility]. Please see a copy of my visit note below.    HPI: Venus Hyatt was referred to our clinic for evaluation and treatment of her ER-negative, TX-negative, HER2 amplification-positive, invasive ductal carcinoma of the right breast with extensive local regional inflammatory involvement as well as distant metastatic disease.    Ms. Hyatt was in her usual state of good health until September 2013. She had not seen a physician for more than 10 years. She had not had prior mammograms for at least the past 10 years. She first noted changes in the right breast with some skin redness. These changes progressed until the nipple was effaced, and she sought medical attention in November after these changes had been present and progressing for more than two months. She finally went to the emergency room on November 13, 2013, with discomfort in the right chest wall. At that time, she had redness of the right arm and developed sepsis with Staphylococcus coagulase-negative with a cellulitis superinfection of the right breast as well as the right upper arm and right lower arm. She underwent a chest CT which showed right axillary and possibly left axillary lymphadenopathy, multiple pulmonary nodules, mediastinal and bilateral hilar lymphadenopathy, and multiple hepatic metastases. She did undergo a breast biopsy on November 14, 2013, which revealed an ER-negative, TX-negative, HER2-amplified breast cancer with the amplification ratio being greater than 5.9. There was no angiolymphatic invasion. The invasive ductal carcinoma was grade 3. No ductal carcinoma in situ was seen on the biopsy. She also was noted to have redness of the right half of her posterior torso. She had a right breast wound which grew heavy growth of beta-hemolytic  Streptococcus group G. She also had right upper extremity edema. On the day of discharge, Venus was feeling relatively well. She had no fevers since admission. She was eating and drinking, her rash improved on empiric antibiotics, and she was discharged on Keflex. The culture of the right breast grew heavy growth beta-hemolytic streptococcus group G. This isolate was presumed to be clindamycin resistant in detection of inducible clindamycin resistance. The organism was susceptible to penicillin as well as ceftriaxone, cefotaxime and ampicillin. The patient has no known drug allergies, and she was discharged on Keflex. She was seen in clinic and begun on the Wilma regimen of pertuzumab, trastuzumab and weekly paclitaxel substituted for docetaxel.    Therapies:  a. Pertuzumab, trastuzumab, weekly paclitaxel.  b. Kadcyla.  C. Capecitabine and trastuzumab.  - Gamma knife to cerebellar lesion 03/22/2016  D. Capecitabine and lapatinib 5-17-16  H. Trastuzumab and eribulin  I. Metronomic cyclophosphamide and methotrexate with trastuzumab  H. Gemcitabine, carboplatin every two weeks with trastuzumab.    INTERVAL HISTORY:  Venus Hyatt returns to clinic.  She is in a wheelchair and is accompanied by her , Colt.  Venus has significant problems with neuropathy and we will increase the gabapentin to 100 mg twice daily.  CT scan of the chest, abdomen and pelvis shows relatively stable disease, and she is tolerating the treatment quite well with a decent quality of life according to Venus and her , Colt.  We will therefore plan on keeping this regimen and go for another    8 weeks or 4 cycles.  She has mild fatigue but is up and around and awake most of the day and she is going to physical therapy 3 times a week, which is a highlight for her.  This is where she gets her exercise.  She has no depression, but moderate anxiety.       REVIEW OF SYSTEMS:  The remainder of a 10-point review of systems is  negative.  She has the neurologic sequelae from her neurosurgery and whole brain radiation as well as gamma knife treatment.  We await restaging on 02/27.  Overall, her performance status is ECOG 1 qualified by her neurologic status.       PHYSICAL EXAMINATION:   VITAL SIGNS:  Blood pressure 111/71, temperature 97.8, pulse 91, respirations 14, O2 sat 99% on room air, weight 43.6 kg.    GENERAL:  Venus appeared reasonably well.  She is very alert and oriented.     HEENT:  She has thinned hair related to her brain radiation.  No lesions in the oropharynx.   LYMPH:  There is no palpable cervical, supraclavicular, subclavicular or axillary lymphadenopathy.   LUNGS:  Clear to percussion and auscultation.   HEART:  There is regular rate and rhythm, S1, S2.   ABDOMEN:  Soft and nontender.   EXTREMITIES:  Without edema.   NEUROLOGIC:  Venus is alert, oriented and making jokes.  She is generally cheerful.  She has a left hemiparesis with the left arm and dense hemiparesis of the left leg.       LABORATORY DATA:  The CMP was within normal limits except for an albumin of 3.1.  CBC showed a WBC of 2.9, hemoglobin 9.1, platelets 123,000, and absolute neutrophil count is 2,100.       IMAGING:  CT scan of the chest, abdomen and pelvis showed slight increase in the size of right breast subcutaneous mass since 11/28/2016.  The more lateral right breast mass is not significantly changed.  There is slight increase in the size of an enhancing lesion in hepatic segment 7 suspicious for metastasis.  Other liver lesions are not significantly changed.  The infiltrative mass in the right latissimus dorsi is not significantly changed.  She has grossly stable sclerotic lesions scattered throughout the visualized skeleton.  No new metastatic disease is identified.  There is a fibroid uterus.       ASSESSMENT AND PLAN:   1. Venus Hyatt is a 76-year-old woman with a history of metastatic, ER-negative, CA-negative, HER2-amplified, right  breast cancer.  She presented in 11/2013 with very extensive metastatic disease involving the bones, liver, lungs, axillary and mediastinal lymph nodes, and a rash across the posterior trunk and down the right arm to the forearm.  She was started on the WILFREDO regimen and had an excellent response.  She had progression and was treated with Kadcyla but then had brain metastases.  The brain metastases were treated with surgery followed by whole brain radiation.  She has had followup gamma knife to recurrent brain metastases.  Most recently she has been on gemcitabine, carboplatin and Herceptin beginning 12/01/2016.  She has tolerated this treatment quite well with a decent quality of life.  Her CT scan of the chest, abdomen and pelvis shows essentially stable disease.  I had a long discussion with Venus and her , Colt.  They would like to continue with treatment.  Overall, Venus's performance status is ECOG 2.  She is awake most of the day, but she does spend most of the day in the chair because of her disability.  She has been taking part in physical therapy.  She has by report no skin breakdown and has been using cushions to help prevent that.     2.  Physical therapy is ongoing.  Venus is enjoying the physical therapy.    3.  Speech.  Venus's speech is somewhat less fluent compared to 1 year ago, but she is clearly able to converse and express her opinions, and she is in favor of continuing with chemotherapy.   4.  Neurosurgery evaluation with Dr. Hipolito Liu will be on 02/27 with brain MRI the same day of the visit.  I will meet with Venus and her  the following Tuesday to discuss whether or not to continue with systemic therapy.  I did discuss hospice with Venus and her , but they both feel that if things are stable they would prefer to continue with the treatment.   5.  Discussion of exercise.  Venus is exercising with physical therapy.   6.  Discussion of diet.  Venus is  working with Colt to try to maintain adequate oral intake.  Her weight has been relatively stable.    7.  Followup.  We will see Venus in followup in our clinic in 2 weeks for gemcitabine, carboplatin and Herceptin.   Follow up with me February 23 with CBC, CMP, CA27.29 and CEA and  gemcitabine/carboplatin/Herceptin.  Follow up with me March 7.         Thank you for allowing us to continue to participate in Venus Hyatt' care.       Ken Mares MD,    [facility]         I spent 40 minutes with the patient more than 50% of which was in counseling and coordination of care.       Again, thank you for allowing me to participate in the care of your patient.      Sincerely,    Ken Mares MD

## 2017-02-09 NOTE — PROGRESS NOTES
Infusion Nursing Note:  Venus Hyatt presents today for Day 1 Cycle 6 Herceptin, Gemzar, Carboplatin and Xgeva.    Patient seen by provider today: Yes: Dr. Mares    Note: N/A.    Intravenous Access:  Implanted Port.    Treatment Conditions:  HGB      9.1   2/9/2017  WBC      2.9   2/9/2017   ANEU      2.1   2/9/2017  PLT      123   2/9/2017     NA      144   2/9/2017                POTASSIUM      3.8   2/9/2017        MAG      2.4   9/28/2016         CR     0.40   2/9/2017                CARLIN      8.7   2/9/2017             BILITOTAL      0.8   2/9/2017        ALBUMIN      3.1   2/9/2017                 ALT       28   2/9/2017        AST       22   2/9/2017  Results reviewed, labs MET treatment parameters, ok to proceed with treatment.  ECHO/MUGA completed 11/3/16  EF 60-65%.    Post Infusion Assessment:  Patient tolerated infusion without incident.  Blood return noted pre and post infusion.  Access discontinued per protocol.    Discharge Plan:   Copy of AVS reviewed with patient and family.  Patient will return 02/23 for next appointment.  Patient discharged in stable condition accompanied by: .  Departure Mode: Wheelchair.    Diana Fernandez, RN

## 2017-02-09 NOTE — NURSING NOTE
Chief Complaint   Patient presents with     Port Draw     Labs Drawn      Port accessed. Labs drawn. Flushed with heparin and NS.    Lauren Schoen, RN

## 2017-02-17 NOTE — IP AVS SNAPSHOT
Venus Hyatt #[id number] (CSN: [id number])  (76 year old F)  (Adm: 17)     [id number]               [address] [place] [address] BANK: [phone]            Patient Demographics     Patient Name Sex          Age SSN Address Phone    Venus Hyatt Female 1940 (76 year old) [id number] [facility] [address] [phone] (Home)      Emergency Contact(s)     Name Relation Home Work Mobile    ALFRED HYATT Spouse [phone]      Admission Information     Attending Provider Admitting Provider Admission Type Admission Date/Time    Mark Long DO Quarles, Jerrod D, MD Emergency 17  1632    Discharge Date Hospital Service Auth/Cert Status Service Area     Oncology [facility]    Unit Room/Bed Admission Status       [facility] 7412/7412-02 Admission (Confirmed)       Admission     Complaint    Acute delirium      Hospital Account     Name Acct ID Class Status Primary Coverage    Venus Hyatt [id number] Inpatient Open MEDICARE - MEDICARE FOR HB SUPPLEMENT            Guarantor Account (for Hospital Account #[id number])     Name Relation to Pt Service Area Active? Acct Type    Venus Hyatt Self FCS Yes Personal/Family    Address Phone          [address] [place] [address] [phone](H)  none(O)              Coverage Information (for Hospital Account #[id number])     1. MEDICARE/MEDICARE FOR HB SUPPLEMENT     F/O Payor/Plan Precert #    MEDICARE/MEDICARE FOR HB SUPPLEMENT     Subscriber Subscriber #    Venus Hyatt [id number]    Address Phone    ATTN CLAIMS  [address]  [facility] [place] [address] [phone]          2. MEDICA/MEDICA PRIME SOLUTION     F/O Payor/Plan Precert #    MEDICA/MEDICA PRIME SOLUTION     Subscriber Subscriber #    Venus Hyatt [id number]    Address Phone    [address] [address]  [place] [address] [phone]                                                       "INTERAGENCY TRANSFER FORM - PHYSICIAN ORDERS   2/17/2017                       [address] [facility] BANK: [phone]            Attending Provider: Mark Long DO     Allergies:  No Known Allergies    Infection:  None   Service:  ONCOLOGY    Ht:  1.588 m (5' 2.5\")   Wt:  49.9 kg (109 lb 14.4 oz)   Admission Wt:  45.9 kg (101 lb 1.6 oz)    BMI:  19.78 kg/m 2   BSA:  1.48 m 2            ED Clinical Impression     Diagnosis Description Comment Added By Time Added    Confusion [R41.0] Confusion [R41.0]  Virgie Pinzon MD 2/17/2017  9:18 PM      Hospital Problems as of 2/23/2017              Priority Class Noted POA    Acute delirium Medium  2/18/2017 Yes      Non-Hospital Problems as of 2/23/2017              Priority Class Noted    SIRS (systemic inflammatory response syndrome) (H)   11/13/2013    Benign essential hypertension Medium  11/21/2013    Bone metastases (H) Medium  11/21/2013    Encounter for long-term current use of medication Medium  5/4/2015    Brain metastases (H) Medium  9/25/2015    Brain tumor (H) Medium  9/29/2015    S/P craniotomy Medium  10/4/2015    Cancer of nipple of right breast (H) Medium  12/19/2015      Code Status History     Date Active Date Inactive Code Status Order ID Comments User Context    2/23/2017 10:32 AM  DNR/DNI [id number]  Leela Miller MD Outpatient    2/23/2017 10:30 AM 2/23/2017 10:32 AM DNR/DNI [id number]  Leela Miller MD Outpatient    2/18/2017  1:31 AM 2/23/2017 10:30 AM DNR/DNI [id number]  Hugh He MD Inpatient    10/22/2015  9:39 AM 2/18/2017  1:31 AM DNR/DNI [id number] I had a discussion with Venus about her condition and she wishes to be DNR, DNI. Ken Mares MD Outpatient    10/19/2015  8:59 AM 10/22/2015  9:39 AM Full Code [id number]  Karine Bowles MD Outpatient    10/4/2015  2:05 PM 10/19/2015  8:59 AM Full Code [id number]  Starr Thomason MD [facility]    10/3/2015 11:46 AM 10/4/2015  2:05 PM Full Code [id number]  " Kathryn Flores MD Outpatient    10/2/2015 12:54 PM 10/3/2015 11:46 AM Full Code [id number]  ChristiansonMelania APRN CNP Outpatient    10/1/2015 10:56 AM 10/2/2015 12:54 PM DNR [id number]  Kathryn Flores MD Inpatient    9/27/2015  6:28 PM 9/29/2015  7:02 AM Full Code During Procedure [id number]  Duane Gimenez MD Inpatient    9/25/2015  7:28 PM 9/27/2015  6:28 PM DNR/DNI [id number]  Dong Landers MD Inpatient    11/13/2013 11:07 PM 11/16/2013  2:21 PM Full Code [id number]  Elisa Palacios MD Inpatient      Current Code Status     Date Active Code Status Order ID Comments User Context       Prior      Summary of Visit     Reason for your hospital stay       You were hospitalized for change in your thinking and weakness. We pursued imaging of your brain which did not show a reason for your confusion. You were found to have a bladder infection and improved with antibiotics.                Medication Review      START taking        Dose / Directions Comments    sulfamethoxazole-trimethoprim 800-160 MG per tablet   Commonly known as:  BACTRIM DS/SEPTRA DS   Indication:  Urinary Tract Infection   Used for:  Urinary tract infection with hematuria, site unspecified        Dose:  1 tablet   Take 1 tablet by mouth 2 times daily for 3 doses   Refills:  0          CONTINUE these medications which may have CHANGED, or have new prescriptions. If we are uncertain of the size of tablets/capsules you have at home, strength may be listed as something that might have changed.        Dose / Directions Comments    acetaminophen 325 MG tablet   Commonly known as:  TYLENOL   This may have changed:    - medication strength  - how to take this  - reasons to take this   Used for:  Bone metastases (H)        Dose:  650 mg   Take 2 tablets (650 mg) by mouth every 6 hours as needed for mild pain or fever   Quantity:  100 tablet   Refills:  0        ascorbic acid 1000 MG Tabs   Commonly known as:  vitamin C   This may have changed:   medication strength   Used for:  Encounter for long-term current use of medication        Dose:  500 mg   Take 0.5 tablets (500 mg) by mouth daily   Quantity:  30 tablet   Refills:  0        docusate sodium 50 MG capsule   Commonly known as:  COLACE   This may have changed:  medication strength   Used for:  Constipation, unspecified constipation type        Dose:  100 mg   Take 2 capsules (100 mg) by mouth 2 times daily as needed for constipation   Quantity:  60 capsule   Refills:  0        Vitamin D (Cholecalciferol) 1000 UNITS Caps   This may have changed:  medication strength   Used for:  Bone metastases (H)        Dose:  2000 Units   Take 2,000 Units by mouth daily   Refills:  0          CONTINUE these medications which have NOT CHANGED        Dose / Directions Comments    calcium carbonate 500 MG tablet   Commonly known as:  OS-CARLIN 500 mg Alutiiq. Ca   Used for:  Bone metastases (H)        Dose:  500 mg   Take 1 tablet (500 mg) by mouth daily   Quantity:  90 tablet   Refills:  0        CENTRUM Tabs   Used for:  Encounter for long-term current use of medication        Dose:  1 tablet   Take 1 tablet by mouth daily   Quantity:  30 tablet   Refills:  0        gabapentin 100 MG capsule   Commonly known as:  NEURONTIN   Used for:  Cancer of nipple of right breast (H), Neuropathy (H)        Dose:  100 mg   Take 1 capsule (100 mg) by mouth 2 times daily   Quantity:  90 capsule   Refills:  3        levETIRAcetam 500 MG tablet   Commonly known as:  KEPPRA   Used for:  Seizure disorder (H)        Dose:  500 mg   Take 1 tablet (500 mg) by mouth 2 times daily   Quantity:  60 tablet   Refills:  1        ondansetron 8 MG tablet   Commonly known as:  ZOFRAN   Used for:  Cancer of nipple of right breast (H), Bone metastases (H)        Dose:  8 mg   Take 1 tablet (8 mg) by mouth every 8 hours as needed (Nausea/Vomiting)   Quantity:  10 tablet   Refills:  2        order for DME   Used for:  Breast cancer, right breast (H), Bone  metastases (H)        Cranial prosthesis   Quantity:  1 Units   Refills:  1        prochlorperazine 10 MG tablet   Commonly known as:  COMPAZINE   Used for:  Chemotherapy induced nausea and vomiting        Dose:  5 mg   Take 0.5 tablets (5 mg) by mouth every 6 hours as needed for nausea or vomiting   Quantity:  20 tablet   Refills:  1          STOP taking     amLODIPine 5 MG tablet   Commonly known as:  NORVASC           diazepam 5 MG tablet   Commonly known as:  VALIUM           heparin lock flush 10 UNIT/ML Soln           LORazepam 0.5 MG tablet   Commonly known as:  ATIVAN                   After Care     Activity - Up with nursing assistance           Advance Diet as Tolerated       Follow this diet upon discharge: Orders Placed This Encounter      Regular Diet Adult       Fall precautions           General info for SNF       Length of Stay Estimate: Short Term Care: Estimated # of Days <30  Condition at Discharge: Improving  Level of care:skilled   Rehabilitation Potential: Good  Admission H&P remains valid and up-to-date: Yes  Recent Chemotherapy: Date:       2/9 - Gemzar, carboplatin and Herceptin every 2 wks    Use Nursing Home Standing Orders: Yes       IV access       Port-a-Cath. De-accessed at discharge, routine cares to be implemented by receiving TCU.             Referrals     Occupational Therapy Adult Consult       Evaluate and treat as clinically indicated.    Reason:  Patient with decreased strength and balance compared to baseline, requires assistance with ADLs       Physical Therapy Adult Consult       Evaluate and treat as clinically indicated.    Reason:  Patient with decreased strength and balance compared to baseline.             Follow-Up Appointment Instructions     Follow Up and recommended labs and tests       Follow up with Nursing home physician. No follow up labs or test are needed.  Follow up with primary care provider Dr Ferguson within 3-4 weeks.  No follow up labs or test are  "needed.  Follow up with specialist, Dr Mares in oncology, in 2 weeks.  No follow up labs or test are needed.             Your next 10 appointments already scheduled     Mar 06, 2017  2:45 PM CST   (Arrive by 2:30 PM)   Return Visit with Bernardo Lui MD   [facility] ([facility])    [address] [place] [address]  [address] [facility] [address]   [phone]            Mar 16, 2017  1:00 PM CDT   (Arrive by 12:45 PM)   Return Visit with Ken Mares MD   [facility] ([facility])    [address] [place] [address]  [address] [facility] [address]   [phone]              Statement of Approval     Ordered          02/23/17 1045  I have reviewed and agree with all the recommendations and orders detailed in this document.  EFFECTIVE NOW     Approved and electronically signed by:  Leela Miller MD                                                 INTERAGENCY TRANSFER FORM - NURSING   2/17/2017                       [address] [facility] BANK: [phone]            Attending Provider: Mark Long DO     Allergies:  No Known Allergies    Infection:  None   Service:  ONCOLOGY    Ht:  1.588 m (5' 2.5\")   Wt:  49.9 kg (109 lb 14.4 oz)   Admission Wt:  45.9 kg (101 lb 1.6 oz)    BMI:  19.78 kg/m 2   BSA:  1.48 m 2            Advance Directives        Does patient have a scanned Advance Directive/ACP document in EPIC?           No        Immunizations     Name Date      Influenza (High Dose) 3 valent vaccine 10/20/16     Influenza (High Dose) 3 valent vaccine 11/10/15     Influenza (IIV3) 10/01/13     Influenza Vaccine IM 3yrs+ 4 Valent IIV4 10/09/14       ASSESSMENT     Discharge Profile Flowsheet     EXPECTED DISCHARGE     COMMUNICATION ASSESSMENT      Expected Discharge Date  02/24/17 (TCU) 02/22/17 1135   Patient's communication style  spoken language (English or Bilingual) 02/18/17 0140    DISCHARGE NEEDS " "ASSESSMENT     Patient's primary language  English 02/19/17 1240    Equipment Currently Used at Home  bath bench;grab bar;orthotic device;raised toilet;wheelchair 02/19/17 1720   SKIN      Transportation Available  family or friend will provide 02/19/17 1720   Inspection  Full 02/23/17 0915    Equipment Used at Home  shower chair 10/05/15 1130   Skin WDL  WDL 02/23/17 0915    GASTROINTESTINAL (ADULT,PEDIATRIC,OB)     SAFETY      GI WDL  ex 02/23/17 0915   Safety WDL  ex;safety factors 02/23/17 0915    Last Bowel Movement  02/20/17 02/21/17 1314   Safety Factors  upper side rails raised x 2, lower side rail raised x 1;bed in low position;wheels locked;call light in reach;ID band on 02/23/17 0915    GI Signs/Symptoms  fecal incontinence 02/23/17 0915                      Assessment WDL (Within Defined Limits) Definitions           Safety WDL     Effective: 09/28/15    Row Information: <b>WDL Definition:</b> Bed in low position, wheels locked; call light in reach; upper side rails up x 2; ID band on<br> <font color=\"gray\"><i>Item=AS safety wdl>>List=AS safety wdl>>Version=F14</i></font>      Skin WDL     Effective: 09/28/15    Row Information: <b>WDL Definition:</b> Warm; dry; intact; elastic; without discoloration; pressure points without redness<br> <font color=\"gray\"><i>Item=AS skin wdl>>List=AS skin wdl>>Version=F14</i></font>      Vitals     Vital Signs Flowsheet     COMMENTS     CLINICALLY ALIGNED PAIN ASSESSMENT (CAPA) ([facility] ADULTS ONLY)      Comments  MRI starting 02/19/17 1910   Comfort  negligible pain 02/22/17 1619    VITAL SIGNS     Change in Pain  getting better 02/22/17 1619    Temp  98.1  F (36.7  C) 02/23/17 0604   Sleep  normal sleep 02/22/17 0146    Temp src  Axillary 02/23/17 0604   HEIGHT AND WEIGHT      Resp  16 02/23/17 0604   Weight  49.9 kg (109 lb 14.4 oz) 02/21/17 1523    Pulse  88 02/23/17 0604   ROSA COMA SCALE      Heart Rate  88 02/22/17 2021   Best Eye Response  " 4-->(E4) spontaneous 02/22/17 0148    Pulse/Heart Rate Source  Monitor 02/22/17 2021   Best Motor Response  6-->(M6) obeys commands 02/22/17 0148    BP  131/72 02/23/17 0604   Best Verbal Response  4-->(V4) confused 02/22/17 0148    BP Location  Right arm 02/23/17 0604   Natasha Coma Scale Score  14 02/22/17 0148    OXYGEN THERAPY     POSITIONING      SpO2  95 % 02/23/17 0604   Body Position  supine, head elevated 02/23/17 0900    O2 Device  None (Room air) 02/23/17 0604   Head of Bed (HOB)  HOB at 20-30 degrees 02/23/17 0900    Oxygen Delivery  4 LPM 02/19/17 1927   Positioning/Transfer Devices  pillows;in use 02/23/17 1002    PAIN/COMFORT     Chair  Recline and up in chair 02/22/17 1619    Patient Currently in Pain  denies 02/23/17 0915   DAILY CARE      Preferred Pain Scale  CAPA (Clinically Aligned Pain Assessment) ([facility] Adults Only) 02/23/17 0915   Activity Level of Assistance  assistance, 2 people 02/23/17 1002    Pain Location  Leg 02/22/17 0146   Activity Type  activity adjusted per tolerance (turn side-to side) 02/23/17 1002    Pain Orientation  Right 02/22/17 0146   ANALGESIA SIDE EFFECTS MONITORING      Pain Descriptors  Aching 02/22/17 0146   Side Effects Monitoring: Respiratory Quality  R 02/22/17 1619    Pain Intervention(s)  Repositioned 02/22/17 0146   Side Effects Monitoring: Respiratory Depth  N 02/22/17 1619    Response to Interventions  Absence of nonverbal indicators of pain 02/23/17 0052   Side Effects Monitoring: Sedation Level  1 02/22/17 1619            Patient Lines/Drains/Airways Status    Active LINES/DRAINS/AIRWAYS     Name: Placement date: Placement time: Site: Days: Last dressing change:    Wound 11/13/13 Right;Upper;Other (Comment) Chest Right Breast   11/13/13      Chest   1198     Wound 10/04/15 Left Toe (Comment  which one) Other (comment) bleeding 10/04/15   1655   Toe (Comment  which one)   507     Rash 11/14/13 1800 Right other (see comments)  11/14/13    1800    1196     Incision/Surgical Site 10/04/15 Right Scalp 10/04/15   1653    507             Patient Lines/Drains/Airways Status    Active PICC/CVC     Name: Placement date: Placement time: Site: Days: Additional Info Last dressing change:    Port A Cath 11/26/13 Left Chest wall 11/26/13   1328   Chest wall   1184 Orientation: Left            Power Port: Yes            Inserted by: MANDIE Wolfe PA-C IR            Lot #: [id number], exp: 2015-08       Vascular Access Port Access/Assessment - single lumen 03/22/16 0608 03/22/16   0608    338 Unsuccessful Insertion Attempts: 0            Access Needle: 22 gauge;3/4 in length;noncoring 90 degree bend       Vascular Access Port Access/Assessment - single lumen 08/23/16 0611 08/23/16   0611    184 Unsuccessful Insertion Attempts: 0            Access Needle: 22 gauge;3/4 in length;noncoring 90 degree bend       Vascular Access Port Access/Assessment - single lumen 09/07/16 0602 09/07/16   0602    169 Unsuccessful Insertion Attempts: 0            Access Needle: 22 gauge;3/4 in length;noncoring 90 degree bend               Intake/Output Detail Report     Date Intake     Output    Shift P.O. I.V. IV Piggyback Total Total       Day 02/22/17 0000 - 02/22/17 0659 -- -- -- -- -- 0    Trisha 02/22/17 0700 - 02/22/17 1459 150 20 -- 170 -- 170    Noc 02/22/17 1500 - 02/22/17 2359 430 -- -- 430 -- 430    Day 02/23/17 0000 - 02/23/17 0659 -- -- -- -- -- 0    Trisha 02/23/17 0700 - 02/23/17 1459 100 -- -- 100 -- 100      Last Void/BM       Most Recent Value    Urine Occurrence 1 at 02/23/2017 0859    Stool Occurrence 1 at 02/20/2017 0932      Case Management/Discharge Planning     Case Management/Discharge Planning Flowsheet     REFERRAL INFORMATION     Equipment Used at Home  shower chair 10/05/15 1130    Arrived From  emergency department 10/02/15 1600   FINAL RESOURCES      LIVING ENVIRONMENT     Equipment Currently Used at Home  bath bench;grab bar;orthotic device;raised toilet;wheelchair  02/19/17 1720    Lives With  spouse 02/19/17 1720   MH/BH CAREGIVER      Living Arrangements  house 02/19/17 1720   Filed Complexity Screen Score  12 02/20/17 1521    COPING/STRESS     ABUSE RISK SCREEN      Major Change/Loss/Stressor  hospitalization;illness;loss of independence;medical condition/diagnosis;significant life changes 02/20/17 1521   QUESTION TO PATIENT:  Has a member of your family or a partner(now or in the past) intimidated, hurt, manipulated, or controlled you in any way?  no 02/17/17 1515    EXPECTED DISCHARGE     QUESTION TO PATIENT: Do you feel safe going back to the place where you are living?  yes 02/18/17 0216    Expected Discharge Date  02/24/17 (TCU) 02/22/17 1135   OBSERVATION: Is there reason to believe there has been maltreatment of a vulnerable adult (ie. Physical/Sexual/Emotional abuse, self neglect, lack of adequate food, shelter, medical care, or financial exploitation)?  no 02/17/17 1515    DISCHARGE PLANNING     (R) MENTAL HEALTH SUICIDE RISK      Transportation Available  family or friend will provide 02/19/17 1720   Are you depressed or being treated for depression?  No 02/18/17 0225                  [address] [facility] BANK: [phone]            Medication Administration Report for Venus Hyatt as of 02/23/17 1051   Legend:    Given Hold Not Given Due Canceled Entry Other Actions    Time Time (Time) Time  Time-Action       Inactive    Active    Linked        Medications 02/17/17 02/18/17 02/19/17 02/20/17 02/21/17 02/22/17 02/23/17    acetaminophen (TYLENOL) tablet 325 mg  Dose: 325 mg Freq: EVERY 4 HOURS PRN Route: PO  PRN Reasons: mild pain,fever  Start: 02/18/17 0131   Admin Instructions: Maximum acetaminophen dose from all sources = 75 mg/kg/day not to exceed 4 grams/day.               docusate sodium (COLACE) capsule 100 mg  Dose: 100 mg Freq: 2 TIMES DAILY PRN Route: PO  PRN Reason: constipation  Start: 02/18/17 0131              gabapentin (NEURONTIN) capsule 100  mg  Dose: 100 mg Freq: 2 TIMES DAILY. Route: PO  Start: 02/18/17 0800     (0902)-Not Given       (1816)-Not Given        (0731)-Not Given       (1624)-Not Given        0744 (100 mg)-Given       1611 (100 mg)-Given        0824 (100 mg)-Given       1700 (100 mg)-Given        0856 (100 mg)-Given       1553 (100 mg)-Given        0910 (100 mg)-Given       [ ] 1600           heparin 100 UNIT/ML injection 5 mL  Dose: 5 mL Freq: EVERY 28 DAYS Route: IK  Start: 02/18/17 1015   Admin Instructions: ONLY to de-access each port in dual implanted port.  Flush with 10 mL NS followed by 5 mL heparin (100 units/mL) at discharge and at least every 28 days.  MAX: 5 mL per port      (1013)-Not Given                heparin lock flush 10 UNIT/ML injection 5-10 mL  Dose: 5-10 mL Freq: EVERY 1 HOUR PRN Route: IK  PRN Reason: other  PRN Comment: to lock each port in dual implanted port.  Start: 02/18/17 1007   Admin Instructions: MAX: 5 mL per port.      1121 (5 mL)-Given                heparin lock flush 10 UNIT/ML injection 5-10 mL  Dose: 5-10 mL Freq: EVERY 24 HOURS Route: IK  Start: 02/18/17 1015   Admin Instructions: To lock each dormant port in dual implanted port.  Check PRN heparin flush order to see when last dose of PRN heparin was given before administering.   MAX: 5 mL per port.      1032 (5 mL)-Given        (1043)-Not Given        0942 (5 mL)-Given        1124 (5 mL)-Given        (1134)-Not Given       1137 (5 mL)-Given        [ ] 1015           levETIRAcetam (KEPPRA) tablet 500 mg  Dose: 500 mg Freq: 2 TIMES DAILY Route: PO  Start: 02/20/17 1100       1129 (500 mg)-Given       1952 (500 mg)-Given        0824 (500 mg)-Given       1951 (500 mg)-Given        0857 (500 mg)-Given       1936 (500 mg)-Given        0910 (500 mg)-Given       [ ] 2000           lidocaine (LMX4) kit  Freq: EVERY 1 HOUR PRN Route: Top  PRN Reason: moderate pain  PRN Comment: with VAD insertion or accessing implanted port  Start: 02/18/17 1007   Admin  "Instructions: Do NOT give if patient has a history of allergy to any local anesthetic or any \"fiona\" product.   Apply 30 minutes prior to VAD insertion or port access.  MAX Dose:  2.5 g (  of 5 g tube)               lidocaine 1 % 1 mL  Dose: 1 mL Freq: EVERY 1 HOUR PRN Route: OTHER  PRN Comment: mild pain with VAD insertion or accessing implanted port  Start: 02/18/17 1007   Admin Instructions: Do NOT give if patient has a history of allergy to any local anesthetic or any \"fiona\" product. MAX dose 1 mL subcutaneous OR intradermal in divided doses.               Medication Instruction  Freq: CONTINUOUS PRN Route: XX  Start: 02/18/17 0131   Admin Instructions: No rectal suppositories if WBC less than 1000/microliter or platelets less than 50,000/microliter.               ondansetron (ZOFRAN) injection 4 mg  Dose: 4 mg Freq: EVERY 6 HOURS PRN Route: IV  PRN Reasons: nausea,vomiting  Start: 02/18/17 0131   Admin Instructions: Irritant.               potassium chloride (KLOR-CON) Packet 20-40 mEq  Dose: 20-40 mEq Freq: EVERY 2 HOURS PRN Route: ORAL OR FEED  PRN Reason: potassium supplementation  Start: 02/19/17 1021   Admin Instructions: Use if unable to tolerate tablets.  If Serum K+ 3.0-3.3, dose = 60 mEq po total dose (40 mEq x1 followed in 2 hours by 20 mEq x1). Recheck K+ level 4 hours after dose and the next AM.  If Serum K+ 2.5-2.9, dose = 80 mEq po total dose (40 mEq Q2H x2). Recheck K+ level 4 hours after dose and the next AM.  If Serum K+ less than 2.5, See IV order.  Dissolve packet contents in 4-8 ounces of cold water or juice.               potassium chloride 10 mEq in 100 mL intermittent infusion  Dose: 10 mEq Freq: EVERY 1 HOUR PRN Route: IV  PRN Reason: potassium supplementation  Start: 02/19/17 1021   Admin Instructions: Infuse via PERIPHERAL LINE or CENTRAL LINE. Use for central line replacement if patient weight less than 65 kg, if patient is on TPN with high potassium content or if unit does not stock " 20 mEq bags.   If Serum K+ 3.0-3.3, dose = 10 mEq/hr x4 doses (40 mEq IV total dose). Recheck K+ level 2 hours after dose and the next AM.   If Serum K+ less than 3.0, dose = 10 mEq/hr x6 doses (60 mEq IV total dose). Recheck K+ level 2 hours after dose and the next AM.               potassium chloride 10 mEq in 100 mL intermittent infusion with 10 mg lidocaine  Dose: 10 mEq Freq: EVERY 1 HOUR PRN Route: IV  PRN Reason: potassium supplementation  Start: 02/19/17 1021   Admin Instructions: Infuse via PERIPHERAL LINE. Use potassium with lidocaine for pain with peripheral administration.  If Serum K+ 3.0-3.3, dose = 10 mEq/hr x4 doses (40 mEq IV total dose). Recheck K+ level 2 hours after dose and the next AM.  If Serum K+ less than 3.0, dose = 10 mEq/hr x6 doses (60 mEq IV total dose). Recheck K+ level 2 hours after dose and the next AM.               potassium chloride 20 mEq in 50 mL intermittent infusion  Dose: 20 mEq Freq: EVERY 1 HOUR PRN Route: IV  PRN Reason: potassium supplementation  Last Dose: 20 mEq (02/19/17 2207)  Start: 02/19/17 1021   Admin Instructions: Infuse via CENTRAL LINE Only. May need EKG if less than 65 kg or on TPN - Max rate is 0.3 mEq/kg/hr for patients not on EKG monitoring.   If Serum K+ 3.0-3.3, dose = 20 mEq/hr x2 doses (40 mEq IV total dose). Recheck K+ level 2 hours after dose and the next AM.  If Serum K+ less than 3.0, dose = 20 mEq/hr x3 doses (60 mEq IV total dose). Recheck K+ level 2 hours after dose and the next AM.       1731 (20 mEq)-New Bag       2207 (20 mEq)-New Bag               potassium chloride SA (K-DUR/KLOR-CON M) CR tablet 20-40 mEq  Dose: 20-40 mEq Freq: EVERY 2 HOURS PRN Route: PO  PRN Reason: potassium supplementation  Start: 02/19/17 1021   Admin Instructions: Use if able to take PO.   If Serum K+ 3.0-3.3, dose = 60 mEq po total dose (40 mEq x1 followed in 2 hours by 20 mEq x1). Recheck K+ level 4 hours after dose and the next AM.  If Serum K+ 2.5-2.9, dose = 80  mEq po total dose (40 mEq Q2H x2). Recheck K+ level 4 hours after dose and the next AM.  If Serum K+ less than 2.5, See IV order.  DO NOT CRUSH.               prochlorperazine (COMPAZINE) injection 5 mg  Dose: 5 mg Freq: EVERY 6 HOURS PRN Route: IV  PRN Reasons: nausea,vomiting  Start: 02/18/17 0131              sodium chloride (PF) 0.9% PF flush 10 mL  Dose: 10 mL Freq: ONCE Route: IV  Start: 02/18/17 1330     (1417)-Not Given [C]                sodium chloride (PF) 0.9% PF flush 10-20 mL  Dose: 10-20 mL Freq: EVERY 1 HOUR PRN Route: IK  PRN Reasons: line flush,post meds or blood draw  Start: 02/18/17 1007   Admin Instructions: And Daily PRN, to de-access each port in dual implanted port.  Flush with 10 mL NS followed by 5 mL heparin (100 units/mL) at discharge and at least every 28 days.      1121 ( )-Given        0801 (20 mL)-Given          0838 (20 mL)-Given [C]            sodium chloride (PF) 0.9% PF flush 10-20 mL  Dose: 10-20 mL Freq: EVERY 1 HOUR PRN Route: IK  PRN Reasons: line flush,post meds or blood draw  PRN Comment: To flush each CVC Implanted port.  Start: 02/18/17 1007   Admin Instructions: 10 mL post IV meds;   20 mL post blood draw.               sodium chloride (PF) 0.9% PF flush 20 mL  Dose: 20 mL Freq: ONCE Route: IK  Start: 02/18/17 0800     (0939)-Not Given                sulfamethoxazole-trimethoprim (BACTRIM DS/SEPTRA DS) 800-160 MG per tablet 1 tablet  Dose: 1 tablet Freq: 2 TIMES DAILY Route: PO  Indications of Use: URINARY TRACT INFECTION  Start: 02/20/17 1000   End: 02/25/17 0759       1128 (1 tablet)-Given       1952 (1 tablet)-Given        0824 (1 tablet)-Given       1951 (1 tablet)-Given        0858 (1 tablet)-Given       1936 (1 tablet)-Given        0910 (1 tablet)-Given       [ ] 2000          Discontinued Medications  Medications 02/17/17 02/18/17 02/19/17 02/20/17 02/21/17 02/22/17 02/23/17         Dose: 25 mcg Freq: EVERY 5 MIN PRN Route: IV  PRN Reason: moderate to severe  pain  Start: 02/19/17 1145   End: 02/20/17 1144   Admin Instructions: Caution: may have synergistic effect when used with midazolam.  If inadequate response, may repeat up to maximum of 100 mcg total dose.   Doses can be exceeded under direct oversight of patient by physician.  Only given for procedural sedation while physician present. Nurse to discontinue this medication when procedure complete.       1900 (25 mcg)-Given       1915 (25 mcg)-Given       1946 (25 mcg)-Given        1144-Med Discontinued            Dose: 0.2 mg Freq: EVERY 1 MIN PRN Route: IV  PRN Reason: benzodiazepine reversal  PRN Comment: over sedation  Start: 02/19/17 1138   End: 02/21/17 1137   Admin Instructions: Give over 15 seconds. If inadequate response after 45 seconds, may repeat up to a MAX total dose of 1 mg. Continue monitoring until discharge criteria met for a minimum of 2 hours.         1137-Med Discontinued           Dose: 500 mg Freq: EVERY 12 HOURS Route: IV  Last Dose: 500 mg (02/19/17 1103)  Start: 02/17/17 2330   End: 02/20/17 1055    2359 (500 mg)-New Bag        0039-Stopped [C]       1055 (500 mg)-New Bag        0003 (500 mg)-New Bag [C]       1103 (500 mg)-New Bag        0040 (500 mg)-New Bag       1055-Med Discontinued            Dose: 0.5 mg Freq: EVERY 4 MIN PRN Route: IV  PRN Reason: sedation  Start: 02/19/17 1145   End: 02/20/17 1144   Admin Instructions: Caution: when used with opioids, may need lower doses.  If inadequate response, may repeat until desired sedative response (Maximum of 3.5 mg total dose). Doses can be exceeded under direct oversight of patient by physician.  Only given for procedural sedation while physician present. Nurse to discontinue this medication when procedure complete.          1921 (0.5 mg)-Given       1950 (0.5 mg)-Given       2013 (0.5 mg)-Given       2029 (0.5 mg)-Given        1144-Med Discontinued            Dose: 0.1-0.4 mg Freq: EVERY 2 MIN PRN Route: IV  PRN Reason: opioid  reversal  Start: 02/19/17 1138   End: 02/21/17 1137   Admin Instructions: For apnea or imminent respiratory arrest: give 0.4 mg IV undiluted Q 2 minutes PRN until desired degree of reversal is obtained, stop opioid and notify provider. Continue monitoring until discharge are criteria met for a minimum of 2 hours.  For severe sedation, decrease in respiratory depth, quality or respiratory rate less than 8: give 0.1 mg IV Q 2 minutes x 3 doses, stop opioid and notify provider.  Try to minimize reversal of analgesia especially in end-of-life patients.  Continue monitoring until discharge criteria are met for a minimum of 2 hours.         1137-Med Discontinued      Medications 02/17/17 02/18/17 02/19/17 02/20/17 02/21/17 02/22/17 02/23/17               INTERAGENCY TRANSFER FORM - NOTES (H&P, Discharge Summary, Consults, Procedures, Therapies)   2/17/2017                       [address] [facility] BANK: [phone]               History & Physicals      H&P by Hugh He MD at 2/17/2017 11:45 PM     Author:  Hugh He MD Service:  Hem/Onc Author Type:  Physician    Filed:  2/18/2017 12:10 AM Date of Service:  2/17/2017 11:45 PM Note Created:  2/17/2017 11:45 PM    Status:  Attested :  Hugh He MD (Physician)    Cosigner:  Mark Long DO at 2/18/2017  3:26 PM        Attestation signed by Mark Long DO at 2/18/2017  3:26 PM        Attestation:  Physician Attestation   I, Mark Long, saw this patient with the resident and agree with the resident s findings and plan of care as documented in the resident s note.      I personally reviewed vital signs, medications, labs and imaging.        Mark Long  Date of Service (when I saw the patient): 02/18/17                               [facility] History and Physical    Venus Hyatt MRN# [id number]   Age: 76 year old YOB: 1940     Date of Admission:  2/17/2017    Home clinic: [place]  [facility]  Primary care provider: Ken Ferguson          Assessment and Plan:   Assessment:   Ms. Venus Hyatt is a 75 yo female patient with metastatic breast cancer with known brain metastasis s/p whole brain radiation and gamma knife radiation in the past now presenting with acute delerium.       Plan:   #. Acute delerium: etiology not clear and the differential may include pharmacologic side effects due the patient's sedative meds, possible infection, or perhaps due to CNS involvement of the patient's malignancy. MRI revealed stable disease however. No clear evidence of infection. Patient not having clear focal deficits that would suggest an acute CVA.  -limiting narcotic sedation  -holding sedative hypnotics  -will continue gabapentin as the patient is on a low dose   -blood and urine cultures ordered   -brain MRA ordered  -may need to consider lumbar puncture w/ cytology, culture, and viral studies   -may need to consider neurology consult to assess for transient seizure activity     #. Metastatic breast cancer: additional treatment at the discretion of the primary oncology team. Patient has known brain metastasis with bone involvement as well.   -will continue keppra, converted to IV formulation.     #. Pancytopenia: suspect 2/2 to primary marrow failure due to the patient's malignancy or 2/2 patient's prior chemotherapy. No need for any transfusion as this time.     #. PPx: SQ heparin    #. Diet: NPO pending swallow evaluation     #. Code Status: DNR/DNI              Chief Complaint:   Confusion     History is obtained from the patient's spouse     Ms. Venus Hyatt is a 75 yo female with metastatic breast cancer (invasive ductal carcinoma of the right breast) with known metastatic disease including brain involvement status whole brain radiation and gamma knife radiation in the past who presented to [facility] ED accompanied by her  with worsening confusion. The patient's history was  "presented by the patient's . When the  woke up this am, he noticed that the patient was essentially non verbal and not following commands. Furthermore, he reported that the patient could not even get out of bed when typically able to get out of bed with assistance. At baseline, the patient is able to converse with her  although has baseline left arm and leg weakness from known metastatic disease to the brain. The patient's  was barely able to have her swallow her pills this am. Given the aforementioned findings, he brought her wife to [facility] ED. In the ED, the  noticed that the patient was a bit more conversant but not \"back to baseline.\" The patient denied any pain.          Cancer Treatment History:   Diagnosed 11/14/2013 with invasive ductal carcinoma of the right breast with metastatic disease ER negative, HI negative, HER2 positive via right breast biopsy. 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   Therapies:  a. Pertuzumab, trastuzumab, weekly paclitaxel.  b. Kadcyla.  C. Capecitabine and trastuzumab.  - Gamma knife to cerebellar lesion 03/22/2016  D. Capecitabine and lapatinib 5-17-16  H. Trastuzumab and eribulin  I. Metronomic cyclophosphamide and methotrexate with trastuzumab  H. Gemcitabine, carboplatin every two weeks with trastuzumab.         Past Medical History:[JQ1.1]     Past Medical History   Diagnosis Date     Cancer (H)      right breast     Hypertension      on meds[JQ1.2]          Past Surgical History:[JQ1.1]      Past Surgical History   Procedure Laterality Date     Appendectomy       Optical tracking system craniotomy, excise tumor, combined Right 9/28/2015     Procedure: COMBINED OPTICAL TRACKING SYSTEM CRANIOTOMY, EXCISE TUMOR;  Surgeon: Gracie Gorman MD;  Location:  OR[JQ1.2]          Social History:[JQ1.1]     Social History     Social History     Marital " status:      Spouse name: Colt     Number of children: 0     Years of education: N/A     Occupational History     med tech      Social History Main Topics     Smoking status: Never Smoker     Smokeless tobacco: Never Used     Alcohol use No     Drug use: No     Sexual activity: Not on file     Other Topics Concern     Not on file     Social History Narrative[JQ1.3]          Family History:[JQ1.1]     Family History   Problem Relation Age of Onset     CANCER Father      prostate, primary brain also     CANCER Cousin      pt does not know type[JQ1.3]          Immunizations:[JQ1.1]     Immunization History   Administered Date(s) Administered     Influenza (High Dose) 3 valent vaccine 11/10/2015, 10/20/2016     Influenza (IIV3) 10/01/2013     Influenza Vaccine IM 3yrs+ 4 Valent IIV4 10/09/2014[JQ1.3]          Allergies:[JQ1.1]    No Known Allergies[JQ1.3]         Medications:[JQ1.1]       No current facility-administered medications on file prior to encounter.   Current Outpatient Prescriptions on File Prior to Encounter:  gabapentin (NEURONTIN) 100 MG capsule Take 1 capsule (100 mg) by mouth 2 times daily   levETIRAcetam (KEPPRA) 500 MG tablet Take 1 tablet (500 mg) by mouth 2 times daily   amLODIPine (NORVASC) 5 MG tablet Take 1 tablet (5 mg) by mouth 2 times daily (Patient taking differently: Take 5 mg by mouth )   DOCUSATE SODIUM PO Take 100 mg by mouth 2 times daily as needed for constipation   calcium carbonate (OS-CARLIN 500 MG [place]. [place]) 500 MG tablet Take 500 mg by mouth daily   Multiple Vitamins-Minerals (CENTRUM PO) Take 1 tablet by mouth daily   Ascorbic Acid (VITAMIN C PO) Take 500 mg by mouth daily    VITAMIN D, CHOLECALCIFEROL, PO Take 2,000 Units by mouth daily    LORazepam (ATIVAN) 0.5 MG tablet Take 1 tablet (0.5 mg) by mouth every 4 hours as needed (Anxiety, Nausea/Vomiting or Sleep)   ondansetron (ZOFRAN) 8 MG tablet Take 1 tablet (8 mg) by mouth every 8 hours as needed (Nausea/Vomiting)    prochlorperazine (COMPAZINE) 10 MG tablet Take 0.5 tablets (5 mg) by mouth every 6 hours as needed for nausea or vomiting   order for DME Cranial prosthesis   diazepam (VALIUM) 5 MG tablet Take one tablet 30 minutes prior to MRI. May repeat times one   heparin Lock Flush 10 UNIT/ML SOLN 5 mLs by Intracatheter route every 24 hours   acetaminophen 650 MG TABS 650 mg by Oral or Feeding Tube route every 6 hours as needed for mild pain[JQ1.3]          Review of Systems:   The Review of Systems is negative other than noted in the HPI[JQ1.1]    Blood pressure 116/77, temperature 99.1  F (37.3  C), temperature source Oral, resp. rate 16, SpO2 100 %, not currently breastfeeding.[JQ1.3]  Gen: NAD, cachectic appearing   HEENT: PERRL, EOMI, MMM, poor dentition   Neck: no posterior/anterior cervical LAD  Lungs: CTAB, no W/R/R  CV: RRR, no M/G/R  Abd: NTND, active bowel sounds x4  Ext: no c/c/e  Neuro: patient thought the year was 2001, could not recognize her  (called him the physical therapist), knew she was in hospital, could follow one step commands, CN II-XII in tact, 0/5 left UE and left LE strength          Data:     ROUTINE LABS (Last four results)  CMP[JQ1.1]  Recent Labs  Lab 02/17/17  1725   *   POTASSIUM 3.8   CHLORIDE 109   CO2 26   ANIONGAP 9   GLC 84   BUN 15   CR 0.39*   GFRESTIMATED >90Non  GFR Calc   GFRESTBLACK >90African American GFR Calc   CARLIN 8.7[JQ1.4]     CBC[JQ1.1]  Recent Labs  Lab 02/17/17  1725   WBC 2.6*   RBC 2.62*   HGB 8.8*   HCT 26.5*   *   MCH 33.6*   MCHC 33.2   RDW 17.0*   PLT 81*[JQ1.4]     INR[JQ1.1]No lab results found in last 7 days.[JQ1.4]  Arterial Blood Gas[JQ1.1]No lab results found in last 7 days.[JQ1.4]    All cardiac studies reviewed by me.  All imaging studies reviewed by me.     Attestation:  I have reviewed today's vital signs, notes, medications, labs and imaging.    Hugh He MD[JQ1.1]        Revision History        User Key  Date/Time User Provider Type Action    > JQ1.4 2/18/2017 12:10 AM Hugh He MD Physician Sign     JQ1.3 2/17/2017 11:52 PM Hugh He MD Physician      JQ1.2 2/17/2017 11:51 PM Hugh He MD Physician      JQ1.1 2/17/2017 11:45 PM Hugh He MD Physician                      Discharge Summaries      Discharge Summaries by Leela Miller MD at 2/23/2017  9:22 AM     Author:  Leela Miller MD Service:  Hem/Onc Author Type:  Resident    Filed:  2/23/2017 10:20 AM Date of Service:  2/23/2017  9:22 AM Note Created:  2/23/2017  9:22 AM    Status:  Cosign Needed :  Leela Miller MD (Resident)    Cosign Required:  Yes             [facility] [place]    Discharge Summary  Heme/Onc    Date of Admission:  2/17/2017  Date of Discharge:[SP1.1]  2/23/2017[SP1.2]  Discharging Provider:[SP1.1] Leela Miller[SP1.3]  Date of Service (when I saw the patient):[SP1.1] 02/23/17[SP1.2]    Discharge Diagnoses[SP1.3]   #Urinary tract infection[SP1.1]     History of Present Illness[SP1.3]   Ms. Venus Hyatt is a 77 yo female with metastatic breast cancer (invasive ductal carcinoma of the right breast) with known metastatic disease including brain involvement status whole brain radiation and gamma knife radiation in the past who presented to [facility] ED accompanied by her  with worsening confusion.     In brief (see H&P 2/17/17 for further details):   When the  woke up this am, he noticed that the patient was essentially non verbal and not following commands and weak compared to baseline. At baseline, the patient is able to converse with her  although has baseline left arm and leg weakness from known metastatic disease to the brain. The patient's  was barely able to have her swallow her pills prior to admission.[SP1.1]     Hospital Course[SP1.3]   Venus Hyatt was admitted on 2/17/2017.  The following problems were addressed  during her hospitalization:    #. Acute change in mental status.  #. Acute UTI with hematuria.   UA with large leukocyte esterase, >182 WBCs with clumps, few bacteria, trace blood; UCx growing out 50-100K E. coli and 10-50K Proteus mirabilis. Mental status is improved since admission. Etiology most likely related to UTI as no evidence of disease progression. MRI revealed stable disease. MRA without evidence of aneurysm, stenosis, or infarct. LP obtained, Cx pending. No PMNs or organisms seen on gram stain. MRI spine did not show evidence of mets in spine. Patient discharged to TCU for further rehabilitation. She will complete a five day course of bactrim DS BID.     #. Possible acute right-sided weakness. Noticed prior to admission. Per Pt's  he is unsure whether she had weakness or if the patient just was unsure of what she was being asked to do given her mental status. MRI and MRA without sign of acute changes.  She does have baseline weakness on left side from known brain mets. Appears to be improving. Patient will discharge to a TCU for further rehabilitation.       #. 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 Known brain metastasis with bone involvement as well. Most recently she is receiving Gemzar, carboplatin and Herceptin every 2 wks (last given on 2/9). Followed by Dr. Mares. She will continue oral Keppra for seizure ppx      #. Pancytopenia. Suspect secondary to primary marrow failure due to the patient's malignancy or 2/2 patient's prior chemotherapy, most recently on 2/9. CSF negative for HSV. HIV is non-reactive. Zinc is low. Counts improving.    #. Deconditioning. Patient was evaluated by physical therapy in the hospital due to decreased strength and balance below her baseline. Will discharge to a TCU for further rehabilitation.     #. Hypertension. Patient previously on amlodipine. This was held on admission and was discontinued at discharge. If she is hypertensive, facility  physician can consider resuming.     Leela Miller  Internal Medicine PGY1  [phone][SP1.1]    Significant Results and Procedures[SP1.3]    Cerebrospinal fluid (2/18)   - Negative for malignancy   - No organisms are identified on GMS stained cytologic specimen.   - Specimen Adequacy: Satisfactory for evaluation.     Urine culture (2/19)  - 50,000 to 100,000 colonies/mL Escherichia coli   - 10,000 to 50,000 colonies/mL Proteus mirabilis     MR brain 2/17  Impression:  1. No significant change since 11/14/2016. Stable multiple supratentorial and infratentorial treated enhancing metastatic lesions. No new metastatic focus.  2. Stable postradiation leukoencephalopathy.    MRA brain 2/18  Impression:  1. Motion degraded exam. Patent major intracranial arteries. No definite aneurysm or stenosis.  2. No acute infarct.    MR spine 2/19  Impression:   1. Motion degraded examination. No definite evidence of metastatic disease in the cervicothoracic or lumbar spine. No cord signal abnormality.  2. Nonenhancing sclerotic osseous foci within the T11 and L5 vertebrae are not significantly changed dating back to at least 1/27/2014 and likely represent treated metastases.  3. Multilevel cervical and lumbar spondylosis and spondylolisthesis. Thoracolumbar scoliosis.  4. Incompletely characterized T2 hyperintense foci within the liver. Please see dedicated CT abdomen/pelvis report 2/8/2017 for further detail intra-abdominal findings.  5. Partially visualized multiple cerebellar treated metastases. Please see dedicated brain MRI report from 2/18/2017 for further detail of intercranial findings.[SP1.1]    Pending Results     Unresulted Labs Ordered in the Past 30 Days of this Admission     Date and Time Order Name Status Description    2/18/2017 1243 Cytology non gyn - GMS stain for PCP, fungus - CSF Tube 4 In process     2/18/2017 1243 Cytology non gyn -CMV inclusion bodies - CSF Tube 4 In process     2/18/2017 0131 Blood  culture Preliminary           Code Status[SP1.3]   DNR / DNI[SP1.1]       Primary Care Physician   Ken Ferguson[SP1.3]    Physical Exam   Temp: 98.1  F (36.7  C) Temp src: Axillary BP: 131/72 Pulse: 88 Heart Rate: 88 Resp: 16 SpO2: 95 % O2 Device: None (Room air)    Vitals:    02/20/17 1300 02/21/17 1517   Weight: 45.9 kg (101 lb 1.6 oz) 49.9 kg (109 lb 14.4 oz)[SP1.4]     Vital Signs with Ranges[SP1.1]  Temp:  [95.9  F (35.5  C)-98.1  F (36.7  C)] 98.1  F (36.7  C)  Pulse:  [79-93] 88  Heart Rate:  [80-88] 88  Resp:  [16] 16  BP: (109-161)/(59-76) 131/72  SpO2:  [94 %-98 %] 95 %  I/O last 3 completed shifts:  In: 600 [P.O.:580; I.V.:20]  Out: -[SP1.4]     Constitutional: Supine in bed, NAD.   Eyes: EOMI. Sclerae non-icteric.   ENT: MMM.   Respiratory: Normal respiratory effort, breathing comfortably on room air. No wheezes/crackles/rhonchi on exam.   Cardiovascular: Regular. Normal S1, S2 without m/g/r.   GI: Soft, nondistended, nontender. Normoactive bowel sounds.   Genitourinary: No suprapubic tenderness.   Skin: No rashes on visualized skin. No jaundice.   Musculoskeletal: Cachetic. Moving all extremities.   Neurologic: Normal speech. Moving all extremities. CN III-XII grossly intact.   Neuropsychiatric: Responds to questions appropriately. Euthymic mood and congruent affect.[SP1.1]     Discharge Disposition[SP1.3]   Discharged to rehabilitation facility  Condition at discharge: Stable[SP1.1]    Consultations This Hospital Stay   PHYSICAL THERAPY ADULT IP CONSULT  OCCUPATIONAL THERAPY ADULT IP CONSULT  SPIRITUAL HEALTH SERVICES IP CONSULT  SOCIAL WORK IP CONSULT  SWALLOW EVAL SPEECH PATH AT BEDSIDE IP CONSULT    Time Spent on this Encounter[SP1.3]   ILeela, personally saw the patient today and spent greater than 30 minutes discharging this patient.[SP1.1]    Discharge Orders   No discharge procedures on file.  Discharge Medications   Current Discharge Medication List      START taking these  medications    Details   sulfamethoxazole-trimethoprim (BACTRIM DS/SEPTRA DS) 800-160 MG per tablet Take 1 tablet by mouth 2 times daily for 3 doses  Refills: 0    Associated Diagnoses: Urinary tract infection with hematuria, site unspecified         CONTINUE these medications which have CHANGED    Details   gabapentin (NEURONTIN) 100 MG capsule Take 1 capsule (100 mg) by mouth 2 times daily  Qty: 90 capsule, Refills: 3    Associated Diagnoses: Cancer of nipple of right breast (H); Neuropathy (H)      levETIRAcetam (KEPPRA) 500 MG tablet Take 1 tablet (500 mg) by mouth 2 times daily  Qty: 60 tablet, Refills: 1    Associated Diagnoses: Seizure disorder (H)      ondansetron (ZOFRAN) 8 MG tablet Take 1 tablet (8 mg) by mouth every 8 hours as needed (Nausea/Vomiting)  Qty: 10 tablet, Refills: 2    Associated Diagnoses: Cancer of nipple of right breast (H); Bone metastases (H)      prochlorperazine (COMPAZINE) 10 MG tablet Take 0.5 tablets (5 mg) by mouth every 6 hours as needed for nausea or vomiting  Qty: 20 tablet, Refills: 1    Associated Diagnoses: Chemotherapy induced nausea and vomiting      docusate sodium (COLACE) 50 MG capsule Take 2 capsules (100 mg) by mouth 2 times daily as needed for constipation  Qty: 60 capsule    Associated Diagnoses: Constipation, unspecified constipation type      Multiple Vitamins-Minerals (CENTRUM) TABS Take 1 tablet by mouth daily  Qty: 30 tablet    Associated Diagnoses: Encounter for long-term current use of medication      ascorbic acid (VITAMIN C) 1000 MG TABS Take 0.5 tablets (500 mg) by mouth daily  Qty: 30 tablet    Associated Diagnoses: Encounter for long-term current use of medication      Vitamin D, Cholecalciferol, 1000 UNITS CAPS Take 2,000 Units by mouth daily    Associated Diagnoses: Bone metastases (H)      acetaminophen (TYLENOL) 325 MG tablet Take 2 tablets (650 mg) by mouth every 6 hours as needed for mild pain or fever  Qty: 100 tablet    Associated Diagnoses: Bone  metastases (H)      calcium carbonate (OS-CALRIN 500 MG Agda[place]ux. 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)      order for DME Cranial prosthesis  Qty: 1 Units, Refills: 1    Associated Diagnoses: Breast cancer, right breast (H); Bone metastases (H)         STOP taking these medications       LORazepam (ATIVAN) 0.5 MG tablet Comments:   Reason for Stopping:         amLODIPine (NORVASC) 5 MG tablet Comments:   Reason for Stopping:         diazepam (VALIUM) 5 MG tablet Comments:   Reason for Stopping:             Allergies   No Known Allergies  Data[SP1.3]   Most Recent 3 CBC's:[SP1.1]  Recent Labs   Lab Test  02/21/17   0837  02/20/17   0315  02/19/17   0808   WBC  2.2*  2.1*  1.8*   HGB  9.0*  7.8*  7.5*   MCV  101*  101*  100   PLT  68*  56*  48*  56*[SP1.4]      Most Recent 3 BMP's:[SP1.1]  Recent Labs   Lab Test  02/21/17   0837  02/20/17   0315  02/19/17   0808   NA  143  144  143   POTASSIUM  3.9  3.7  3.8  3.2*   CHLORIDE  113*  116*  112*   CO2  22  20  24   BUN  4*  3*  5*   CR  0.52  0.34*  0.38*   ANIONGAP  8  8  7   CARLIN  7.9*  7.5*  7.2*   GLC  76  94  88[SP1.4]     Most Recent 2 LFT's:[SP1.1]  Recent Labs   Lab Test  02/09/17   1308  01/26/17   1033   AST  22  19   ALT  28  22   ALKPHOS  68  64   BILITOTAL  0.8  0.9[SP1.4]     Most Recent INR's and Anticoagulation Dosing History:  Anticoagulation Dose History     Recent Dosing and Labs Latest Ref Rng & Units 11/13/2013 9/25/2015 9/27/2015 9/29/2016 2/18/2017    INR 0.86 - 1.14 1.08 0.92 0.97 1.04 1.08        Most Recent 6 Bacteria Isolates From Any Culture (See EPIC Reports for Culture Details):[SP1.1]  Recent Labs   Lab Test  02/19/17   0955  02/18/17   1509   17   0203  10/02/15   1700  10/02/15   1418  10/02/15   1413   CULT  50,000 to 100,000 colonies/mL Escherichia coli  10,000 to 50,000 colonies/mL Proteus mirabilis  *  No growth  No growth after 5 days  >100,000 colonies/mL Escherichia coli*  No growth  No growth[SP1.4]     Most Recent TSH, T4 and A1c Labs:[SP1.1]  Recent Labs   Lab Test  14   0810   TSH  1.17[SP1.4]          Revision History        User Key Date/Time User Provider Type Action    > SP1.2 2017 10:20 AM Leela Miller MD Resident Sign     SP1.4 2017  9:29 AM Leela Miller MD Resident      SP1.3 2017  9:27 AM Leela Miller MD Resident      SP1.1 2017  9:22 AM Leela Miller MD Resident                      Consult Notes      Consults by Amina Kirkland LICSW at 2017  2:27 PM     Author:  Amina Kirkland LICSW Service:  (none) Author Type:      Filed:  2017 11:04 AM Date of Service:  2017  2:27 PM Note Created:  2017  2:17 PM    Status:  Addendum :  Amina Kirkland LICSW ()     Consult Orders:    1. Social Work IP Consult [[id number]] ordered by Mark Long DO at 17 0231                Social Work: Assessment with Discharge Plan    Patient Name:  Venus Hyatt  :  1940  Age:  76 year old  MRN:  [id number]  Risk/Complexity Score:  Filed Complexity Screen Score: 12  Completed assessment with:  Pt's , RN CC, PA    Presenting Information   Reason for Referral:  Discharge plan  Date of Intake:  2017  Referral Source:  Physician  Decision Maker:  pt  Alternate Decision Maker:  No HCD on file, next of kin would be - Colt Duane  Health Care Directive: Pt not appropriate to complete document at present time due to confusion   Living Situation:  House, pt lives in a house with her    Previous Functional Status:   was pt's primary caregiver at home, would assist with all ADLs and  all care needs; per  pt was going to outpatient PT about 3x/week   Patient and family understanding of hospitalization:  Pt's  understands reason for hospitalization   Cultural/Language/Spiritual Considerations:  Pt speaks English, [employer] is listed as Anabaptist   Adjustment to Illness:  Pt's  seems to be coping appropriately     Physical Health  Reason for Admission:[LB1.1]    1. Confusion    2. Malignant neoplasm of right female breast, unspecified site of breast (H)    3. Metastasis to brain (H)    4. Need for prophylactic chemotherapy[LB1.2]      Services Needed/Recommended:  TCU is being recommended by PT    Mental Health/Chemical Dependency  Diagnosis:  None identified   Support/Services in Place:  -  Services Needed/Recommended:  -    Support System  Significant relationship at present time:  Pt's    Family of origin is available for support:  Pt's  was providing care for pt at home prior to admission   Other support available:  None identified   Gaps in support system:  None identified   Patient is caregiver to:  None     Provider Information   Primary Care Physician:  Ken Ferguson   [phone]   Clinic:  [address] / [facility]*      :  [facility] coordinators     Financial   Income Source:  Retired  Financial Concerns:  None identified  Insurance:    Payor/Plan Subscriber Name Rel Member # Group #   MEDICARE - MEDICARE F* INOCENCIO MCDONALD  [id number]       ATTN CLAIMS, [address]   MEDICA - MEDICA PRIME* INOCENCIO MCDONALD  [id number]      [address] [address]       Discharge Plan   Patient and family discharge goal:  EMILIA and RN CC discussed private pay home care vs. TCU vs. LTC;  is currently interested in TCU but understands that LTC is likely going to be needed in the near future and that [facility] would not provide enough care for pt; EMILIA discussed insurance coverage for TCU vs. private pay for LTC[LB1.1]  -EMILIA and RN  CC did explain to pt's  and PA that pt cannot do IV chemo at TCU because the TCU must pay for the cost of this; pt's 's understanding is that chemo will not be done and they will be following up with oncologist outpatient[LB1.3]   Provided education on discharge plan:  YES  Patient agreeable to discharge plan:  YES  A list of Medicare Certified Facilities was provided to the patient and/or family to encourage patient choice. Patient's choices for facility are:  [facility] (ph: [phone], fax: [phone]- referral faxed, they are assessing; SW also provided  with list of TCUs and requested that he select several more options for referrals to be made in case [facility] cannot accept[LB1.1]  Will[LB1.3] NH provide Skilled rehabilitation or complex medical:  YES  General information regarding anticipated insurance coverage and possible out of pocket cost was discussed. Patient and patient's family are aware patient may incur the cost of transportation to the facility, pending insurance payment: YES  Barriers to discharge:  Pt's medical stability, placement       Discharge Recommendations   Anticipated Disposition:  TCU referrals  Transportation Needs:  Medical:  Stretcher- will need medical transport, likely stretcher   Name of Transportation Company and Phone:  HE    [employer] [facility] for [facility]  [phone] or [phone][LB1.1]     Revision History        User Key Date/Time User Provider Type Action    > LB1.3 2/22/2017 11:04 AM Amina Kirkland LICSW  Addend     LB1.2 2/21/2017  2:27 PM Amina Kirkland LICSW  Sign     LB1.1 2/21/2017  2:17 PM Amina Kirkland LICSW                       Progress Notes - Physician (Notes for yesterday and today)      Progress Notes by Zachary Romo MD at 2/22/2017 12:48 PM     Author:  Zachary Romo MD Service:  Hem/Onc Author Type:  Resident    Filed:  2/22/2017  1:08 PM Date of  Service:  2/22/2017 12:48 PM Note Created:  2/22/2017 12:48 PM    Status:  Attested :  Zachary Romo MD (Resident)    Cosigner:  Mark Long DO at 2/22/2017  2:17 PM        Attestation signed by Mark Long DO at 2/22/2017  2:17 PM        Attestation:  Physician Attestation   I, Mark Long, saw this patient with the resident and agree with the resident s findings and plan of care as documented in the resident s note.      I personally reviewed vital signs, medications and labs.    Key findings: Doing ok.  Mental status is improving somewhat, though is still confused at times, but she is conversant at times.  Continue bactrim.  D/C to TCU - probably tomorrow morning.      Mark Long  Date of Service (when I saw the patient): 02/22/17                               Heme Onc Progress Note    Patient name: Venus Hyatt    MRN: [id number]    Admission date: 2/17/2017          Assessment and plan:     Venus Hyatt is a 76 year old female with metastatic breast cancer with known brain metastasis s/p whole brain radiation and gamma knife radiation in the past now presenting with acute change in mental status.       #. Acute change in mental status.  #. Acute UTI with hematuria. UA with large leukocyte esterase, >182 WBCs with clumps, few bacteria, trace blood; UCx growing out 50-100K E. coli and 10-50K Proteus mirabilis   Mental status is improved since admission. Etiology most likely related to UTI as no evidence of disease progression. MRI revealed stable disease however. MRA without evidence of aneurysm, stenosis, or infarct. LP obtained, Cx pending. No PMNs or organisms seen on gram stain. MRI spine does not show evidence of mets in spine.  -limiting narcotic sedation  -holding sedative hypnotics  -will continue gabapentin as the patient is on a low dose   -may need to consider neurology consult to assess for transient seizure activity   -continue Bactrim DS bid for 5  days (x2/21)     #. Possible acute right-sided weakness. Noticed prior to admission. Per Pt's  he is unsure whether she had weakness or if the patient just was unsure of what she was being asked to do given her mental status. MRI and MRA without sign of acute changes. No weakness noted on exam this morning. She does have baseline weakness on left side from known brain mets. Appears to be improving.  -Continue working with PT  -TCU on discharge     #. 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 Known brain metastasis with bone involvement as well. Most recently she is receiving Gemzar, carboplatin and Herceptin every 2 wks (last given on 2/9). Followed by Dr. Mares.  -will continue oral Keppra for seizure ppx      #. Pancytopenia. suspect 2/2 to primary marrow failure due to the patient's malignancy or 2/2 patient's prior chemotherapy, most recently on 2/9. No need for any transfusion at this time. CSF negative for HSV. HIV is non-reactive. Zinc is low. Counts are somewhat improved over last few days.      PPx: hold SQ heparin      FEN:   - Regular diet  - K+ replacement prn      Code Status: DNR/DNI  Disposition: Currently seeking placement at TCU facilities, otherwise medically ready to leave.    Patient discussed with Dr. Long who agrees with assessment and plan.   Zachary Romo MD   PGY4  Heme Onc Fellow        Subjective:   Nursing notes reviewed. Overnight  No significant events. Not eating much. No significant pain. No HA. No nausea, vomiting.    ROS: 4 point ROS including Respiratory, CV, GI and , other than that noted in the HPI, is negative           Objective:   Temp:  [96.5  F (35.8  C)-98.3  F (36.8  C)] 97  F (36.1  C)  Pulse:  [73-94] 73  Heart Rate:  [75-84] 84  Resp:  [16-18] 16  BP: (110-147)/(48-81) 110/59  SpO2:  [94 %-99 %] 94 %  Weights  Vitals reviewed  I/O  I/O last 3 completed shifts:  In: 710 [P.O.:710]  Out: -   General: Frail-appearing woman in no acute distress  HEENT:  MMM. No oral lesions  CV: RRR, normal S1 and S2, no m/r/g  Resp: CTA bilaterally, normal vesicular breath sounds    GI: soft, non-tender, non-distended. Normoactive bs    Skin: no rash on exposed areas skin    Extremities: no edema    Neuro:  alert         Medications:       sulfamethoxazole-trimethoprim  1 tablet Oral BID     levETIRAcetam  500 mg Oral BID     gabapentin  100 mg Oral BID     sodium chloride (PF)  20 mL Intracatheter Once     heparin lock flush  5-10 mL Intracatheter Q24H     heparin  5 mL Intracatheter Q28 Days     sodium chloride (PF)  10 mL Intravenous Once            Labs:   The following labs were reviewed  CMP:    Recent Labs  Lab 02/21/17  0837 02/20/17 0315 02/19/17  0808 02/18/17  0837    144 143 144   POTASSIUM 3.9 3.7  3.8 3.2* 3.9   CHLORIDE 113* 116* 112* 112*   CO2 22 20 24 23   ANIONGAP 8 8 7 9   GLC 76 94 88 123*   BUN 4* 3* 5* 8   CR 0.52 0.34* 0.38* 0.35*   GFRESTIMATED >90Non  GFR Calc >90Non  GFR Calc >90Non  GFR Calc >90Non  GFR Calc   GFRESTBLACK >90African American GFR Calc >90African American GFR Calc >90African American GFR Calc >90African American GFR Calc   CARLIN 7.9* 7.5* 7.2* 7.8*     CBC:    Recent Labs  Lab 02/21/17  0837 02/20/17 0315 02/19/17  0808 02/18/17  0837   WBC 2.2* 2.1* 1.8* 2.3*   RBC 2.73* 2.32* 2.28* 2.49*   HGB 9.0* 7.8* 7.5* 8.4*   HCT 27.6* 23.5* 22.9* 24.9*   * 101* 100 100   MCH 33.0 33.6* 32.9 33.7*   MCHC 32.6 33.2 32.8 33.7   RDW 17.9* 18.0* 17.1* 16.8*   PLT 68* 56*  48* 56* 57*           Studies:   reviewed[MB1.1]       Revision History        User Key Date/Time User Provider Type Action    > MB1.1 2/22/2017  1:08 PM Zachary Romo MD Resident Sign                     Procedure Notes      Procedures by Bert River MD at 2/18/2017  3:14 PM     Author:  Bert River MD Service:  Internal Medicine Author Type:  Physician    Filed:  2/18/2017  3:14 PM  Date of Service:  2/18/2017  3:14 PM Note Created:  2/18/2017  3:10 PM    Status:  Signed :  Bert River MD (Physician)     Procedure Orders:    1. Lumbar Puncture [[id number]] ordered by Bert River MD at 02/18/17 1510                    Hospitalist Procedure Service - Lumbar Puncture Procedure Note  Date of Service:[BT1.1] 02/18/2017[BT1.2]    Patient: Venus Hyatt  MRN: [id number]  Admission Date: 2/17/2017  Hospital Day #[BT1.1] 0[BT1.2]    Reason for procedure: I was asked by Dr. Long to perform a lumbar puncture for altered mental status, assess for infection.    Consent given by: patient's , Colt, who states understanding of the procedure being performed after discussing the risks, benefits and alternatives.    Prior to the start of the procedure and with procedural staff participation, I verbally confirmed the patient s identity using two indicators, relevant allergies, that the procedure was appropriate and matched the consent or emergent situation, and that the correct equipment/implants were available. Immediately prior to starting the procedure I conducted the Time Out with the procedural staff and re-confirmed the patient s name, procedure, and site/side.   Under sterile conditions the patient was positioned L Lateral decubitus with knees drawn up. Betadine solution and sterile drapes were utilized.  Local anesthetic at the site: 3 ml of lidocaine 1% without epinephrine from the LP tray  A 21 G  spinal needle was inserted at the L 3-4 interspace.  Opening Pressurewas not checked.  A total of 7mL of clear and colorless spinal fluid was obtained and sent to the laboratory.   After the needle was removed, a bandaid and pressure were applied and the patient was instructed to stay horizontal until the results were back.    Complications:  None    Patient tolerated the procedure well with no immediate complications.  The primary team was informed of the procedure.     Maurizio  "MD Perico  [facility] Hospitalist  Pager [phone][BT1.1]         Revision History        User Key Date/Time User Provider Type Action    > BT1.2 2/18/2017  3:14 PM Bert River MD Physician Sign     BT1.1 2/18/2017  3:10 PM Bert River MD Physician                   Progress Notes - Therapies (Notes from 02/20/17 through 02/23/17)     No notes of this type exist for this encounter.                                          INTERAGENCY TRANSFER FORM - LAB / IMAGING / EKG / EMG RESULTS   2/17/2017                       [facility] [facility]: [phone]            Unresulted Labs (24h ago through future)    Start       Ordered    02/20/17 0000  Platelet count  (Heparin 5000 units SQ Q8H.  Do not order if PLT less than 50 K.  )  EVERY THREE DAYS,   Routine     Comments:  Every 3 days while on VTE prophylaxis. If no result is listed, this lab has not been done the past 365 days. LATEST LAB RESULT: Platelet Count (10e9/L)       Date                     Value                 02/17/2017               81 (L)           ----------      02/18/17 0131    Unscheduled  Blood culture  (Blood Culture - 2 Sites)  CONDITIONAL (SPECIFY),   Routine     Comments:  Site #1:  If temp is greater than 100.4    May repeat max of once per 24 hrs. (Stat Lab Collect with 1st site collected from Venipuncture and 2nd site from VAD by RN,  if no VAD then 2 peripheral sticks-2 venipuncture from different sites)    02/18/17 0131    Unscheduled  Blood culture  (Blood Culture - 2 Sites)  CONDITIONAL (SPECIFY),   Routine     Comments:  SITE #2: If temp is greater than 100.4    May repeat max of once per 24 hrs. (Stat Lab Collect with 1st site collected from Venipuncture and 2nd site from VAD by RN,  if no VAD then 2 peripheral sticks-2 venipuncture from different sites)    02/18/17 0131    Unscheduled  Potassium  (Potassium Replacement - \"Standard\" - For K levels less than 3.4 mmol/L - UU,UR,UA,RH,SH,PH,WY )  CONDITIONAL (SPECIFY),   " Routine     Comments:  Obtain Potassium Level for these conditions:  *IF no potassium result within 24 hours before initiation of order set, draw potassium level with next lab collect.    *2 HOURS AFTER last IV potassium replacement dose and 4 hours after an oral replacement dose.  *Next morning after potassium dose.     Repeat Potassium Replacement if necessary.    02/19/17 1021         Lab Results - 3 Days      Blood culture [[id number]]  Resulted: 02/23/17 0754, Result status: Preliminary result    Ordering provider: Hugh He MD  02/18/17 0131 Resulting lab: [facility]    Specimen Information    Type Source Collected On   Blood  02/18/17 0203          Components       Value Reference Range Flag Lab   Specimen Description Blood Right Arm   51   Culture Micro No growth after 5 days   225   Micro Report Status Pending   225            CSF Culture Aerobic Bacterial [[id number]]  Resulted: 02/23/17 0708, Result status: Final result    Ordering provider: Zachary Romo MD  02/18/17 1243 Resulting lab: [facility]    Specimen Information    Type Source Collected On   CSF Cerebral spinal fluid 02/18/17 1507          Components       Value Reference Range Flag Lab   Specimen Description Cerebrospinal fluid   51   Culture Micro No growth   225   Micro Report Status FINAL 02/23/2017   225            CBC with platelets differential [[id number]] (Abnormal)  Resulted: 02/21/17 1002, Result status: Final result    Ordering provider: Crys Pierson PA-C  02/21/17 0100 Resulting lab: [facility]    Specimen Information    Type Source Collected On   Blood  02/21/17 0837          Components       Value Reference Range Flag Lab   WBC 2.2 4.0 - 11.0 10e9/L L 51   RBC Count 2.73 3.8 - 5.2 10e12/L L 51   Hemoglobin 9.0 11.7 - 15.7 g/dL L 51   Hematocrit 27.6 35.0 - 47.0 % L 51    78 - 100 fl H 51   MCH 33.0 26.5 - 33.0 pg   51   MCHC 32.6 31.5 - 36.5 g/dL  51   RDW 17.9 10.0 - 15.0 % H 51   Platelet Count 68 150 - 450 10e9/L L 51   Diff Method Automated Method   51   % Neutrophils 63.9 %  51   % Lymphocytes 13.1 %  51   % Monocytes 20.7 %  51   % Eosinophils 1.8 %  51   % Basophils 0.0 %  51   % Immature Granulocytes 0.5 %  51   Nucleated RBCs 1 0 /100 H 51   Absolute Neutrophil 1.4 1.6 - 8.3 10e9/L L 51   Absolute Lymphocytes 0.3 0.8 - 5.3 10e9/L L 51   Absolute Monocytes 0.5 0.0 - 1.3 10e9/L  51   Absolute Eosinophils 0.0 0.0 - 0.7 10e9/L  51   Absolute Basophils 0.0 0.0 - 0.2 10e9/L  51   Abs Immature Granulocytes 0.0 0 - 0.4 10e9/L  51   Absolute Nucleated RBC 0.0   51   Platelet Estimate Confirming automated cell count   51            Basic metabolic panel [[id number]] (Abnormal)  Resulted: 02/21/17 0926, Result status: Final result    Ordering provider: Crys Pierson PA-C  02/21/17 0100 Resulting lab: [facility]    Specimen Information    Type Source Collected On   Blood  02/21/17 0837          Components       Value Reference Range Flag Lab   Sodium 143 133 - 144 mmol/L  51   Potassium 3.9 3.4 - 5.3 mmol/L  51   Chloride 113 94 - 109 mmol/L H 51   Carbon Dioxide 22 20 - 32 mmol/L  51   Anion Gap 8 3 - 14 mmol/L  51   Glucose 76 70 - 99 mg/dL  51   Urea Nitrogen 4 7 - 30 mg/dL L 51   Creatinine 0.52 0.52 - 1.04 mg/dL  51   GFR Estimate -- >60 mL/min/1.7m2  51   Result:         >90  Non  GFR Calc     GFR Estimate If Black -- >60 mL/min/1.7m2  51   Result:         >90   GFR Calc     Calcium 7.9 8.5 - 10.1 mg/dL L 51   Result:              Zinc [[id number]] (Abnormal)  Resulted: 02/20/17 1501, Result status: Final result    Ordering provider: Zachary Romo MD  02/18/17 1246 Resulting lab: [facility]    Specimen Information    Type Source Collected On   Blood  02/19/17 0808          Components       Value Reference Range Flag  Lab   Zinc 53  L 51   Comment:         Reference range: 60 to 120  Unit: ug/dL  (Note)  INTERPRETIVE INFORMATION: Zinc, Serum or Plasma  Circulating zinc concentrations are dependent on albumin  status and are depressed with malnutrition. Zinc may also  be lowered with infection, inflammation, stress, oral  contraceptives, and pregnancy. Zinc may be elevated with  zinc supplementation or fasting. Elevated zinc  concentrations may interfere with copper absorption.  Test developed and characteristics determined by [facility]. See Compliance Statement B: [employer]/  Performed by [facility],  [address] [facility] [place] [address] [phone]  [email or web address], Kermit Brown MD, Lab. Director              Cytology non gyn - Tube 4 [[id number]]  Resulted: 02/20/17 1355, Result status: Final result    Ordering provider: Zachary Romo MD  02/18/17 1243 [facility]    Specimen Information    Type Source Collected On   CSF Cerebral spinal fluid 02/18/17 1507          Components       Value Reference Range Flag Lab   Copath Report --      Result:         Patient Name: INOCENCIO MCDONALD  MR#: [id number]  Specimen #: [id number]  Collected: 2/18/2017  Received: 2/20/2017  Reported: 2/20/2017 13:54  Ordering Phy(s): ZACHARY ROMO  Additional Phy(s): CHRISTIANO MITCHELL    For improved result formatting, select 'View Enhanced Report Format'  under Linked Documents section.    SPECIMEN/STAIN PROCESS:  Cerebrospinal Fluid       Pap-Cyto x 1, Villanueva's stain-cyto x 1, GMS-Cyto x 1    ----------------------------------------------------------------    CYTOLOGIC INTERPRETATION:     Cerebrospinal fluid:  - Negative for malignancy  - No organisms are identified on GMS stained cytologic specimen.  - Specimen Adequacy: Satisfactory for evaluation.    I have personally reviewed all specimens and/or slides, including the  listed special stains, and used them with my medical judgment  to  determine the final diagnosis.    Electronically signed out by:  Jose Dietrich M.D., [facility]    Processed and screened at  [facility]    CLINICAL HISTORY:  The patient is a 77 year old female with a history of metastatic breast  cancer with known brain metastasis ([id number]), status post whole brain  radiation and gamma knife radiation in the past, now presenting with  acute delirium.    ,    GROSS:  Cerebrospinal Fluid:  Received 2 ml of colorless, clear fluid, processed  as 1 Pap stained cytospin and 1 Villanueva stained cytospin. Also processed  for 1 GMS stained cytospin.    MICROSCOPIC:  Microscopic evaluation performed.    Gordon Chan MD, cytology fellow, Jose Dietrich MD    CPT Codes:  A: 55534-SUZ, 01010-KOALLLC, 37234-VQAP    TESTING LAB LOCATION:  [facility], [address]   [address] [phone]    COLLECTION SITE:  Client:  [facility]  [place]  [facility] [place]              HIV Antigen Antibody Combo [[id number]]  Resulted: 02/20/17 1157, Result status: Final result    Ordering provider: Zachary Romo MD  02/18/17 1246 Resulting lab: [facility]    Specimen Information    Type Source Collected On   Blood  02/19/17 0808          Components       Value Reference Range Flag Lab   HIV Antigen Antibody Combo -- NR  51   Result:         Nonreactive   HIV-1 p24 Ag & HIV-1/HIV-2 Ab Not Detected              CBC with platelets differential [[id number]] (Abnormal)  Resulted: 02/20/17 0814, Result status: Final result    Ordering provider: Crys Pierson PA-C  02/20/17 0741 Resulting lab: [facility]    Specimen Information    Type Source Collected On   Blood  02/20/17 0315          Components       Value Reference Range Flag Lab   WBC 2.1 4.0 - 11.0 10e9/L  L 51   RBC Count 2.32 3.8 - 5.2 10e12/L L 51   Hemoglobin 7.8 11.7 - 15.7 g/dL L 51   Hematocrit 23.5 35.0 - 47.0 % L 51    78 - 100 fl H 51   MCH 33.6 26.5 - 33.0 pg H 51   MCHC 33.2 31.5 - 36.5 g/dL  51   RDW 18.0 10.0 - 15.0 % H 51   Platelet Count 56 150 - 450 10e9/L L 51   Diff Method Automated Method   51   % Neutrophils 53.3 %  51   % Lymphocytes 19.6 %  51   % Monocytes 23.8 %  51   % Eosinophils 2.3 %  51   % Basophils 0.5 %  51   % Immature Granulocytes 0.5 %  51   Nucleated RBCs 0 0 /100  51   Absolute Neutrophil 1.1 1.6 - 8.3 10e9/L L 51   Absolute Lymphocytes 0.4 0.8 - 5.3 10e9/L L 51   Absolute Monocytes 0.5 0.0 - 1.3 10e9/L  51   Absolute Eosinophils 0.1 0.0 - 0.7 10e9/L  51   Absolute Basophils 0.0 0.0 - 0.2 10e9/L  51   Abs Immature Granulocytes 0.0 0 - 0.4 10e9/L  51   Absolute Nucleated RBC 0.0   51   Platelet Estimate Confirming automated cell count   51            Basic metabolic panel [[id number]] (Abnormal)  Resulted: 02/20/17 0757, Result status: Final result    Ordering provider: Crys Pierson PA-C  02/20/17 0741 Resulting lab: [facility]    Specimen Information    Type Source Collected On   Blood  02/20/17 8055          Components       Value Reference Range Flag Lab   Sodium 144 133 - 144 mmol/L  51   Potassium 3.7 3.4 - 5.3 mmol/L  51   Chloride 116 94 - 109 mmol/L H 51   Carbon Dioxide 20 20 - 32 mmol/L  51   Anion Gap 8 3 - 14 mmol/L  51   Glucose 94 70 - 99 mg/dL  51   Urea Nitrogen 3 7 - 30 mg/dL L 51   Creatinine 0.34 0.52 - 1.04 mg/dL L 51   GFR Estimate -- >60 mL/min/1.7m2  51   Result:         >90  Non  GFR Calc     GFR Estimate If Black -- >60 mL/min/1.7m2  51   Result:         >90   GFR Calc     Calcium 7.5 8.5 - 10.1 mg/dL L 51   Result:              Potassium [[id number]]  Resulted: 02/20/17 0341, Result status: Final result    Ordering provider: Mark Long DO  02/19/17 7625 Resulting  lab: [facility]    Specimen Information    Type Source Collected On   Blood  02/20/17 0315          Components       Value Reference Range Flag Lab   Potassium 3.8 3.4 - 5.3 mmol/L  51            Platelet count [[id number]] (Abnormal)  Resulted: 02/20/17 0333, Result status: Final result    Ordering provider: Hugh He MD  02/19/17 1800 Resulting lab: [facility]    Specimen Information    Type Source Collected On   Blood  02/20/17 0315          Components       Value Reference Range Flag Lab   Platelet Count 48 150 - 450 10e9/L LL 51   Comment:         This result has been called to DESHAUN LAW by Edel Hamilton on 02 20 2017   at 0331, and has been read back.              Testing Performed By     Lab - Abbreviation Name Director Address Valid Date Range    51 - Unknown [facility] Unknown [address] [facility] [address] 12/31/14 1010 - Present    88 - Unknown [facility] Unknown Unknown 10/30/02 0000 - Present    225 - Unknown [facility] Unknown [address] [facility] [address] 12/19/14 0954 - Present            Encounter-Level Documents:     There are no encounter-level documents.      Order-Level Documents:     There are no order-level documents.

## 2017-02-17 NOTE — ED PROVIDER NOTES
History     Chief Complaint   Patient presents with     Altered Mental Status     The history is provided by the patient, the spouse and medical records. The history is limited by the condition of the patient.     Venus Hyatt is a 76 year old female with a history of metastatic breast cancer with known mets to the brain s/p craniotomy with tumor excision (09/2015), whole brain radiation and gamma knife radiation x2 (last procedure approximately 2 months ago) currently on chemotherapy (last chemo was 10 days ago) who presents to the emergency department today with altered mental status. Per patient's , patient was less verbally responsive this morning and appeared to have increased difficulty with her speech. He also notes that patient had increased difficulty swallowing her medications, though was able to keep down her morning medications but did not take her vitamins. Patient has some left sided weakness at baseline and is wheelchair dependant. She is normally able to transfer with assistance form her  at baseline, however, patient's  reports that patient had increased difficulty transferring this morning. Additionally, he noted some confusion and states patient was unable to remember her birthday while in triage, which he states is abnormal for her. He states patient was at her baseline yesterday. He denies any noted fevers. No recent cough, vomiting or diarrhea. He states patient has not been eating or drinking as much as normal. Patient's  states that they called the [facility] and was instructed to come to the ED for further evaluation.    I have reviewed the Medications, Allergies, Past Medical and Surgical History, and Social History in the [employer] system.    Past Medical History   Diagnosis Date     Cancer (H)      right breast     Hypertension      on meds       Past Surgical History   Procedure Laterality Date     Appendectomy       Optical tracking system  craniotomy, excise tumor, combined Right 9/28/2015     Procedure: COMBINED OPTICAL TRACKING SYSTEM CRANIOTOMY, EXCISE TUMOR;  Surgeon: Gracie Gorman MD;  Location:  OR       Family History   Problem Relation Age of Onset     CANCER Father      prostate, primary brain also     CANCER Cousin      pt does not know type       Social History   Substance Use Topics     Smoking status: Never Smoker     Smokeless tobacco: Never Used     Alcohol use No     Current Facility-Administered Medications   Medication     levETIRAcetam (KEPPRA) 500 mg in NaCl 0.9 % 100 mL intermittent infusion     dextrose 5% and 0.45% NaCl + KCl 20 mEq/L infusion     Current Outpatient Prescriptions   Medication     gabapentin (NEURONTIN) 100 MG capsule     levETIRAcetam (KEPPRA) 500 MG tablet     amLODIPine (NORVASC) 5 MG tablet     DOCUSATE SODIUM PO     calcium carbonate (OS-CARLIN 500 MG [place]. [place]) 500 MG tablet     Multiple Vitamins-Minerals (CENTRUM PO)     Ascorbic Acid (VITAMIN C PO)     VITAMIN D, CHOLECALCIFEROL, PO     LORazepam (ATIVAN) 0.5 MG tablet     ondansetron (ZOFRAN) 8 MG tablet     prochlorperazine (COMPAZINE) 10 MG tablet     order for DME     diazepam (VALIUM) 5 MG tablet     heparin Lock Flush 10 UNIT/ML SOLN     acetaminophen 650 MG TABS      No Known Allergies    Review of Systems   Unable to perform ROS: Mental status change       Physical Exam   BP: 125/68  Heart Rate: 93  Temp: 99.1  F (37.3  C)  Resp: 16  SpO2: 100 %  Physical Exam   Constitutional: No distress.   Thin adult elderly female, sitting back in bed, appears confused, occasionally responsive though not always appropriate responses   HENT:   Head: Atraumatic.   Mouth/Throat: Oropharynx is clear and moist. No oropharyngeal exudate.   Eyes: Pupils are equal, round, and reactive to light. No scleral icterus.   At times she has a slight dysconjugate gaze,   Cardiovascular: Normal rate, regular rhythm, normal heart sounds and intact distal pulses.     Pulmonary/Chest: Breath sounds normal. No respiratory distress.   Abdominal: Soft. Bowel sounds are normal. There is no tenderness.   Musculoskeletal: She exhibits no edema or tenderness.   Neurological: No sensory deficit. GCS eye subscore is 4. GCS verbal subscore is 4. GCS motor subscore is 6.   Awake, attempts to answer questions.  Knows she is in the hospital but cannot state why she is here. Disoriented to date (states it is January 1999).  Unable to name current president.  Correctly names 2 objects, unable to name a third common object (ring). Seems frustrated at times with inability to accurately communicate.     Baseline L sided paralysis.  Able to plantarflex and dorsiflex on the right. Squeezes R hand to command.     CN essentially intact except for slight downturn of corner of mouth on left.     Speech somewhat slurred.    Unable to assess gait or coordination at this time.   Skin: Skin is warm. No rash noted. She is not diaphoretic.   Nursing note and vitals reviewed.      ED Course     ED Course     Procedures   4:41 PM  The patient was seen and examined by Dr. Pinzon in Room ED15.              Critical Care time:  none               Results for orders placed or performed during the hospital encounter of 02/17/17 (from the past 24 hour(s))   Glucose by meter   Result Value Ref Range    Glucose 84 70 - 99 mg/dL   CBC with platelets differential   Result Value Ref Range    WBC 2.6 (L) 4.0 - 11.0 10e9/L    RBC Count 2.62 (L) 3.8 - 5.2 10e12/L    Hemoglobin 8.8 (L) 11.7 - 15.7 g/dL    Hematocrit 26.5 (L) 35.0 - 47.0 %     (H) 78 - 100 fl    MCH 33.6 (H) 26.5 - 33.0 pg    MCHC 33.2 31.5 - 36.5 g/dL    RDW 17.0 (H) 10.0 - 15.0 %    Platelet Count 81 (L) 150 - 450 10e9/L    Diff Method Automated Method     % Neutrophils 68.2 %    % Lymphocytes 15.7 %    % Monocytes 14.2 %    % Eosinophils 1.1 %    % Basophils 0.4 %    % Immature Granulocytes 0.4 %    Nucleated RBCs 0 0 /100    Absolute Neutrophil  1.8 1.6 - 8.3 10e9/L    Absolute Lymphocytes 0.4 (L) 0.8 - 5.3 10e9/L    Absolute Monocytes 0.4 0.0 - 1.3 10e9/L    Absolute Eosinophils 0.0 0.0 - 0.7 10e9/L    Absolute Basophils 0.0 0.0 - 0.2 10e9/L    Abs Immature Granulocytes 0.0 0 - 0.4 10e9/L    Absolute Nucleated RBC 0.0    Basic metabolic panel   Result Value Ref Range    Sodium 145 (H) 133 - 144 mmol/L    Potassium 3.8 3.4 - 5.3 mmol/L    Chloride 109 94 - 109 mmol/L    Carbon Dioxide 26 20 - 32 mmol/L    Anion Gap 9 3 - 14 mmol/L    Glucose 84 70 - 99 mg/dL    Urea Nitrogen 15 7 - 30 mg/dL    Creatinine 0.39 (L) 0.52 - 1.04 mg/dL    GFR Estimate >90  Non  GFR Calc   >60 mL/min/1.7m2    GFR Estimate If Black >90   GFR Calc   >60 mL/min/1.7m2    Calcium 8.7 8.5 - 10.1 mg/dL   MR Brain w/o & w Contrast    Narrative     MR BRAIN W/O & W CONTRAST 2/17/2017 7:31 PM    Provided History: Metastatic breast cancer. Right frontal resection  status post whole brain radiation 9/25/2015.  Gamma knife to right  cerebellar lesion 3/22/2016, 13 lesions on 9/7/2016.     Comparison: Brain MRI 11/14/2016.    Technique: Multiplanar T1-weighted, axial FLAIR, and susceptibility  images were obtained without intravenous contrast. Following  intravenous gadolinium-based contrast administration, axial  T2-weighted, diffusion, and T1-weighted images (in multiple planes)  were obtained.    Contrast: 7.5 mL Gadavist    Findings:  No significant change in multiple enhancing lesions within the  supratentorial and infratentorial brain, compatible with metastatic  disease, since 11/14/2016. Multiple metastases demonstrate associated  hemosiderin deposition, compatible with prior treatment effect.  Metastases demonstrate mild localized mass effect without midline  shift. No new abnormal foci of intracranial enhancement to suggest new  metastatic focus. For example:    Stable 1.7 cm nodular enhancing metastasis within the inferior right  cerebellar hemisphere  (series 20, image 6).  Stable 1.8 cm nodular enhancing hemorrhagic metastasis within the  superior left cerebellar hemisphere (series 20, image 10).  Stable 1.0 cm nodular enhancing metastasis within the right occipital  lobe (series 20, image 11).  Stable 0.9 cm nodular enhancing metastasis within the anterior left  frontal lobe (series 20, image 17).  Stable postsurgical changes of right frontotemporal craniotomy with  underlying resection cavity demonstrating unchanged nodular  enhancement along the inferior and medial margins of the resection  cavity, measuring up to 1.3 cm.    Stable moderate generalized parenchymal volume loss and advanced  confluent bilateral cerebral and cerebellar hemispheric white matter  T2 hyperintensity, likely sequela of prior radiation therapy.  Ventricles are not enlarged out of proportion to the cerebral sulci.  No acute intracranial hemorrhage.    No acute infarct on diffusion-weighted images. Patent major  intracranial vascular flow voids. Scattered paranasal sinus mucosal  thickening and debris, greatest in the sphenoid sinuses. Right mastoid  effusion. Orbits are unremarkable. Normal calvarial marrow signal.      Impression    Impression:  1. No significant change since 11/14/2016. Stable multiple  supratentorial and infratentorial treated enhancing metastatic  lesions. No new metastatic focus.  2. Stable postradiation leukoencephalopathy.    VANESSA RAI MD         Assessments & Plan (with Medical Decision Making)   This is an elderly lady with a history of metastatic breast cancer with known metastases to the brain who presents to the emergency department today with increasing weakness, confusion, and decreased responsiveness.  Differential diagnosis is quite extensive, certainly given her known cancer and known brain metastases we need to be concerned about the possibility of worsening tumor burden and/or swelling.  Bleed is possible.  Stroke is possible.  Some sort of  non-CNS issues such as a metabolic issue could also cause confusion.  INfection, such as UTI, is possible.  She is somewhat difficult to evaluate as she has chronic baseline left sided deficits, she is somewhat minimally interactive and has quite obviously garbled speech.  She is able to correctly identify some items and not others, she is disoriented to date.    Infectious etiology is also possible, she, however, does not have any fever at this point.  Temperature here is  99.1F.    We did establish IV access and we did draw blood for laboratory analysis. Accu-chek is normal at 84. CBC shows pancytopenia with a platelet count of 81,000, white blood count 2.6 and hemoglobin of 8.8. This is slightly worse than before, she is on chemotherapy so some dip values is expected. BMP is essentially within normal limits. We tried multiple times to get a cath UA specimen and this has been unsuccessful. We will continue to give IV fluids.  Patient was given IV fluids here in the emergency department.      Given her known multiple brain metastases, we did do an MRI which was read as follows: No significant change since 11/14/2016 with stable multiple supratentorial and infratentorial enhancing metastatic lesions without any new metastatic focus. Stable postradiation leukoencephalopathy is noted. There is also no evidence of acute infarct on the diffusion weighted images.    It is not clear to me why she has such significant confusion, but in this very debilitated, elderly, metastatic cancer patient I do think it is reasonable to bring her into the hospital.  I spoke to the oncology fellow who is in agreement with the plan.    She was having some issues swallowing this morning per her , so until she can have a formal swallow eval, I think it would be best to give most meds IV.  Ordered keppra IV. Ordered maintenance fluids.  Will proceed with admission.   Still pending UA - will need to be followed up on.    This part of  the document was transcribed by Bernardo Montiel Medical Scribe.   I have reviewed the nursing notes.    I have reviewed the findings, diagnosis, plan and need for follow up with the patient.    New Prescriptions    No medications on file       Final diagnoses:   Confusion   I, Mari Hanson, am serving as a trained medical scribe to document services personally performed by Virgie Pinzon MD, based on the provider's statements to me.      I, Virgie Pinzon MD, was physically present and have reviewed and verified the accuracy of this note documented by Mari Hanson.       2/17/2017   [facility], [place], EMERGENCY DEPARTMENT     Virgie Pinzon MD  02/17/17 7743

## 2017-02-17 NOTE — LETTER
Transition Communication Hand-off for Care Transitions to Next Level of Care Provider    Name: Venus Hyatt  MRN #: [id number]  Primary Care Provider: Ken Ferguson     Primary Clinic: [address] [address]     Reason for Hospitalization:  Confusion [R41.0]  Admit Date/Time: 2/17/2017  4:32 PM  Discharge Date: 2/23/17  Payor Source: Payor: MEDICA / Plan: MEDICA PRIME SOLUTION / Product Type: Indemnity /     Reason for Communication Hand-off Referral: Other Pt discharged from hospital    Discharge Plan: Nader Santoyo [facility], ph: [phone]  -will likely need a LTC placement or private pay home care services after discharge from [facility]      Concern for non-adherence with plan of care:   Y/N N  Discharge Needs Assessment:  Needs       Most Recent Value    Equipment Currently Used at Home bath bench, grab bar, orthotic device, raised toilet, wheelchair    Transportation Available family or friend will provide          Already enrolled in Tele-monitoring program and name of program:  NA  Follow-up specialty is recommended: Yes    Follow-up plan:  Future Appointments  Date Time Provider Department Center   2/23/2017 7:00 PM Aliyah Ying, [facility]   3/6/2017 2:45 PM Bernardo Liu MD [facility]   3/16/2017 1:00 PM Ken Mares MD [facility]       Any outstanding tests or procedures:        Referrals     Future Labs/Procedures    Occupational Therapy Adult Consult     Comments:    Evaluate and treat as clinically indicated.    Reason:  Patient with decreased strength and balance compared to baseline, requires assistance with ADLs    Physical Therapy Adult Consult     Comments:    Evaluate and treat as clinically indicated.    Reason:  Patient with decreased strength and balance compared to baseline.            Key Recommendations:      STIVEN Amado, LICSW  [facility], [address]    AVS/Discharge Summary  is the source of truth; this is a helpful guide for improved communication of patient story

## 2017-02-17 NOTE — IP AVS SNAPSHOT
[address] [address]    Phone:  [phone]                                       After Visit Summary   2/17/2017    Venus Hyatt    MRN: [id number]           After Visit Summary Signature Page     I have received my discharge instructions, and my questions have been answered. I have discussed any challenges I see with this plan with the nurse or doctor.    ..........................................................................................................................................  Patient/Patient Representative Signature      ..........................................................................................................................................  Patient Representative Print Name and Relationship to Patient    ..................................................               ................................................  Date                                            Time    ..........................................................................................................................................  Reviewed by Signature/Title    ...................................................              ..............................................  Date                                                            Time

## 2017-02-17 NOTE — IP AVS SNAPSHOT
MRN:[id number]                      After Visit Summary   2/17/2017    Venus Hyatt    MRN: [id number]           Thank you!     Thank you for choosing [facility] for your care. Our goal is always to provide you with excellent care. Hearing back from our patients is one way we can continue to improve our services. Please take a few minutes to complete the written survey that you may receive in the mail after you visit with us. Thank you!        Patient Information     Date Of Birth          1940        About your hospital stay     You were admitted on:  February 18, 2017 You last received care in the:  Unit [facility]    You were discharged on:  February 23, 2017        Reason for your hospital stay       You were hospitalized for change in your thinking and weakness. We pursued imaging of your brain which did not show a reason for your confusion. You were found to have a bladder infection and improved with antibiotics.                  Who to Call     For medical emergencies, please call 911.  For non-urgent questions about your medical care, please call your primary care provider or clinic, [phone]          Attending Provider     Provider Specialty    Virgie Pinzon MD Emergency Medicine    Colin Sauceda MD Emergency Medicine    Mark Long DO Internal Medicine-Hematology & Oncology       Primary Care Provider Office Phone # Fax #    Ken Ferguson [phone]       [address] [address]        After Care Instructions     Activity - Up with nursing assistance           Advance Diet as Tolerated       Follow this diet upon discharge: Orders Placed This Encounter      Regular Diet Adult            Fall precautions           General info for SNF       Length of Stay Estimate: Short Term Care: Estimated # of Days <30  Condition at Discharge: Improving  Level of care:skilled   Rehabilitation Potential:  Good  Admission H&P remains valid and up-to-date: Yes  Recent Chemotherapy: Date:       2/9 - Gemzar, carboplatin and Herceptin every 2 wks    Use Nursing Home Standing Orders: Yes            IV access       Port-a-Cath. De-accessed at discharge, routine cares to be implemented by receiving TCU.                  Follow-up Appointments     Follow Up and recommended labs and tests       Follow up with Nursing home physician. No follow up labs or test are needed.  Follow up with primary care provider Dr Ferguson within 3-4 weeks.  No follow up labs or test are needed.  Follow up with specialist, Dr Mares in oncology, in 2 weeks.  No follow up labs or test are needed.                  Your next 10 appointments already scheduled     Mar 06, 2017  2:45 PM CST   (Arrive by 2:30 PM)   Return Visit with Bernardo Liu MD   [facility] ([facility])    [address] [place] [address]   [phone]            Mar 16, 2017  1:00 PM CDT   (Arrive by 12:45 PM)   Return Visit with Ken Mares MD   [facility] ([facility])    [address] [place] [address]   [phone]              Additional Services     Occupational Therapy Adult Consult       Evaluate and treat as clinically indicated.    Reason:  Patient with decreased strength and balance compared to baseline, requires assistance with ADLs            Physical Therapy Adult Consult       Evaluate and treat as clinically indicated.    Reason:  Patient with decreased strength and balance compared to baseline.                  Pending Results     Date and Time Order Name Status Description    2/18/2017 1243 Cytology non gyn - GMS stain for PCP, fungus - CSF Tube 4 In process     2/18/2017 1243 Cytology non gyn -CMV inclusion bodies - CSF Tube 4 In process     2/18/2017 0131 Blood culture Preliminary             Statement  of Approval     Ordered          02/23/17 1045  I have reviewed and agree with all the recommendations and orders detailed in this document.  EFFECTIVE NOW     Approved and electronically signed by:  Leela Miller MD             Admission Information     Date & Time Provider Department Dept. Phone    2/17/2017 Mark Long, DO [address] [facility] [facility] [phone]      Your Vitals Were     Blood Pressure Pulse Temperature Respirations Weight Pulse Oximetry    131/72 (BP Location: Right arm) 88 98.1  F (36.7  C) (Axillary) 16 49.9 kg (109 lb 14.4 oz) 95%    BMI (Body Mass Index)                   19.78 kg/m2           MyChart Information     [employer] gives you secure access to your electronic health record. If you see a primary care provider, you can also send messages to your care team and make appointments. If you have questions, please call your primary care clinic.  If you do not have a primary care provider, please call [phone] and they will assist you.        Care EveryWhere ID     This is your Care EveryWhere ID. This could be used by other organizations to access your [facility] medical records  [id number]           Review of your medicines      START taking        Dose / Directions    sulfamethoxazole-trimethoprim 800-160 MG per tablet   Commonly known as:  BACTRIM DS/SEPTRA DS   Indication:  Urinary Tract Infection   Used for:  Urinary tract infection with hematuria, site unspecified        Dose:  1 tablet   Take 1 tablet by mouth 2 times daily for 3 doses   Refills:  0         CONTINUE these medicines which may have CHANGED, or have new prescriptions. If we are uncertain of the size of tablets/capsules you have at home, strength may be listed as something that might have changed.        Dose / Directions    acetaminophen 325 MG tablet   Commonly known as:  TYLENOL   This may have changed:    - medication strength  - how to take this  - reasons to take this   Used for:  Bone metastases (H)         Dose:  650 mg   Take 2 tablets (650 mg) by mouth every 6 hours as needed for mild pain or fever   Quantity:  100 tablet   Refills:  0       ascorbic acid 1000 MG Tabs   Commonly known as:  vitamin C   This may have changed:  medication strength   Used for:  Encounter for long-term current use of medication        Dose:  500 mg   Take 0.5 tablets (500 mg) by mouth daily   Quantity:  30 tablet   Refills:  0       docusate sodium 50 MG capsule   Commonly known as:  COLACE   This may have changed:  medication strength   Used for:  Constipation, unspecified constipation type        Dose:  100 mg   Take 2 capsules (100 mg) by mouth 2 times daily as needed for constipation   Quantity:  60 capsule   Refills:  0       Vitamin D (Cholecalciferol) 1000 UNITS Caps   This may have changed:  medication strength   Used for:  Bone metastases (H)        Dose:  2000 Units   Take 2,000 Units by mouth daily   Refills:  0         CONTINUE these medicines which have NOT CHANGED        Dose / Directions    calcium carbonate 500 MG tablet   Commonly known as:  OS-ACRLIN 500 mg [place]. [place]   Used for:  Bone metastases (H)        Dose:  500 mg   Take 1 tablet (500 mg) by mouth daily   Quantity:  90 tablet   Refills:  0       CENTRUM Tabs   Used for:  Encounter for long-term current use of medication        Dose:  1 tablet   Take 1 tablet by mouth daily   Quantity:  30 tablet   Refills:  0       gabapentin 100 MG capsule   Commonly known as:  NEURONTIN   Used for:  Cancer of nipple of right breast (H), Neuropathy (H)        Dose:  100 mg   Take 1 capsule (100 mg) by mouth 2 times daily   Quantity:  90 capsule   Refills:  3       levETIRAcetam 500 MG tablet   Commonly known as:  KEPPRA   Used for:  Seizure disorder (H)        Dose:  500 mg   Take 1 tablet (500 mg) by mouth 2 times daily   Quantity:  60 tablet   Refills:  1       ondansetron 8 MG tablet   Commonly known as:  ZOFRAN   Used for:  Cancer of nipple of right breast (H), Bone  metastases (H)        Dose:  8 mg   Take 1 tablet (8 mg) by mouth every 8 hours as needed (Nausea/Vomiting)   Quantity:  10 tablet   Refills:  2       order for DME   Used for:  Breast cancer, right breast (H), Bone metastases (H)        Cranial prosthesis   Quantity:  1 Units   Refills:  1       prochlorperazine 10 MG tablet   Commonly known as:  COMPAZINE   Used for:  Chemotherapy induced nausea and vomiting        Dose:  5 mg   Take 0.5 tablets (5 mg) by mouth every 6 hours as needed for nausea or vomiting   Quantity:  20 tablet   Refills:  1         STOP taking     amLODIPine 5 MG tablet   Commonly known as:  NORVASC           diazepam 5 MG tablet   Commonly known as:  VALIUM           heparin lock flush 10 UNIT/ML Soln           LORazepam 0.5 MG tablet   Commonly known as:  ATIVAN                Where to get your medicines      Some of these will need a paper prescription and others can be bought over the counter. Ask your nurse if you have questions.     You don't need a prescription for these medications     acetaminophen 325 MG tablet    ascorbic acid 1000 MG Tabs    calcium carbonate 500 MG tablet    CENTRUM Tabs    docusate sodium 50 MG capsule    gabapentin 100 MG capsule    levETIRAcetam 500 MG tablet    ondansetron 8 MG tablet    prochlorperazine 10 MG tablet    sulfamethoxazole-trimethoprim 800-160 MG per tablet    Vitamin D (Cholecalciferol) 1000 UNITS Caps                Protect others around you: Learn how to safely use, store and throw away your medicines at www.disposemymeds.org.             Medication List: This is a list of all your medications and when to take them. Check marks below indicate your daily home schedule. Keep this list as a reference.      Medications           Morning Afternoon Evening Bedtime As Needed    acetaminophen 325 MG tablet   Commonly known as:  TYLENOL   Take 2 tablets (650 mg) by mouth every 6 hours as needed for mild pain or fever                                 ascorbic acid 1000 MG Tabs   Commonly known as:  vitamin C   Take 0.5 tablets (500 mg) by mouth daily                                calcium carbonate 500 MG tablet   Commonly known as:  OS-CARLIN 500 mg [place]. [place]   Take 1 tablet (500 mg) by mouth daily                                CENTRUM Tabs   Take 1 tablet by mouth daily                                docusate sodium 50 MG capsule   Commonly known as:  COLACE   Take 2 capsules (100 mg) by mouth 2 times daily as needed for constipation                                gabapentin 100 MG capsule   Commonly known as:  NEURONTIN   Take 1 capsule (100 mg) by mouth 2 times daily   Last time this was given:  100 mg on 2/23/2017  9:10 AM                                levETIRAcetam 500 MG tablet   Commonly known as:  KEPPRA   Take 1 tablet (500 mg) by mouth 2 times daily   Last time this was given:  500 mg on 2/23/2017  9:10 AM                                ondansetron 8 MG tablet   Commonly known as:  ZOFRAN   Take 1 tablet (8 mg) by mouth every 8 hours as needed (Nausea/Vomiting)                                order for DME   Cranial prosthesis                                prochlorperazine 10 MG tablet   Commonly known as:  COMPAZINE   Take 0.5 tablets (5 mg) by mouth every 6 hours as needed for nausea or vomiting                                sulfamethoxazole-trimethoprim 800-160 MG per tablet   Commonly known as:  BACTRIM DS/SEPTRA DS   Take 1 tablet by mouth 2 times daily for 3 doses   Last time this was given:  1 tablet on 2/23/2017  9:10 AM                                Vitamin D (Cholecalciferol) 1000 UNITS Caps   Take 2,000 Units by mouth daily

## 2017-02-17 NOTE — IP AVS SNAPSHOT
` ` Patient Information     Patient Name Sex     Venus Hyatt ([id number]) Female 1940       Room Bed    7412 7412-02      Patient Demographics     Address Phone E-mail Address    [address] [address] [phone] (Home) [email or web address]      Patient Ethnicity & Race     Ethnic Group Patient Race    American White      Emergency Contact(s)     Name Relation Home Work Mobile    ALFRED HYATT Spouse [phone]      Documents on File        Status Date Received Description       Documents for the Patient    Consent for Services - Hospital/Clinic Received () 13     Consent for EHR Access Received 13     Privacy Notice - [place] Received 13     Insurance Card Received 13     Patient ID Received 13     [facility] Specified Other Received () 14 bor    Insurance Card Received 13     Consent for Services - [facility] Received 14 imaging center consent    Consent for Services - Hospital/Clinic Received () 14     Consent for Services - [facility]  14 GENERAL CONSENT FORM: SHARED EHR - ENGLISH    Consent for Services - Hospital/Clinic Received () 12/22/15     Consent for Services/Privacy Notice - Hospital/Clinic  () 16 CONSENT FOR SERVICES/PRIVACY NOTICE    HIM ARIANNA Authorization  16     Consent to Communicate Received 05/10/16     HIM ARIANNA Authorization - File Only Received 05/10/16     Physical Therapy Certification Received 05/10/16 5/5/16 to 16    Occupational Therapy Certification Received 16 to 16    External Medication Information Consent       HIM ARIANNA Authorization  10/26/16     HIM ARIANNA Authorization  11/15/16     Consent for Services/Privacy Notice - Hospital/Clinic-Esign       Consent for Services/Privacy Notice - Hospital/Clinic Received 17     External Medication Information Consent  (Deleted)      Consent for Services/Privacy Notice - Hospital/Clinic  Received (Deleted) 01/28/16     Consent for Services/Privacy Notice - Hospital/Clinic-Esign  (Deleted)         Documents for the Encounter    CMS IM for Patient Signature Received 02/17/17     Assessment/Questionnaire  02/22/17 MRI HISTORY QUESTIONNAIRE AND CONTRAST NOTICE      Admission Information     Attending Provider Admitting Provider Admission Type Admission Date/Time    Mark Long DO Quarles, Jerrod D, MD Emergency 02/17/17  1632    Discharge Date Hospital Service Auth/Cert Status Service Area     Oncology Incomplete [facility]    Unit Room/Bed Admission Status       [facility]12[facility]12-[facility] Admission (Confirmed)       Admission     Complaint    Acute delirium      Hospital Account     Name Acct ID Class Status Primary Coverage    Venus Hyatt [id number] Inpatient Open MEDICARE - MEDICARE FOR HB SUPPLEMENT            Guarantor Account (for Hospital Account #[id number])     Name Relation to Pt Service Area Active? Acct Type    Venus Hyatt Self FCS Yes Personal/Family    Address Phone          [address] [place] [address] [phone](H)  none(O)              Coverage Information (for Hospital Account #[id number])     1. MEDICARE/MEDICARE FOR HB SUPPLEMENT     F/O Payor/Plan Precert #    MEDICARE/MEDICARE FOR HB SUPPLEMENT     Subscriber Subscriber #    Venus Hyatt [id number]    Address Phone    ATTN CLAIMS  [address]  [place] [address] [phone]          2. MEDICA/MEDICA PRIME SOLUTION     F/O Payor/Plan Precert #    MEDICA/MEDICA PRIME SOLUTION     Subscriber Subscriber #    Venus Hyatt [id number]    Address Phone    [address] [address]  [place] [address] [phone]

## 2017-02-17 NOTE — ED NOTES
Pt has a history of breast cancer with mets to the brain and is currently on chemotherapy infusions every 2 weeks. Per  today pt is less verbally responsive and having more difficulty with swallowing medications today. He reports she has generalized weakness and altered mental status. During triage pt is alert, minimally verbal, unable to assess cognition at this time due to pt only answering yes/no questions. Pt unable to give birthday or answer other simple questions.

## 2017-02-18 PROBLEM — R41.0 ACUTE DELIRIUM: Status: ACTIVE | Noted: 2017-01-01

## 2017-02-18 NOTE — PROGRESS NOTES
[facility] On[facility] Progress Note    Patient name: Venus Hyatt    MRN: [id number]    Admission date: 2/17/2017          Assessment and plan:     Ms. Venus Hyatt is a 75 yo female patient with metastatic breast cancer with known brain metastasis s/p whole brain radiation and gamma knife radiation in the past now presenting with acute delerium.     #. Acute delerium:   #. Right-sided weakness  Improved. etiology not clear and the differential may include pharmacologic side effects due the patient's sedative meds, possible infection, or perhaps due to CNS involvement of the patient's malignancy. MRI revealed stable disease however. No clear evidence of infection. Patient not having clear focal deficits that would suggest an acute CVA.  -limiting narcotic sedation  -holding sedative hypnotics  -will continue gabapentin as the patient is on a low dose   - cultures in process  -brain MRA ordered.  Likely low yield given MRI negative for stroke.  -will try to get lumbar puncture w/ cytology and viral studies.  History not suggestive of bacterial infection and improvement without antibiotics  - ordered MRI spine to evaluate for leptomeningeal disease.  Unlikely she'll tolerated full MRI.  We'll have to do this in parts.  This is not urgent  -may need to consider neurology consult to assess for transient seizure activity      #. Metastatic breast cancer: additional treatment at the discretion of the primary oncology team. Patient has known brain metastasis with bone involvement as well.   -will continue keppra, converted to IV formulation.  Currently on board for seizure prophylaxis     #. Pancytopenia: suspect 2/2 to primary marrow failure due to the patient's malignancy or 2/2 patient's prior chemotherapy. No need for any transfusion as this time.   -We'll check zinc and HIV to evaluate for lymphopenia.  Checking viral etiologies with LP.      #. PPx: hold SQ heparin     #. Diet: NPO pending swallow evaluation      #. Code  Status: DNR/DNI     Disposition:  Unclear at this time  Patient discussed with Dr. Long who agrees with assessment and plan.   Zachary Romo MD   PGY4  [facility] Onc Fellow        Subjective:   Nursing notes reviewed. Overnight  No significant events.  Most of history per .  He notes she is improved with regards to her mental status today and being more alert.  She has been on seizure meds appears related to her brain metastases and gamma knife.  No recent seizure activity observed.  She was not quite is alert yesterday and wasn't quite responding to commands or understanding things as per usual.  He was questioned whether or not she had trouble swallowing.  He was wondering if this was related to her not understanding command at the time.  No infectious symptoms recently    ROS: 4 point ROS including Respiratory, CV, GI and , other than that noted in the HPI, is negative           Objective:   Temp:  [95.6  F (35.3  C)-99.1  F (37.3  C)] 97.2  F (36.2  C)  Pulse:  [82] 82  Heart Rate:  [75-93] 75  Resp:  [16] 16  BP: (115-125)/(43-77) 120/64  SpO2:  [91 %-100 %] 99 %  Weights  Vitals reviewed  I/O     General: Frail-appearing woman in no acute distress  HEENT: MMM. No oral lesions  CV: RRR, normal S1 and S2, no m/r/g  Resp: CTA bilaterally, normal vesicular breath sounds    GI: soft, non-tender, non-distended. Normoactive bs    Skin: no rash on exposed areas skin    Extremities: no edema    Neuro:  Able to respond to some commands.  Sometimes has short episodes of what seemed to be staring off into space but they're very self-limited and cheese redirectable during these.  No tremors.  Extraocular movements intact.  Tongue movements intact.  Pupils equally round and round and reactive to light. Appeared to have some nuchal rigidity.  No pain with this.  Unclear if this is her baseline         Medications:       gabapentin  100 mg Oral BID     heparin  5,000 Units Subcutaneous Q8H     sodium chloride (PF)  20  mL Intracatheter Once     heparin lock flush  5-10 mL Intracatheter Q24H     heparin  5 mL Intracatheter Q28 Days     levETIRAcetam  500 mg Intravenous Q12H            Labs:   The following labs were reviewed  CMP:  Recent Labs  Lab 02/18/17  0837 02/17/17  1725    145*   POTASSIUM 3.9 3.8   CHLORIDE 112* 109   CO2 23 26   ANIONGAP 9 9   * 84   BUN 8 15   CR 0.35* 0.39*   GFRESTIMATED >90Non  GFR Calc >90Non  GFR Calc   GFRESTBLACK >90African American GFR Calc >90African American GFR Calc   CARLIN 7.8* 8.7     CBC:  Recent Labs  Lab 02/18/17  0837 02/17/17  1725   WBC 2.3* 2.6*   RBC 2.49* 2.62*   HGB 8.4* 8.8*   HCT 24.9* 26.5*    101*   MCH 33.7* 33.6*   MCHC 33.7 33.2   RDW 16.8* 17.0*   PLT 57* 81*           Studies:   reviewed

## 2017-02-18 NOTE — PLAN OF CARE
Problem: Goal Outcome Summary  Goal: Goal Outcome Summary  Outcome: Improving  BPs soft, OVSS. Disorientated to time;place;situation. Speech slurred and incoherent at times. Denies pain. Denies nausea. Port hep locked. Blood return noted. Incontinent of stool and urine. Unable to get UA. Repro q2h for skin. Left sided weakness. LUE contracted inward. Baseline pt is able to pivot to commode. Today pt has not gotten out of bed. Strict NPO. Needs a dysphagia screen before any oral intake.      Pt will be having a lumbar puncture at the bedside around 1400. This RN witnessed consent.      WBC 2.3 PLT 57- heparin d/c'ed      PLAN: collect UA, repro q2h, watch mental status, incontinence care

## 2017-02-18 NOTE — PROCEDURES
Hospitalist Procedure Service - Lumbar Puncture Procedure Note  Date of Service: 02/18/2017    Patient: Venus Hyatt  MRN: [id number]  Admission Date: 2/17/2017  Hospital Day # 0    Reason for procedure: I was asked by Dr. Long to perform a lumbar puncture for altered mental status, assess for infection.    Consent given by: patient's , Colt, who states understanding of the procedure being performed after discussing the risks, benefits and alternatives.    Prior to the start of the procedure and with procedural staff participation, I verbally confirmed the patient s identity using two indicators, relevant allergies, that the procedure was appropriate and matched the consent or emergent situation, and that the correct equipment/implants were available. Immediately prior to starting the procedure I conducted the Time Out with the procedural staff and re-confirmed the patient s name, procedure, and site/side.   Under sterile conditions the patient was positioned L Lateral decubitus with knees drawn up. Betadine solution and sterile drapes were utilized.  Local anesthetic at the site: 3 ml of lidocaine 1% without epinephrine from the LP tray  A 21 G  spinal needle was inserted at the L 3-4 interspace.  Opening Pressurewas not checked.  A total of 7mL of clear and colorless spinal fluid was obtained and sent to the laboratory.   After the needle was removed, a bandaid and pressure were applied and the patient was instructed to stay horizontal until the results were back.    Complications:  None    Patient tolerated the procedure well with no immediate complications.  The primary team was informed of the procedure.     Maurizio River MD  Med-Peds Hospitalist  Pager [phone]

## 2017-02-18 NOTE — PLAN OF CARE
Problem: Goal Outcome Summary  Goal: Goal Outcome Summary  Outcome: No Change  Pt arrived around 01 accompanied by spouse. Pt disoriented. Denied pain and nausea. Admission profile done with the help of spouse. Skin looked intact. Has left arm contracture. Incontinent of urine x 2, perineal care done and wearing attend. Bed alarm on and spouse at bedside. Port-a-cath did not have blood return but flushed well. May need TPA.  ED was called for report and writer was put on hold. Writer waited and had to receive report about other patients from the outgoing RN's and could not hold any longer. Apparently ED called back when writer was busy with a pt and were unable to give report and eventually called the nursing supervisor.

## 2017-02-18 NOTE — PROGRESS NOTES
SPIRITUAL HEALTH SERVICES  SPIRITUAL ASSESSMENT Progress Note  [id number]   ON-CALL VISIT    REFERRAL SOURCE: [facility] consult for emotional support.    Outcome:  Venus was awake and her  was present during our visit.  Today I read Psalms 42 and 121 for comfort and emotional/spiritual support for Venus and .  Reassured Venus that God loves her unconditionally and is holding her in his arms.      PLAN: [facility] will be available for Venus and  for her duration of stay.    Levi Torres  Chaplain Resident  Pager [phone]

## 2017-02-18 NOTE — H&P
[facility] History and Physical    Venus Hyatt MRN# [id number]   Age: 76 year old YOB: 1940     Date of Admission:  2/17/2017    Home clinic: [facility]  Primary care provider: Ken Ferguson          Assessment and Plan:   Assessment:   Ms. Venus Hyatt is a 75 yo female patient with metastatic breast cancer with known brain metastasis s/p whole brain radiation and gamma knife radiation in the past now presenting with acute delerium.       Plan:   #. Acute delerium: etiology not clear and the differential may include pharmacologic side effects due the patient's sedative meds, possible infection, or perhaps due to CNS involvement of the patient's malignancy. MRI revealed stable disease however. No clear evidence of infection. Patient not having clear focal deficits that would suggest an acute CVA.  -limiting narcotic sedation  -holding sedative hypnotics  -will continue gabapentin as the patient is on a low dose   -blood and urine cultures ordered   -brain MRA ordered  -may need to consider lumbar puncture w/ cytology, culture, and viral studies   -may need to consider neurology consult to assess for transient seizure activity     #. Metastatic breast cancer: additional treatment at the discretion of the primary oncology team. Patient has known brain metastasis with bone involvement as well.   -will continue keppra, converted to IV formulation.     #. Pancytopenia: suspect 2/2 to primary marrow failure due to the patient's malignancy or 2/2 patient's prior chemotherapy. No need for any transfusion as this time.     #. PPx: SQ heparin    #. Diet: NPO pending swallow evaluation     #. Code Status: DNR/DNI              Chief Complaint:   Confusion     History is obtained from the patient's spouse     Ms. Venus Hyatt is a 75 yo female with metastatic breast cancer (invasive ductal carcinoma of the right breast) with known metastatic disease including brain  "involvement status whole brain radiation and gamma knife radiation in the past who presented to [facility] ED accompanied by her  with worsening confusion. The patient's history was presented by the patient's . When the  woke up this am, he noticed that the patient was essentially non verbal and not following commands. Furthermore, he reported that the patient could not even get out of bed when typically able to get out of bed with assistance. At baseline, the patient is able to converse with her  although has baseline left arm and leg weakness from known metastatic disease to the brain. The patient's  was barely able to have her swallow her pills this am. Given the aforementioned findings, he brought her wife to [facility] ED. In the ED, the  noticed that the patient was a bit more conversant but not \"back to baseline.\" The patient denied any pain.          Cancer Treatment History:   Diagnosed 11/14/2013 with invasive ductal carcinoma of the right breast with metastatic disease ER negative, GA negative, HER2 positive via right breast biopsy. 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   Therapies:  a. Pertuzumab, trastuzumab, weekly paclitaxel.  b. Kadcyla.  C. Capecitabine and trastuzumab.  - Gamma knife to cerebellar lesion 03/22/2016  D. Capecitabine and lapatinib 5-17-16  H. Trastuzumab and eribulin  I. Metronomic cyclophosphamide and methotrexate with trastuzumab  H. Gemcitabine, carboplatin every two weeks with trastuzumab.         Past Medical History:     Past Medical History   Diagnosis Date     Cancer (H)      right breast     Hypertension      on meds          Past Surgical History:      Past Surgical History   Procedure Laterality Date     Appendectomy       Optical tracking system craniotomy, excise tumor, combined Right 9/28/2015     Procedure: COMBINED OPTICAL TRACKING SYSTEM CRANIOTOMY, EXCISE " TUMOR;  Surgeon: Gracie Gorman MD;  Location: [facility] OR          Social History:     Social History     Social History     Marital status:      Spouse name: Colt     Number of children: 0     Years of education: N/A     Occupational History     med tech      Social History Main Topics     Smoking status: Never Smoker     Smokeless tobacco: Never Used     Alcohol use No     Drug use: No     Sexual activity: Not on file     Other Topics Concern     Not on file     Social History Narrative          Family History:     Family History   Problem Relation Age of Onset     CANCER Father      prostate, primary brain also     CANCER Cousin      pt does not know type          Immunizations:     Immunization History   Administered Date(s) Administered     Influenza (High Dose) 3 valent vaccine 11/10/2015, 10/20/2016     Influenza (IIV3) 10/01/2013     Influenza Vaccine IM 3yrs+ 4 Valent IIV4 10/09/2014          Allergies:    No Known Allergies         Medications:       No current facility-administered medications on file prior to encounter.   Current Outpatient Prescriptions on File Prior to Encounter:  gabapentin (NEURONTIN) 100 MG capsule Take 1 capsule (100 mg) by mouth 2 times daily   levETIRAcetam (KEPPRA) 500 MG tablet Take 1 tablet (500 mg) by mouth 2 times daily   amLODIPine (NORVASC) 5 MG tablet Take 1 tablet (5 mg) by mouth 2 times daily (Patient taking differently: Take 5 mg by mouth )   DOCUSATE SODIUM PO Take 100 mg by mouth 2 times daily as needed for constipation   calcium carbonate (OS-CARLIN 500 MG [place]. CA) 500 MG tablet Take 500 mg by mouth daily   Multiple Vitamins-Minerals (CENTRUM PO) Take 1 tablet by mouth daily   Ascorbic Acid (VITAMIN C PO) Take 500 mg by mouth daily    VITAMIN D, CHOLECALCIFEROL, PO Take 2,000 Units by mouth daily    LORazepam (ATIVAN) 0.5 MG tablet Take 1 tablet (0.5 mg) by mouth every 4 hours as needed (Anxiety, Nausea/Vomiting or Sleep)   ondansetron (ZOFRAN) 8 MG  tablet Take 1 tablet (8 mg) by mouth every 8 hours as needed (Nausea/Vomiting)   prochlorperazine (COMPAZINE) 10 MG tablet Take 0.5 tablets (5 mg) by mouth every 6 hours as needed for nausea or vomiting   order for DME Cranial prosthesis   diazepam (VALIUM) 5 MG tablet Take one tablet 30 minutes prior to MRI. May repeat times one   heparin Lock Flush 10 UNIT/ML SOLN 5 mLs by Intracatheter route every 24 hours   acetaminophen 650 MG TABS 650 mg by Oral or Feeding Tube route every 6 hours as needed for mild pain          Review of Systems:   The Review of Systems is negative other than noted in the HPI    Blood pressure 116/77, temperature 99.1  F (37.3  C), temperature source Oral, resp. rate 16, SpO2 100 %, not currently breastfeeding.  Gen: NAD, cachectic appearing   HEENT: PERRL, EOMI, MMM, poor dentition   Neck: no posterior/anterior cervical LAD  Lungs: CTAB, no W/R/R  CV: RRR, no M/G/R  Abd: NTND, active bowel sounds x4  Ext: no c/c/e  Neuro: patient thought the year was 2001, could not recognize her  (called him the physical therapist), knew she was in hospital, could follow one step commands, CN II-XII in tact, 0/5 left UE and left LE strength          Data:     ROUTINE LABS (Last four results)  CMP  Recent Labs  Lab 02/17/17  1725   *   POTASSIUM 3.8   CHLORIDE 109   CO2 26   ANIONGAP 9   GLC 84   BUN 15   CR 0.39*   GFRESTIMATED >90Non  GFR Calc   GFRESTBLACK >90African American GFR Calc   CARLIN 8.7     CBC  Recent Labs  Lab 02/17/17  1725   WBC 2.6*   RBC 2.62*   HGB 8.8*   HCT 26.5*   *   MCH 33.6*   MCHC 33.2   RDW 17.0*   PLT 81*     INRNo lab results found in last 7 days.  Arterial Blood GasNo lab results found in last 7 days.    All cardiac studies reviewed by me.  All imaging studies reviewed by me.     Attestation:  I have reviewed today's vital signs, notes, medications, labs and imaging.    Hugh He MD

## 2017-02-19 NOTE — PROGRESS NOTES
02/19/17 1432   Quick Adds   Type of Visit Initial PT Evaluation   Living Environment   Lives With spouse   Living Arrangements house   Home Accessibility stairs to enter home;bed and bath on same level   Number of Stairs to Enter Home 4  (from garage - 2 from front door)   Number of Stairs Within Home 4  (pt does not need to access 2nd level)   Transportation Available family or friend will provide   Living Environment Comment Pt lives in house with .  has made a temporary ramp to enter home so pt does not need to ascess stairs. Bed/bath on main level that pt accesses.    Self-Care   Dominant Hand right   Usual Activity Tolerance fair   Current Activity Tolerance poor   Regular Exercise yes   Activity/Exercise Type other (see comments)  (PT 3x/week)   Exercise Amount/Frequency 30 mins;45 mins;3-5 times/wk   Equipment Currently Used at Home bath bench;grab bar;orthotic device;raised toilet;wheelchair   Activity/Exercise/Self-Care Comment Pt requires assistance for bathing, dressing, and harsha cares at baseline.  assists with many of these cares. A NST comes ~1x/week for a few hours to assist with bathing tasks or if  needs to leave for a meeting.    Functional Level Prior   Ambulation 3-->assistive equipment and person   Transferring 3-->assistive equipment and person   Toileting 3-->assistive equipment and person   Bathing 3-->assistive equipment and person   Dressing 2-->assistive person   Eating 2-->assistive person   Communication 2-->difficulty understanding (not related to language barrier)   Cognition 2 - difficulty with organizing thoughts   Fall history within last six months yes   Number of times patient has fallen within last six months 2   Which of the above functional risks had a recent onset or change? transferring;toileting;fall history;cognition   Prior Functional Level Comment Pt recieves assistance from  for all of her ADLs including: about mod A for bed mobility,  min A for sit <> stand, min A for standing pivot transfers. Pt  pt was able to stand within FWW or with arm in arm while he performed harsha-cares. Pt is not ambulatory at this time per , but has been working on small steps with OP PT.    General Information   Onset of Illness/Injury or Date of Surgery - Date 02/18/17  (date of PT orders)   Referring Physician Hugh He MD   Patient/Family Goals Statement To return to home if safe and even improve function - per    Pertinent History of Current Problem (include personal factors and/or comorbidities that impact the POC) Pt is a 77 yo female patient with metastatic breast cancer with known brain metastasis s/p whole brain radiation and gamma knife radiation in the past now presenting with acute delerium. - per resident note   Precautions/Limitations fall precautions   Heart Disease Risk Factors Medical history   General Observations Pt sitting up in bed, appearing frail with  present and supportive of patient.    General Info Comments Activity: ambulate with assist. [facility]   Cognitive Status Examination   Orientation person;place  (Not oriented to year or day)   Level of Consciousness alert  (intermittently alert/lethargic)   Follows Commands and Answers Questions 75% of the time;able to follow single-step instructions   Personal Safety and Judgment impaired   Memory impaired   Pain Assessment   Patient Currently in Pain No   Integumentary/Edema   Integumentary/Edema no deficits were identifed   Posture    Posture Forward head position;Protracted shoulders;Kyphosis   Range of Motion (ROM)   ROM Comment L wrist/fingers contracted into flexion - per  pt has a splint that she wears intermittently.    Strength   Strength Comments Hip flex: (R) 3/5, (L) 2/5. Hip ext: (R) 2/5, (L) 2/5. Knee ext: (R) 3/5, (L) 2/5. Knee flex: (R) 3/5, (L) 2/5. Ankle DF: (R) 3/5, (L) 2-/5. Ankle PF: (R) 2+/5, (L) 2-/5. Strength assess in  "sitting EOB and in supine. Difficulty with assessing strength secondary to cognitive impairments.    Bed Mobility   Bed Mobility Comments Max A to roll and max A for sidelying <> EOB.    Transfer Skills   Transfer Comments Total A x 2 for sit <> stand from EOB. Pivot transfer not indicated at this time.    Gait   Gait Comments Unable to assess.    Balance   Balance Comments Pt requires min-mod A to maintain sitting balance at EOB.    Sensory Examination   Sensory Perception Comments Pt unable to state   General Therapy Interventions   Planned Therapy Interventions balance training;bed mobility training;gait training;ROM;strengthening;stretching;transfer training;risk factor education;progressive activity/exercise;other (see comments)  (care giver training)   Clinical Impression   Criteria for Skilled Therapeutic Intervention yes, treatment indicated   PT Diagnosis Impaired functional mobility   Influenced by the following impairments Impaired B LE/UE strength and ROM, poor endurance, impaired cognition,    Functional limitations due to impairments Impaired functional mobility and increase burdern of care on  who is the pt's care-giver.    Clinical Presentation Evolving/Changing   Clinical Presentation Rationale See team notes. Medical status. Evolving etiology of delerium   Clinical Decision Making (Complexity) Moderate complexity   Therapy Frequency` other (see comments)  (6x/week)   Predicted Duration of Therapy Intervention (days/wks) 1.5 weeks   Anticipated Equipment Needs at Discharge (none anticipated at this time)   Anticipated Discharge Disposition Transitional Care Facility   Risk & Benefits of therapy have been explained Yes   Patient, Family & other staff in agreement with plan of care Yes   [facility] AM-PAC TM \"6 Clicks\"   2016, Trustees of [facility], under license to [employer].  All rights reserved.   6 Clicks Short Forms Basic Mobility Inpatient Short Form   [facility] " "AM-PAC  \"6 Clicks\" V.2 Basic Mobility Inpatient Short Form   1. Turning from your back to your side while in a flat bed without using bedrails? 2 - A Lot   2. Moving from lying on your back to sitting on the side of a flat bed without using bedrails? 2 - A Lot   3. Moving to and from a bed to a chair (including a wheelchair)? 1 - Total   4. Standing up from a chair using your arms (e.g., wheelchair, or bedside chair)? 1 - Total   5. To walk in hospital room? 1 - Total   6. Climbing 3-5 steps with a railing? 1 - Total   Basic Mobility Raw Score (Score out of 24.Lower scores equate to lower levels of function) 8   Total Evaluation Time   Total Evaluation Time (Minutes) 12     "

## 2017-02-19 NOTE — PROGRESS NOTES
02/19/17 1000   General Information   Onset Date 02/17/17   Start of Care Date 02/19/17   Referring Physician Zachary Romo MD   Patient Profile Review/OT: Additional Occupational Profile Info See Profile for full history and prior level of function   Patient/Family Goals Statement Pt's  hopes she can start eating/drinking today.   Swallowing Evaluation Bedside swallow evaluation   Behaviorial Observations Confused   Mode of current nutrition NPO   Respiratory Status Room air   Comments Ms. Venus Hyatt is a 75 yo female patient with metastatic breast cancer with known brain metastasis s/p whole brain radiation and gamma knife radiation in the past now presenting with acute delerium. SLP consulted to assess swallowing safety and make PO diet recommendations.   Clinical Swallow Evaluation   Oral Musculature generally intact   Structural Abnormalities none present   Dentition present and adequate   Secretion Management other (see comments)  (WFL)   Mucosal Quality adequate   Mandibular Strength and Mobility intact   Oral Labial Strength and Mobility WFL   Lingual Strength and Mobility WFL   Laryngeal Function Throat clear;Cough;Swallow;Voicing initiated;Dry swallow palpated   Oral Musculature Comments adequate oral musculature   Clinical Swallow Eval: Thin Liquid Texture Trial   Mode of Presentation, Thin Liquids spoon;straw;fed by clinician;self-fed   Volume of Liquid or Food Presented 6 oz thin H20   Oral Phase of Swallow WFL   Pharyngeal Phase of Swallow intact   Diagnostic Statement WNL with thin consistency   Clinical Swallow Eval: Puree Solid Texture Trial   Mode of Presentation, Puree spoon;fed by clinician   Volume of Puree Presented 2 oz puree   Oral Phase, Puree WFL   Oral Residue, Puree other (see comments)  (none)   Pharyngeal Phase, Puree intact   Diagnostic Statement WNL with puree texture   Clinical Swallow Eval: Solid Food Texture Trial   Mode of Presentation, Solid self-fed;fed by  clinician   Volume of Solid Food Presented 1 ronni cracker   Oral Phase, Solid other (see comments)  (extended but adequate mastication)   Oral Residue, Solid other (see comments)  (none)   Pharyngeal Phase, Solid intact   Diagnostic Statement WNL with regular texture   Esophageal Phase of Swallow   Patient reports or presents with symptoms of esophageal dysphagia No   General Therapy Interventions   Planned Therapy Interventions (tx not indicated, evaluation only)   Swallow Eval: Clinical Impressions   Skilled Criteria for Therapy Intervention Current level of function same as previous level of function   Functional Assessment Scale (FAS) 6   Treatment Diagnosis WFL oropharyngeal swallowing mechanism   Diet texture recommendations Regular diet;Thin liquids   Recommended Feeding/Eating Techniques alternate between small bites and sips of food/liquid;maintain upright posture during/after eating for 30 mins;small sips/bites;other (see comments)  (1:1 assistance)   Demonstrates Need for Referral to Another Service dietitian;occupational therapy;physical therapy   Therapy Frequency other (see comments)  (Evaluation Only)   Anticipated Discharge Disposition home w/ assist   Risks and Benefits of Treatment have been explained. Yes   Patient, family and/or staff in agreement with Plan of Care Yes   Clinical Impression Comments Clinical dysphagia examination completed per MD order. Pt's  provided hx and reports that the pt eats a regular diet and thin liquids at home with his assistance and without difficulty. This session the pt was alert and cooperative but confused (at or close to baseline mentation per ).  The pt consumed trials of thin consistency, regular textures and puree textures with adequate bolus control, no oral residue, and no outward signs/symptoms of aspiration. Mastication mildly slow but c/w baseline per . Pt takes large bites when attempting to self feed d/t poor coordination. Pt's   typically assists with all PO to provide small bites/sips and slow pacing. Recommend a regular texture diet and thin liquids. Encourage pt/ continue preestablished safety precautions. SLP to sign off as swallowing function appears c/w baseline function.     Total Evaluation Time   Total Evaluation Time (Minutes) 15

## 2017-02-19 NOTE — PROGRESS NOTES
[facility] [place]    Hematology / Oncology Progress Note    Date of Admission:  2/17/2017  Date of Service (when I saw the patient): 02/19/2017    Assessment & Plan   Venus Hyatt is a 76 year old female with metastatic breast cancer with known brain metastasis s/p whole brain radiation and gamma knife radiation in the past now presenting with acute change in mental status.      #. Acute change in mental status: Etiology not clear and the differential may include pharmacologic side effects due the patient's sedative meds, possible infection, or perhaps due to CNS involvement of the patient's malignancy. MRI revealed stable disease however. No clear evidence of infection. MRA without evidence of aneurysm, stenosis, or infarct. LP obtained, Cx pending. No PMNs or organisms seen on gram stain.  -limiting narcotic sedation  -holding sedative hypnotics  -will continue gabapentin as the patient is on a low dose   -cultures in process  -Will have MRI spine under conscious sedation to evaluate for leptomeningeal disease.   -may need to consider neurology consult to assess for transient seizure activity     #. Possible acute right-sided weakness: Noticed prior to admission. Per Pt's  he is unsure whether she had weakness or if the patient just was unsure of what she was being asked to do given her mental status. MRI and MRA without sign of acute changes. No weakness noted on exam this morning. She does have baseline weakness on left side from known brain mets.    #. 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 Most recently she is receiving Gemzar, carboplatin and Herceptin every 2 wks (last given on 2/9). Followed by Dr. Mares.  -will continue keppra for seizure ppx, converted to IV formulation.       #. Pancytopenia: suspect 2/2 to primary marrow failure due to the patient's malignancy or 2/2 patient's prior chemotherapy.  No need for any transfusion as this time.   -We'll check zinc and HIV to evaluate for lymphopenia. Checking viral etiologies with LP.       PPx: hold SQ heparin      FEN:   - NPO pending swallow evaluation, completion of MRI w/ conscious sedation  - K+ replacement prn  - D5W + NS 75 mL/hr      Code Status: DNR/DNI  Disposition: Pending further work-up    Patient discussed with Dr. Long who agrees with assessment and plan.   Crys Pierson PA-C  Hematology/Oncology  Pager: [phone]    Interval History   Venus is says she is feeling well this morning and offers no complaints. Discussed with Venus and her , Colt, the plan to obtain an MRI with conscious sedation today. She will also have SLP evaluation.     Vital Signs with Ranges  Temp:  [96.4  F (35.8  C)-97.9  F (36.6  C)] 96.9  F (36.1  C)  Pulse:  [83-88] 88  Heart Rate:  [79-85] 84  Resp:  [14-16] 16  BP: (100-148)/(50-86) 115/50  SpO2:  [97 %-100 %] 98 %    I/O last 3 completed shifts:  In: 1025 [I.V.:1025]  Out: -     There were no vitals filed for this visit.    Physical Exam   GEN: thin female lying in bed, comfortable appearing, in no distress  HEENT: NC/AT, sclera anicteric, EOMI, no oral thrush, no mouth sores, MMM  CV: RRR, S1/2 present no m/r/g, no LE peripheral edema  LUNG: comfortable on room air, CTAB, no wheezing rales or rhonchi  Abdomen: flat, soft, no distension, no tenderness to palpation, + BS  MSK: no gross deformities  SKIN: warm, dry, no rash  NEURO: RUE 5/5 strength biceps and triceps, strong . Not oriented to place, time, or situation.  PSYCH: appropriate affect and mood.      Medications     - MEDICATION INSTRUCTIONS -       dextrose 5% and 0.9% NaCl 75 mL/hr at 02/19/17 0919         midazolam  0.5-1 mg Intravenous Once within 24 hrs     fentaNYL  25-50 mcg Intravenous Once within 24 hrs     gabapentin  100 mg Oral BID     sodium chloride (PF)  20 mL Intracatheter Once     heparin lock flush  5-10 mL  Intracatheter Q24H     heparin  5 mL Intracatheter Q28 Days     sodium chloride (PF)  10 mL Intravenous Once     levETIRAcetam  500 mg Intravenous Q12H       Data   CBC   Recent Labs  Lab 02/19/17  0808 02/18/17  0837 02/17/17  1725   WBC 1.8* 2.3* 2.6*   RBC 2.28* 2.49* 2.62*   HGB 7.5* 8.4* 8.8*   HCT 22.9* 24.9* 26.5*    100 101*   MCH 32.9 33.7* 33.6*   MCHC 32.8 33.7 33.2   RDW 17.1* 16.8* 17.0*   PLT 56* 57* 81*       CMP   Recent Labs  Lab 02/19/17  0808 02/18/17  0837 02/17/17  1725    144 145*   POTASSIUM 3.2* 3.9 3.8   CHLORIDE 112* 112* 109   CO2 24 23 26   ANIONGAP 7 9 9   GLC 88 123* 84   BUN 5* 8 15   CR 0.38* 0.35* 0.39*   GFRESTIMATED >90Non  GFR Calc >90Non  GFR Calc >90Non  GFR Calc   GFRESTBLACK >90African American GFR Calc >90African American GFR Calc >90African American GFR Calc   CARLIN 7.2* 7.8* 8.7       LFTs No lab results found in last 7 days.    Coagulation Studies   Recent Labs  Lab 02/18/17  1114   INR 1.08

## 2017-02-19 NOTE — PLAN OF CARE
Problem: Goal Outcome Summary  Goal: Goal Outcome Summary  Clinical dysphagia examination completed per MD order. Pt's  provided hx and reports that the pt eats a regular diet and thin liquids at home with his assistance and without difficulty. This session the pt was alert and cooperative but confused (at or close to baseline mentation per ).  The pt consumed trials of thin consistency, regular textures and puree textures with adequate bolus control, no oral residue, and no outward signs/symptoms of aspiration. Mastication mildly slow but c/w baseline per . Pt takes large bites when attempting to self feed d/t poor coordination. Pt's  typically assists with all PO to provide small bites/sips and slow pacing. Recommend a regular texture diet and thin liquids. Encourage pt/ continue preestablished safety precautions. SLP to sign off as swallowing function appears c/w baseline function.

## 2017-02-19 NOTE — PLAN OF CARE
Problem: Goal Outcome Summary  Goal: Goal Outcome Summary  PT 7C: Evaluation complete and treatment indicated. Pt presenting with impairments in cognition/orientation, strength B UE/LE (L > R), joint ROM B UE/LE (L > R), and sitting/standing balance. Pt max A to roll, max A 1-2 for supine <> EOB, total A x 2 for sit <> stand. Pt's  is primary caretaker of pt with pt's baseline performing sit <> stand with min-modA, bed mobility min-mod A, and pivot transfers with min A and pt standing. Secondary to below baseline functioning and increased burden of care currently on , recommending discharge to TCU when medically appropriate to decrease current level of assist for mobility and return pt to PLOF with  able to assist.

## 2017-02-19 NOTE — PLAN OF CARE
Problem: Goal Outcome Summary  Goal: Goal Outcome Summary  Outcome: No Change  Patient denies pain, no nausea, forgetful at times, strict NPO, Lumbar puncture performed on previous shift, Port infusing, incontinent of urine x 4, repositioned pt every 2 hours, unable to collect sample for UA due to incontinence, MD notified and order for oates catheter, bed alarm on, call light in reach.     Update: Patient has order for Swallow Eval consult, please follow up.

## 2017-02-19 NOTE — PLAN OF CARE
Problem: Goal Outcome Summary  Goal: Goal Outcome Summary  OT 7C: eval orders received and appreciated. Per chart review and discussion with pts , OT is appropriate to hold on services at this time. Pt is dependent for self-cares at baseline, but will need assistance to ensure safe completion of functional transfers. PT would be more appropriate therapy discipline to be following during hospital stay, will check in with PT if needs arise.

## 2017-02-19 NOTE — PROGRESS NOTES
Problem: Goal Outcome Summary  Goal: Goal Outcome Summary  Outcome: No Change  VSS afebrile, on room air.  Forgetful, bed alarm on. Right sided weakness, turn q 2 hr.  Patient denies pain, no nausea.  NPO, Speech will see patient for dysphagia screening.  Lumbar puncture covered.  Port infusing, incontinent of urine x 1, Small BM loose x 1.  ROM performed on ULE, call light in reach, spouse at bedside.  Continue with POC.

## 2017-02-19 NOTE — PLAN OF CARE
Problem: Goal Outcome Summary  Goal: Goal Outcome Summary  Outcome: No Change  VSS. Denies pain. Denies nausea. Straight cathed this morning for a UA, otherwise incontinent of urine x1 and stools x1. Port infusing MIVF. Will be going down to MRI for 2.5 hrs. Will be receiving conscious sedation. Checklist done. Orders in. Consent signed. Float nurse will be escorting the pt. Repro q2h. PT and OT saw pt. Speech therapy cleared pt to be able to eat and drink safely.      PLAN: repro q2h, MRI, potassium needs to be replaced

## 2017-02-20 NOTE — PLAN OF CARE
Problem: Goal Outcome Summary  Goal: Goal Outcome Summary  Outcome: No Change  Patient denies pain, no nausea, forgetful at times, Spine MRI performed this evening, Port infusing, incontinent of urine, repositioned pt every 2 hours, pt passed on swallow  eval and is allowed to eat,  Potassium level 3.2 replaced, redraw for 02 am,  bed alarm on, call light in reach.     Update: Patient on started on Regular diet, tolerated well.

## 2017-02-20 NOTE — PLAN OF CARE
Problem: Goal Outcome Summary  Goal: Goal Outcome Summary  Pt stable overnight. Denies pain. Is confused. Only oriented to self.  at bedside. Turn q 2 hrs. Incontinent of urine. K reheck 3.8, platelets down to 48. Will continue to monitor pt.

## 2017-02-20 NOTE — PROVIDER NOTIFICATION
02/19/17 2100   Intraprocedure Patient Information   LEVEL OF SEDATION Awake & alert   Vital Signs   Temp 98  F (36.7  C)   Temp src Axillary   Heart Rate 76   /60   BP Location Right arm   Oxygen Therapy   SpO2 98 %   O2 Device None (Room air)   Sedation Medications   Midazolam (VERSED) mg IV dose total 3   Fentanyl (SUBLIMAZE) mcg IV dose total 100   Phase I Discharge Criteria   Activity 2-->able to move four extremities voluntarily or on command   Respiration 2-->able to breathe and cough freely   Circulation 2-->BP within 20% of pre-anesthetic level   Consciousness 2-->fully awake   Color 2-->pink   Score 10   Maximum Sedation Level Achieved Minimal (Normal response)   Transfer Report given to:  CHRIS Crawford   Transfer Report given by:  Yolis MC RN     No obvious ill effects noted from sedation medications. Writer with patient from [id number].

## 2017-02-20 NOTE — PLAN OF CARE
Problem: Goal Outcome Summary  Goal: Goal Outcome Summary  PT-7C Transferred lying->sitting with mod-max A x 2, needing Mod-Max A x 2 to maintain static sitting balance but can sit upright intermittently with verbal cueing.  Completed 2 sit->stands with Max A x 2 and a step around pivot transfer from bed to recliner chair Max A x 2.     Recommend TCU at discharge to continue to improve functional strength, mobility, and transfers.

## 2017-02-20 NOTE — PROGRESS NOTES
[facility], [place]    Hematology / Oncology Progress Note    Date of Admission:  2/17/2017  Date of Service (when I saw the patient): 02/20/2017    Assessment & Plan   Venus Hyatt is a 76 year old female with metastatic breast cancer with known brain metastasis s/p whole brain radiation and gamma knife radiation in the past now presenting with acute change in mental status.      #. Acute change in mental status: Improved since admission. Etiology not clear and the differential may include pharmacologic side effects due the patient's sedative meds, possible infection, or perhaps due to CNS involvement of the patient's malignancy. MRI revealed stable disease however. No clear evidence of infection. MRA without evidence of aneurysm, stenosis, or infarct. LP obtained, Cx pending. No PMNs or organisms seen on gram stain. MRI spine does not show evidence of mets in spine.  -limiting narcotic sedation  -holding sedative hypnotics  -will continue gabapentin as the patient is on a low dose   -may need to consider neurology consult to assess for transient seizure activity   -UA with large leukocyte esterase, >182 WBCs with clumps, few bacteria, trace blood; UCx in process  -Start Bactrim DS bid for 5 days    #. Possible acute right-sided weakness: Noticed prior to admission. Per Pt's  he is unsure whether she had weakness or if the patient just was unsure of what she was being asked to do given her mental status. MRI and MRA without sign of acute changes. No weakness noted on exam this morning. She does have baseline weakness on left side from known brain mets.    #. 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 Most recently she is receiving Gemzar, carboplatin and Herceptin every 2 wks (last given on 2/9). Followed by Dr. Mares.  -will continue Keppra for seizure ppx, switch back to oral Keppra.       #. Pancytopenia:  suspect 2/2 to primary marrow failure due to the patient's malignancy or 2/2 patient's prior chemotherapy. No need for any transfusion as this time. CSF negative for HSV.  -Check zinc and HIV to evaluate for lymphopenia, pending. Checking viral etiologies with LP.       PPx: hold SQ heparin      FEN:   - Regular diet  - K+ replacement prn  - D5W + NS 75 mL/hr -- d/c      Code Status: DNR/DNI  Disposition: Will likely need either TCU or other assistance with care on discharge. Otherwise medically ready to leave soon.    Patient discussed with Dr. Long who agrees with assessment and plan.   Crys Pierson PA-C  Hematology/Oncology  Pager: [phone]    Interval History   Venus reports she is feeling well this morning. She had some breakfast this morning. Discussed with Venus and her , Colt, the findings from the MRI and UA from yesterday. Colt has some concerns regarding discharge placement and ability to care for Venus at home. Will talk with RN Care Coordinator and SW.    Vital Signs with Ranges  Temp:  [95.9  F (35.5  C)-98.2  F (36.8  C)] 95.9  F (35.5  C)  Pulse:  [71-87] 76  Heart Rate:  [71-97] 78  Resp:  [16-18] 17  BP: ()/(43-82) 126/57  SpO2:  [93 %-99 %] 99 %    I/O last 3 completed shifts:  In: 1820 [I.V.:1820]  Out: -     Physical Exam   GEN: thin female lying in bed, resting comfortably, in no distress  HEENT: NC/AT, sclera anicteric, EOMI, MMM, good dentition  CV: RRR, S1/2 present no m/r/g, no LE peripheral edema  LUNG: comfortable on room air, CTAB, no wheezing rales or rhonchi  Abdomen: flat, soft, no distension, no tenderness to palpation, + BS  MSK: no gross deformities  SKIN: warm, dry, no rash  NEURO: RUE 5/5 strength biceps and triceps, strong .   PSYCH: appropriate affect and mood.      Medications     - MEDICATION INSTRUCTIONS -           sulfamethoxazole-trimethoprim  1 tablet Oral BID     levETIRAcetam  500 mg Oral BID     gabapentin  100 mg Oral BID      sodium chloride (PF)  20 mL Intracatheter Once     heparin lock flush  5-10 mL Intracatheter Q24H     heparin  5 mL Intracatheter Q28 Days     sodium chloride (PF)  10 mL Intravenous Once       Data   CBC     Recent Labs  Lab 02/20/17 0315 02/19/17  0808 02/18/17  0837 02/17/17  1725   WBC 2.1* 1.8* 2.3* 2.6*   RBC 2.32* 2.28* 2.49* 2.62*   HGB 7.8* 7.5* 8.4* 8.8*   HCT 23.5* 22.9* 24.9* 26.5*   * 100 100 101*   MCH 33.6* 32.9 33.7* 33.6*   MCHC 33.2 32.8 33.7 33.2   RDW 18.0* 17.1* 16.8* 17.0*   PLT 56*  48* 56* 57* 81*       CMP     Recent Labs  Lab 02/20/17 0315 02/19/17  0808 02/18/17  0837 02/17/17  1725    143 144 145*   POTASSIUM 3.7  3.8 3.2* 3.9 3.8   CHLORIDE 116* 112* 112* 109   CO2 20 24 23 26   ANIONGAP 8 7 9 9   GLC 94 88 123* 84   BUN 3* 5* 8 15   CR 0.34* 0.38* 0.35* 0.39*   GFRESTIMATED >90Non  GFR Calc >90Non  GFR Calc >90Non  GFR Calc >90Non  GFR Calc   GFRESTBLACK >90African American GFR Calc >90African American GFR Calc >90African American GFR Calc >90African American GFR Calc   CARLIN 7.5* 7.2* 7.8* 8.7       LFTs No lab results found in last 7 days.    Coagulation Studies     Recent Labs  Lab 02/18/17  1114   INR 1.08

## 2017-02-20 NOTE — PLAN OF CARE
Problem: Goal Outcome Summary  Goal: Goal Outcome Summary  Outcome: Improving  VSS. Disorientated to time;place;sitauation, Speech slurred at times but is able to make needs known. Denies pain. Denies nausea. Port hep locked. Started on PO antibiotics today. Incontinent of urine x2 and stool x1. Tolerating regular diet. PT and OT saw pt today. Repro q2h. Pt currently in chair. Had a bed bath today.      PLAN: waiting for TCU placement

## 2017-02-21 NOTE — PLAN OF CARE
Problem: Goal Outcome Summary  Goal: Goal Outcome Summary  OT/7C- OT holding.  At this point, pt continues to be followed by one discipline (PT) to address needs.  Per chart review, pt is dependent with self care at baseline, but is below baseline for functional transfers and mobility. PT addressing these needs. Anticipate discharge to TCU for continued therapy (PT and OT).

## 2017-02-21 NOTE — PLAN OF CARE
Problem: Goal Outcome Summary  Goal: Goal Outcome Summary  Outcome: Therapy, progress toward functional goals as expected  VSS. Oriented to self; able to make needs known. Pt denies pain, denies nausea. Tolerating small amounts of regular diet; requires assistance with feeding,  assisting with feed. Repositioned q2hrs. Incontinent with brief changes. Port heparin locked. Pt resting comfortably. Continue POC.

## 2017-02-21 NOTE — PLAN OF CARE
Problem: Goal Outcome Summary  Goal: Goal Outcome Summary  Outcome: No Change  VSS. Disorientated to time;place;situation. Increased confusion this afternoon. Denies pain. Denies nausea. Port hep locked. Incontinent of urine. Repro q2h. Bed bath this AM. Tolerating regular diet. Waiting for TCU placement.

## 2017-02-21 NOTE — PROGRESS NOTES
[facility], [place]    Hematology / Oncology Progress Note    Date of Admission:  2/17/2017  Date of Service (when I saw the patient): 02/21/2017    Assessment & Plan   Venus Hyatt is a 76 year old female with metastatic breast cancer with known brain metastasis s/p whole brain radiation and gamma knife radiation in the past now presenting with acute change in mental status.      #. Acute change in mental status.  #. Acute UTI with hematuria. UA with large leukocyte esterase, >182 WBCs with clumps, few bacteria, trace blood; UCx growing out 50-100K E. coli and 10-50K Proteus mirabilis with sensitivities pending.  Mental status is improved since admission. Etiology most likely related to UTI as no evidence of disease progression. MRI revealed stable disease however. MRA without evidence of aneurysm, stenosis, or infarct. LP obtained, Cx pending. No PMNs or organisms seen on gram stain. MRI spine does not show evidence of mets in spine.  -limiting narcotic sedation  -holding sedative hypnotics  -will continue gabapentin as the patient is on a low dose   -may need to consider neurology consult to assess for transient seizure activity   -Continue Bactrim DS bid for 5 days (x2/21), may adjust based on sensitivities    #. Possible acute right-sided weakness. Noticed prior to admission. Per Pt's  he is unsure whether she had weakness or if the patient just was unsure of what she was being asked to do given her mental status. MRI and MRA without sign of acute changes. No weakness noted on exam this morning. She does have baseline weakness on left side from known brain mets. Appears to be improving.  -Continue working with PT  -TCU on discharge    #. 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 Known brain metastasis with bone involvement as well. Most recently she is receiving Gemzar, carboplatin and Herceptin every 2 wks (last given on 2/9). Followed by Dr. Mares.  -will  continue oral Keppra for seizure ppx      #. Pancytopenia. suspect 2/2 to primary marrow failure due to the patient's malignancy or 2/2 patient's prior chemotherapy, most recently on 2/9. No need for any transfusion at this time. CSF negative for HSV. HIV is non-reactive. Zinc is low. Counts are somewhat improved over last few days.      PPx: hold SQ heparin      FEN:   - Regular diet  - K+ replacement prn      Code Status: DNR/DNI  Disposition: Currently seeking placement at TCU facilities, otherwise medically ready to leave.    Patient discussed with Dr. Long who agrees with assessment and plan.   Crys Pierson PA-C  Hematology/Oncology  Pager: [phone]    Interval History   Venus is awake and alert this morning. She is currently eating breakfast with assistance from her . Discussed plan for SW and CC to talk with Colt regarding discharge placement options as well as long-term planning for care.     Vital Signs with Ranges  Temp:  [96.6  F (35.9  C)-98.1  F (36.7  C)] 98.1  F (36.7  C)  Pulse:  [76-79] 76  Heart Rate:  [63] 63  Resp:  [14-18] 14  BP: (105-129)/(56-79) 129/64  SpO2:  [98 %-100 %] 98 %    I/O last 3 completed shifts:  In: 180 [P.O.:180]  Out: -     Physical Exam   GEN: thin female sitting in bed eating breakfast, in no distress  HEENT: NC/AT, sclera anicteric, EOMI, MMM, good dentition  CV: RRR, S1/2 present no m/r/g, no LE peripheral edema  LUNG: comfortable on room air, CTAB, no wheezing rales or rhonchi  Abdomen: flat, soft, no distension, no tenderness to palpation, + BS  MSK: no gross deformities  SKIN: warm, dry, no rash  NEURO: RUE 5/5 strength biceps and triceps, strong .   PSYCH: appropriate affect and mood.      Medications     - MEDICATION INSTRUCTIONS -           sulfamethoxazole-trimethoprim  1 tablet Oral BID     levETIRAcetam  500 mg Oral BID     gabapentin  100 mg Oral BID     sodium chloride (PF)  20 mL Intracatheter Once     heparin lock flush  5-10 mL  Intracatheter Q24H     heparin  5 mL Intracatheter Q28 Days     sodium chloride (PF)  10 mL Intravenous Once       Data   CBC     Recent Labs  Lab 02/21/17  0837 02/20/17  0315 02/19/17  0808 02/18/17  0837   WBC 2.2* 2.1* 1.8* 2.3*   RBC 2.73* 2.32* 2.28* 2.49*   HGB 9.0* 7.8* 7.5* 8.4*   HCT 27.6* 23.5* 22.9* 24.9*   * 101* 100 100   MCH 33.0 33.6* 32.9 33.7*   MCHC 32.6 33.2 32.8 33.7   RDW 17.9* 18.0* 17.1* 16.8*   PLT 68* 56*  48* 56* 57*       CMP     Recent Labs  Lab 02/21/17  0837 02/20/17  0315 02/19/17  0808 02/18/17  0837    144 143 144   POTASSIUM 3.9 3.7  3.8 3.2* 3.9   CHLORIDE 113* 116* 112* 112*   CO2 22 20 24 23   ANIONGAP 8 8 7 9   GLC 76 94 88 123*   BUN 4* 3* 5* 8   CR 0.52 0.34* 0.38* 0.35*   GFRESTIMATED >90Non  GFR Calc >90Non  GFR Calc >90Non  GFR Calc >90Non  GFR Calc   GFRESTBLACK >90African American GFR Calc >90African American GFR Calc >90African American GFR Calc >90African American GFR Calc   CARLIN 7.9* 7.5* 7.2* 7.8*       LFTs No lab results found in last 7 days.    Coagulation Studies     Recent Labs  Lab 02/18/17  1114   INR 1.08

## 2017-02-21 NOTE — PLAN OF CARE
Problem: Goal Outcome Summary  Goal: Goal Outcome Summary  Outcome: No Change  /56  Pulse 79  Temp 97.4  F (36.3  C) (Axillary)  Resp 16  Wt 45.9 kg (101 lb 1.6 oz)  SpO2 98%  BMI 18.2 kg/m2     Alert and oriented to self only. VSS on room air. Port hep locked. No c/o pain or nausea. Incontinent of urine. Turned/repositioned q2h.  at bedside. Pt slept well between cares. Will continue to monitor follow POC.

## 2017-02-21 NOTE — CONSULTS
Social Work: Assessment with Discharge Plan    Patient Name:  Venus Hyatt  :  1940  Age:  76 year old  MRN:  [id number]  Risk/Complexity Score:  Filed Complexity Screen Score: 12  Completed assessment with:  Pt's , RN CC, PA    Presenting Information   Reason for Referral:  Discharge plan  Date of Intake:  2017  Referral Source:  Physician  Decision Maker:  pt  Alternate Decision Maker:  No HCD on file, next of kin would be - Colt Zepeda  Health Care Directive: Pt not appropriate to complete document at present time due to confusion   Living Situation:  House, pt lives in a house with her    Previous Functional Status:   was pt's primary caregiver at home, would assist with all ADLs and all care needs; per  pt was going to outpatient PT about 3x/week   Patient and family understanding of hospitalization:  Pt's  understands reason for hospitalization   Cultural/Language/Spiritual Considerations:  Pt speaks English, Druze is listed as Rastafari   Adjustment to Illness:  Pt's  seems to be coping appropriately     Physical Health  Reason for Admission:    1. Confusion    2. Malignant neoplasm of right female breast, unspecified site of breast (H)    3. Metastasis to brain (H)    4. Need for prophylactic chemotherapy      Services Needed/Recommended:  TCU is being recommended by PT    Mental Health/Chemical Dependency  Diagnosis:  None identified   Support/Services in Place:  -  Services Needed/Recommended:  -    Support System  Significant relationship at present time:  Pt's    Family of origin is available for support:  Pt's  was providing care for pt at home prior to admission   Other support available:  None identified   Gaps in support system:  None identified   Patient is caregiver to:  None     Provider Information   Primary Care Physician:  Ken Ferguson   [phone]   Clinic:  [address] /  [facility]      :  [facility] care coordinators     Financial   Income Source:  Retired  Financial Concerns:  None identified  Insurance:    Payor/Plan Subscriber Name Rel Member # Group #   MEDICARE - MEDICARE F* [employer] M[employer]ON[employer]D  [id number]       ATTN CLAIMS, [address]   MEDICA - MEDICA PRIME* INOCENCIO MCDONALD  [id number]      [address] [address]       Discharge Plan   Patient and family discharge goal:  EMILIA and RN CC discussed private pay home care vs. TCU vs. LTC;  is currently interested in TCU but understands that LTC is likely going to be needed in the near future and that [facility] would not provide enough care for pt; SW discussed insurance coverage for TCU vs. private pay for LTC  -SW and RN CC did explain to pt's  and PA that pt cannot do IV chemo at TCU because the TCU must pay for the cost of this; pt's 's understanding is that chemo will not be done and they will be following up with oncologist outpatient   Provided education on discharge plan:  YES  Patient agreeable to discharge plan:  YES  A list of Medicare Certified Facilities was provided to the patient and/or family to encourage patient choice. Patient's choices for facility are:  [facility] (ph: [phone], fax: [phone]- referral faxed, they are assessing; EMILIA also provided  with list of TCUs and requested that he select several more options for referrals to be made in case [facility] cannot accept  Will NH provide Skilled rehabilitation or complex medical:  YES  General information regarding anticipated insurance coverage and possible out of pocket cost was discussed. Patient and patient's family are aware patient may incur the cost of transportation to the facility, pending insurance payment: YES  Barriers to discharge:  Pt's medical stability, placement       Discharge Recommendations   Anticipated Disposition:  TCU referrals  Transportation Needs:  Medical:  Stretcher- will need  medical transport, likely stretcher   Name of Transportation Company and Phone:  [facility]    Amina Kirkland, [facility]  [phone] or [phone]

## 2017-02-21 NOTE — PLAN OF CARE
Problem: Goal Outcome Summary  Goal: Goal Outcome Summary  PT- 7C Transferred lying->sitting with Mod A x 1-2, needing total assist to scoot EOB and Min A x 1 to sit EOB.  Max A x 2 for sit->stand with step pivot transfer to chair.  Pt able to follow command to step with right foot but not left.  To strengthen LE pt completed seated LE exercises AAROM/PROM.     Recommend TCU at discharge for continued PT/OT needs for functional transfers and mobility.

## 2017-02-22 NOTE — PLAN OF CARE
"Problem: Individualization  Goal: Patient Preferences  Total care for turning and incont cares  Q 2hours. Fed meals, she only takes a few sips and nibbling with much coaxing by her . Confused, speech difficult to understand. She calls out if her  is not in her sight: \"Colt, help me\". Presently she is in the recliner chair (PT assisted).      "

## 2017-02-22 NOTE — PROGRESS NOTES
Social Work Services Progress Note    Hospital Day: 6  Date of Initial Social Work Evaluation:  2/21/17  Collaborated with:  Pt, pt's , medical team, TCU    Data:  SW continuing to follow.  Chart reviewed and discussed during rounds.      Intervention:  Discharge planning.     Assessment: Referrals: [facility] (ph: [phone], fax: [phone])- they can accept pt as long as she does not do outpatient IV chemo (very high cost to the TCU), they can accept pt tomorrow (Thurs)    SW updated pt and pt's , they are pleased that [facility] can accept pt tomorrow.  SW discussed transport and  is agreeable to stretcher transport, understands private pay cost.   also understands that pt cannot do IV chemo at the TCU because of the cost to the TCU;  wants follow-up soon with oncologist to discuss plan;  is hoping for short TCU stay and then having pt return home with services or consideration of LTC.     SW arranged transport through [facility] for 11:00am Thurs 2/23.       Pre-Admission Screening (PAS) online form has been completed.  The Level of Care (LOC) is:  Determined  Confirmation Code is:  [id number]  Patient/caregiver informed of referral to [facility] for Pre-Admission Screening for skilled nursing facility (SNF) placement and to expect a phone call post discharge from SNF.     Plan:    Anticipated Disposition:  TCU    Barriers to d/c plan:  Placement     Follow Up:  Continue to follow to facilitate discharge    Amina Kirkland [facility] for [facility]  [phone] or [phone]

## 2017-02-22 NOTE — PLAN OF CARE
"Problem: Goal Outcome Summary  Goal: Goal Outcome Summary  PT 7C: Engaged pt in bed mobility mod A, sitting at EOB with min-mod A at trunk to maintain sitting balance for ~5 minutes, sit <> stand and pivot transfer from EOB > bedside chair with max A x 2, and B UE/LE strengthening/stretching activities. Pt with improved alertness today and following ~75% of commands as well. Pt disoriented to time and place but able to hold short conversation about past golfing today. PT recommending discharge to TCU when medically appropriate secondary to below baseline functional transfers and increased risk of pt and /care-giver injury if pt would return home. Pt appropriate for TCU to further improve strength, balance, and continued care-giver training.      Pivot with AX2 + gait belt - pt unable to side step. Transfer towards \"strong\" side (R side) as able.       "

## 2017-02-22 NOTE — TELEPHONE ENCOUNTER
home care is requesting home care order -- [facility] 1 time a week for 9 weeks.  Verbal auth given.

## 2017-02-22 NOTE — PLAN OF CARE
Problem: Goal Outcome Summary  Goal: Goal Outcome Summary  Outcome: No Change  VSS. Disoriented to time, place, situation. Denies nausea. Leg pain resolved with repositioning. Repositioned q2h. Incontinent of urine.  at beside. Waiting for TCU placement.

## 2017-02-22 NOTE — PLAN OF CARE
Problem: Individualization  Goal: Patient Preferences  Morning  reported that he was able to draw 5 cc blood (which was the throw volume) but then unable to obtain a further specimen. I also attempted to draw (easily flushes). Despite changing her body position and arm position -- no blood return. I de-accessed the PORT and re-accessed and again no blood flow. I informed Dr. Munoz ([id number]) and he stated he did not want to give TPA and that he would dc the am lab orders.  at bedside and aware of this situation.

## 2017-02-22 NOTE — PROGRESS NOTES
[facility] Progress Note    Patient name: Venus Hyatt    MRN: [id number]    Admission date: 2/17/2017          Assessment and plan:     Venus Hyatt is a 76 year old female with metastatic breast cancer with known brain metastasis s/p whole brain radiation and gamma knife radiation in the past now presenting with acute change in mental status.       #. Acute change in mental status.  #. Acute UTI with hematuria. UA with large leukocyte esterase, >182 WBCs with clumps, few bacteria, trace blood; UCx growing out 50-100K E. coli and 10-50K Proteus mirabilis   Mental status is improved since admission. Etiology most likely related to UTI as no evidence of disease progression. MRI revealed stable disease however. MRA without evidence of aneurysm, stenosis, or infarct. LP obtained, Cx pending. No PMNs or organisms seen on gram stain. MRI spine does not show evidence of mets in spine.  -limiting narcotic sedation  -holding sedative hypnotics  -will continue gabapentin as the patient is on a low dose   -may need to consider neurology consult to assess for transient seizure activity   -continue Bactrim DS bid for 5 days (x2/21)     #. Possible acute right-sided weakness. Noticed prior to admission. Per Pt's  he is unsure whether she had weakness or if the patient just was unsure of what she was being asked to do given her mental status. MRI and MRA without sign of acute changes. No weakness noted on exam this morning. She does have baseline weakness on left side from known brain mets. Appears to be improving.  -Continue working with PT  -TCU on discharge     #. 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 Known brain metastasis with bone involvement as well. Most recently she is receiving Gemzar, carboplatin and Herceptin every 2 wks (last given on 2/9). Followed by Dr. Mares.  -will continue oral Keppra for seizure ppx      #. Pancytopenia. suspect 2/2 to primary marrow failure due to the patient's  malignancy or 2/2 patient's prior chemotherapy, most recently on 2/9. No need for any transfusion at this time. CSF negative for HSV. HIV is non-reactive. Zinc is low. Counts are somewhat improved over last few days.      PPx: hold SQ heparin      FEN:   - Regular diet  - K+ replacement prn      Code Status: DNR/DNI  Disposition: Currently seeking placement at TCU facilities, otherwise medically ready to leave.    Patient discussed with Dr. Long who agrees with assessment and plan.   Zachary Romo MD   PGY4  [facility] Fellow        Subjective:   Nursing notes reviewed. Overnight  No significant events. Not eating much. No significant pain. No HA. No nausea, vomiting.    ROS: 4 point ROS including Respiratory, CV, GI and , other than that noted in the HPI, is negative           Objective:   Temp:  [96.5  F (35.8  C)-98.3  F (36.8  C)] 97  F (36.1  C)  Pulse:  [73-94] 73  Heart Rate:  [75-84] 84  Resp:  [16-18] 16  BP: (110-147)/(48-81) 110/59  SpO2:  [94 %-99 %] 94 %  Weights  Vitals reviewed  I/O  I/O last 3 completed shifts:  In: 710 [P.O.:710]  Out: -   General: Frail-appearing woman in no acute distress  HEENT: MMM. No oral lesions  CV: RRR, normal S1 and S2, no m/r/g  Resp: CTA bilaterally, normal vesicular breath sounds    GI: soft, non-tender, non-distended. Normoactive bs    Skin: no rash on exposed areas skin    Extremities: no edema    Neuro:  alert         Medications:       sulfamethoxazole-trimethoprim  1 tablet Oral BID     levETIRAcetam  500 mg Oral BID     gabapentin  100 mg Oral BID     sodium chloride (PF)  20 mL Intracatheter Once     heparin lock flush  5-10 mL Intracatheter Q24H     heparin  5 mL Intracatheter Q28 Days     sodium chloride (PF)  10 mL Intravenous Once            Labs:   The following labs were reviewed  CMP:    Recent Labs  Lab 02/21/17  0837 02/20/17  0315 02/19/17  0808 02/18/17  0837    144 143 144   POTASSIUM 3.9 3.7  3.8 3.2* 3.9   CHLORIDE 113* 116* 112* 112*    CO2 22 20 24 23   ANIONGAP 8 8 7 9   GLC 76 94 88 123*   BUN 4* 3* 5* 8   CR 0.52 0.34* 0.38* 0.35*   GFRESTIMATED >90Non  GFR Calc >90Non  GFR Calc >90Non  GFR Calc >90Non  GFR Calc   GFRESTBLACK >90African American GFR Calc >90African American GFR Calc >90African American GFR Calc >90African American GFR Calc   CARLIN 7.9* 7.5* 7.2* 7.8*     CBC:    Recent Labs  Lab 02/21/17  0837 02/20/17  0315 02/19/17  0808 02/18/17  0837   WBC 2.2* 2.1* 1.8* 2.3*   RBC 2.73* 2.32* 2.28* 2.49*   HGB 9.0* 7.8* 7.5* 8.4*   HCT 27.6* 23.5* 22.9* 24.9*   * 101* 100 100   MCH 33.0 33.6* 32.9 33.7*   MCHC 32.6 33.2 32.8 33.7   RDW 17.9* 18.0* 17.1* 16.8*   PLT 68* 56*  48* 56* 57*           Studies:   reviewed

## 2017-02-22 NOTE — PLAN OF CARE
Problem: Goal Outcome Summary  Goal: Goal Outcome Summary  Outcome: No Change  VSS. Disoriented x3. Pt c/o leg pain early in shift, which decreased with repositioning. Refused tylenol. Incontinence cares done and repositioned q 2 hrs. Ate small amount,  assisted with feeding. Taking Bactrim for UTI. No BM. Port hep locked. Waiting for TCU placement. Continue with POC.

## 2017-02-23 NOTE — PROGRESS NOTES
"[facility]  \"Hello, my name is Emelina Silveira , and I am calling from the [facility].  I want to check in and see how you are doing, after leaving the hospital.  You may also receive a call from your Care Coordinator (care team), but I want to make sure you don t have any urgent needs.  I have a couple questions to review with you:     Post-Discharge Outreach                                                    Venus Hyatt is a 76 year old female     Follow-up Appointments           Follow Up and recommended labs and tests       Follow up with Nursing home physician. No follow up labs or test are needed.  Follow up with primary care provider Dr Ferguson within 3-4 weeks. No follow up labs or test are needed.  Follow up with specialist, Dr Mares in oncology, in 2 weeks. No follow up labs or test are needed.                       Your next 10 appointments already scheduled            Mar 06, 2017 2:45 PM CST   (Arrive by 2:30 PM)   Return Visit with Bernardo Liu MD   [facility] ([facility])     [address] [place] [id number] [phone]                  Mar 16, 2017 1:00 PM CDT   (Arrive by 12:45 PM)   Return Visit with Ken Mares MD   [facility] ([facility])              Care Team:    Patient Care Team       Relationship Specialty Notifications Start End    Ken Ferguson MD PCP - General Internal Medicine  2/27/14     Phone: [phone] Fax: [phone]         [facility] [address] [place] [id number]    Leisa Jones RN Registered Nurse  Admissions 11/29/13     Ken Mares MD MD Oncology All results, Admissions 11/29/13     Phone: [phone] Fax: [phone]          [facility] [address] [facility] [address]    Katie Osorio, RN Nurse Coordinator Neurosurgery Admissions " 11/10/15     Phone: [phone]         Alejandra Alvarado, RN Nurse Coordinator Breast Oncology Admissions 9/19/16     Phone: [phone]         Ken Mares MD Referring Physician Oncology  9/29/16     Comment:  referring to interventional radiology    Phone: [phone] Fax: [phone]          [facility] [address] [facility] [address] [facility] [address]            Transition of Care Review                                                      Did you have a surgery or procedure during your hospital visit? No   If yes, do you have any of the following:     Signs of infection:  No    Pain:  No     Pain Scale (0-10) 0/10     Location: Na    Wound/incision concerns? Na    Do you have all of your medications/refills?  Yes    Are you having any side effects or questions about your medication(s)? No    Do you have any new or worsening symptoms?  No    Do you have any future appointments scheduled?   Yes    3/6/17             Plan                                                      Thanks for your time.  Your Care Coordinator may follow-up within the next couple days.  In the meantime if you have questions, concerns or problems call your care team.        Emelina Silveira

## 2017-02-23 NOTE — PLAN OF CARE
Problem: Goal Outcome Summary  Goal: Goal Outcome Summary  Outcome: Therapy, unable to show any progress toward functional goals  AVSS. Denies pain. Turning q2hr with assist of 2. Very unsteady on feet. Incontinent of urine. On regular diet and ate a few bites of dinner with assist of her spouse. Disoriented x3, but able to answer some questions appropriately. Calm and cooperative. Able to swallow pills with direction. Spouse present at bedside. Continue plan of care.

## 2017-02-23 NOTE — PLAN OF CARE
Problem: Goal Outcome Summary  Goal: Goal Outcome Summary  Outcome: Improving  VSS. Disoriented x3. Denies pain and no non-verbal indicators of pain present. Denies nausea. No BM. Incontinent of urine x1. Turning q2h. Regular diet, total feed.  at bedside and supportive w/ cares. Transport for discharge to [facility] set for 11am today. Continue w/ POC.

## 2017-02-23 NOTE — PLAN OF CARE
Problem: Goal Outcome Summary  Goal: Goal Outcome Summary  Physical Therapy Discharge Summary     Reason for therapy discharge:    Discharged to transitional care facility.     Progress towards therapy goal(s). See goals on Care Plan in [facility] electronic health record for goal details.  Goals partially met.  Barriers to achieving goals:   discharge from facility.     Therapy recommendation(s):    Continued therapy is recommended.  Rationale/Recommendations:  for care-giver education/training and to maximize IND and safety during functional transfers and ADLs to return pt to PLOF. .

## 2017-02-23 NOTE — PLAN OF CARE
Problem: Goal Outcome Summary  Goal: Goal Outcome Summary  Outcome: Adequate for Discharge Date Met:  02/23/17  Pt. Oriented only to self, not pace , time or situation. Poor appetite but ate fair amount with assistance  of feeding Pt. Incontinent of urine and stool. Turned q2hr.   at bedside helpful and supportive.Denies pain or nausea. Ready for transfer to TCU. Awaiting transport.

## 2017-02-23 NOTE — DISCHARGE SUMMARY
[facility], [place]    Discharge Summary  Heme/Onc    Date of Admission:  2/17/2017  Date of Discharge:  2/23/2017  Discharging Provider: Leela Miller  Date of Service (when I saw the patient): 02/23/17    Discharge Diagnoses   #Urinary tract infection     History of Present Illness   Ms. Venus Hyatt is a 77 yo female with metastatic breast cancer (invasive ductal carcinoma of the right breast) with known metastatic disease including brain involvement status whole brain radiation and gamma knife radiation in the past who presented to [facility] ED accompanied by her  with worsening confusion.     In brief (see H&P 2/17/17 for further details):   When the  woke up this am, he noticed that the patient was essentially non verbal and not following commands and weak compared to baseline. At baseline, the patient is able to converse with her  although has baseline left arm and leg weakness from known metastatic disease to the brain. The patient's  was barely able to have her swallow her pills prior to admission.     Hospital Course   Venus Hyatt was admitted on 2/17/2017.  The following problems were addressed during her hospitalization:    #. Encephalopathy  #. Acute UTI with hematuria.   UA with large leukocyte esterase, >182 WBCs with clumps, few bacteria, trace blood; UCx growing out 50-100K E. coli and 10-50K Proteus mirabilis. Mental status is improved since admission. Etiology most likely related to UTI as no evidence of disease progression. MRI revealed stable disease. MRA without evidence of aneurysm, stenosis, or infarct. LP obtained, Cx pending. No PMNs or organisms seen on gram stain. MRI spine did not show evidence of mets in spine. Patient discharged to TCU for further rehabilitation. She will complete a five day course of bactrim DS BID.     #. Possible acute right-sided weakness. Noticed prior to admission. Per Pt's  he is unsure whether  she had weakness or if the patient just was unsure of what she was being asked to do given her mental status. MRI and MRA without sign of acute changes.  She does have baseline weakness on left side from known brain mets. Appears to be improving. Patient will discharge to a TCU for further rehabilitation.       #. 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 Known brain metastasis with bone involvement as well. Most recently she is receiving Gemzar, carboplatin and Herceptin every 2 wks (last given on 2/9). Followed by Dr. Mares. She will continue oral Keppra for seizure ppx      #. Pancytopenia. Suspect secondary to primary marrow failure due to the patient's malignancy or 2/2 patient's prior chemotherapy, most recently on 2/9. CSF negative for HSV. HIV is non-reactive. Zinc is low. Counts improving.    #. Deconditioning. Patient was evaluated by physical therapy in the hospital due to decreased strength and balance below her baseline. Will discharge to a TCU for further rehabilitation.     #. Hypertension. Patient previously on amlodipine. This was held on admission and was discontinued at discharge. If she is hypertensive, facility physician can consider resuming.     Leela Miller  Internal Medicine PGY1  [phone]    Significant Results and Procedures    Cerebrospinal fluid (2/18)   - Negative for malignancy   - No organisms are identified on GMS stained cytologic specimen.   - Specimen Adequacy: Satisfactory for evaluation.     Urine culture (2/19)  - 50,000 to 100,000 colonies/mL Escherichia coli   - 10,000 to 50,000 colonies/mL Proteus mirabilis     MR brain 2/17  Impression:  1. No significant change since 11/14/2016. Stable multiple supratentorial and infratentorial treated enhancing metastatic lesions. No new metastatic focus.  2. Stable postradiation leukoencephalopathy.    MRA brain 2/18  Impression:  1. Motion degraded exam. Patent major intracranial arteries. No definite aneurysm or  stenosis.  2. No acute infarct.    MR spine 2/19  Impression:   1. Motion degraded examination. No definite evidence of metastatic disease in the cervicothoracic or lumbar spine. No cord signal abnormality.  2. Nonenhancing sclerotic osseous foci within the T11 and L5 vertebrae are not significantly changed dating back to at least 1/27/2014 and likely represent treated metastases.  3. Multilevel cervical and lumbar spondylosis and spondylolisthesis. Thoracolumbar scoliosis.  4. Incompletely characterized T2 hyperintense foci within the liver. Please see dedicated CT abdomen/pelvis report 2/8/2017 for further detail intra-abdominal findings.  5. Partially visualized multiple cerebellar treated metastases. Please see dedicated brain MRI report from 2/18/2017 for further detail of intercranial findings.    Pending Results     Unresulted Labs Ordered in the Past 30 Days of this Admission     Date and Time Order Name Status Description    2/18/2017 1243 Cytology non gyn - GMS stain for PCP, fungus - CSF Tube 4 In process     2/18/2017 1243 Cytology non gyn -CMV inclusion bodies - CSF Tube 4 In process     2/18/2017 0131 Blood culture Preliminary           Code Status   DNR / DNI       Primary Care Physician   Ken Ferguson    Physical Exam   Temp: 98.1  F (36.7  C) Temp src: Axillary BP: 131/72 Pulse: 88 Heart Rate: 88 Resp: 16 SpO2: 95 % O2 Device: None (Room air)    Vitals:    02/20/17 1300 02/21/17 1517   Weight: 45.9 kg (101 lb 1.6 oz) 49.9 kg (109 lb 14.4 oz)     Vital Signs with Ranges  Temp:  [95.9  F (35.5  C)-98.1  F (36.7  C)] 98.1  F (36.7  C)  Pulse:  [79-93] 88  Heart Rate:  [80-88] 88  Resp:  [16] 16  BP: (109-161)/(59-76) 131/72  SpO2:  [94 %-98 %] 95 %  I/O last 3 completed shifts:  In: 600 [P.O.:580; I.V.:20]  Out: -     Constitutional: Supine in bed, NAD.   Eyes: EOMI. Sclerae non-icteric.   ENT: MMM.   Respiratory: Normal respiratory effort, breathing comfortably on room air. No  wheezes/crackles/rhonchi on exam.   Cardiovascular: Regular. Normal S1, S2 without m/g/r.   GI: Soft, nondistended, nontender. Normoactive bowel sounds.   Genitourinary: No suprapubic tenderness.   Skin: No rashes on visualized skin. No jaundice.   Musculoskeletal: Cachetic. Moving all extremities.   Neurologic: Normal speech. Moving all extremities. CN III-XII grossly intact.   Neuropsychiatric: Responds to questions appropriately. Euthymic mood and congruent affect.     Discharge Disposition   Discharged to rehabilitation facility  Condition at discharge: Stable    Consultations This Hospital Stay   PHYSICAL THERAPY ADULT IP CONSULT  OCCUPATIONAL THERAPY ADULT IP CONSULT  SPIRITUAL HEALTH SERVICES IP CONSULT  SOCIAL WORK IP CONSULT  SWALLOW EVAL SPEECH PATH AT BEDSIDE IP CONSULT    Time Spent on this Encounter   I, Leela Miller, personally saw the patient today and spent greater than 30 minutes discharging this patient.    Discharge Orders   No discharge procedures on file.  Discharge Medications   Current Discharge Medication List      START taking these medications    Details   sulfamethoxazole-trimethoprim (BACTRIM DS/SEPTRA DS) 800-160 MG per tablet Take 1 tablet by mouth 2 times daily for 3 doses  Refills: 0    Associated Diagnoses: Urinary tract infection with hematuria, site unspecified         CONTINUE these medications which have CHANGED    Details   gabapentin (NEURONTIN) 100 MG capsule Take 1 capsule (100 mg) by mouth 2 times daily  Qty: 90 capsule, Refills: 3    Associated Diagnoses: Cancer of nipple of right breast (H); Neuropathy (H)      levETIRAcetam (KEPPRA) 500 MG tablet Take 1 tablet (500 mg) by mouth 2 times daily  Qty: 60 tablet, Refills: 1    Associated Diagnoses: Seizure disorder (H)      ondansetron (ZOFRAN) 8 MG tablet Take 1 tablet (8 mg) by mouth every 8 hours as needed (Nausea/Vomiting)  Qty: 10 tablet, Refills: 2    Associated Diagnoses: Cancer of nipple of right breast (H); Bone  metastases (H)      prochlorperazine (COMPAZINE) 10 MG tablet Take 0.5 tablets (5 mg) by mouth every 6 hours as needed for nausea or vomiting  Qty: 20 tablet, Refills: 1    Associated Diagnoses: Chemotherapy induced nausea and vomiting      docusate sodium (COLACE) 50 MG capsule Take 2 capsules (100 mg) by mouth 2 times daily as needed for constipation  Qty: 60 capsule    Associated Diagnoses: Constipation, unspecified constipation type      Multiple Vitamins-Minerals (CENTRUM) TABS Take 1 tablet by mouth daily  Qty: 30 tablet    Associated Diagnoses: Encounter for long-term current use of medication      ascorbic acid (VITAMIN C) 1000 MG TABS Take 0.5 tablets (500 mg) by mouth daily  Qty: 30 tablet    Associated Diagnoses: Encounter for long-term current use of medication      Vitamin D, Cholecalciferol, 1000 UNITS CAPS Take 2,000 Units by mouth daily    Associated Diagnoses: Bone metastases (H)      acetaminophen (TYLENOL) 325 MG tablet Take 2 tablets (650 mg) by mouth every 6 hours as needed for mild pain or fever  Qty: 100 tablet    Associated Diagnoses: Bone metastases (H)      calcium carbonate (OS-CARLIN 500 MG [place]. 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)      order for DME Cranial prosthesis  Qty: 1 Units, Refills: 1    Associated Diagnoses: Breast cancer, right breast (H); Bone metastases (H)         STOP taking these medications       LORazepam (ATIVAN) 0.5 MG tablet Comments:   Reason for Stopping:         amLODIPine (NORVASC) 5 MG tablet Comments:   Reason for Stopping:         diazepam (VALIUM) 5 MG tablet Comments:   Reason for Stopping:             Allergies   No Known Allergies  Data   Most Recent 3  CBC's:  Recent Labs   Lab Test  02/21/17   0837  02/20/17   0315  02/19/17   0808   WBC  2.2*  2.1*  1.8*   HGB  9.0*  7.8*  7.5*   MCV  101*  101*  100   PLT  68*  56*  48*  56*      Most Recent 3 BMP's:  Recent Labs   Lab Test  02/21/17   0837  02/20/17   0315  02/19/17   0808   NA  143  144  143   POTASSIUM  3.9  3.7  3.8  3.2*   CHLORIDE  113*  116*  112*   CO2  22  20  24   BUN  4*  3*  5*   CR  0.52  0.34*  0.38*   ANIONGAP  8  8  7   CARLIN  7.9*  7.5*  7.2*   GLC  76  94  88     Most Recent 2 LFT's:  Recent Labs   Lab Test  02/09/17   1308  01/26/17   1033   AST  22  19   ALT  28  22   ALKPHOS  68  64   BILITOTAL  0.8  0.9     Most Recent INR's and Anticoagulation Dosing History:  Anticoagulation Dose History     Recent Dosing and Labs Latest Ref Rng & Units 11/13/2013 9/25/2015 9/27/2015 9/29/2016 2/18/2017    INR 0.86 - 1.14 1.08 0.92 0.97 1.04 1.08        Most Recent 6 Bacteria Isolates From Any Culture (See EPIC Reports for Culture Details):  Recent Labs   Lab Test  02/19/17   0955  02/18/17   1507  02/18/17   0203  10/02/15   1700  10/02/15   1418  10/02/15   1413   CULT  50,000 to 100,000 colonies/mL Escherichia coli  10,000 to 50,000 colonies/mL Proteus mirabilis  *  No growth  No growth after 5 days  >100,000 colonies/mL Escherichia coli*  No growth  No growth     Most Recent TSH, T4 and A1c Labs:  Recent Labs   Lab Test  01/16/14   0810   TSH  1.17

## 2017-02-23 NOTE — PROGRESS NOTES
Social Work Services Discharge Note      Patient Name:  Venus Hyatt     Anticipated Discharge Date:  2/23/17    Discharge Disposition:   [facility] (ph: [phone], fax: [phone])    Following MD:  TCU MD     Pre-Admission Screening (PAS) online form has been completed.  The Level of Care (LOC) is:  Determined  Confirmation Code is:  [id number]  Patient/caregiver informed of referral to [facility] Line for Pre-Admission Screening for skilled nursing facility (SNF) placement and to expect a phone call post discharge from SNF.     Additional Services/Equipment Arranged:  EMILIA arranged for stretcher transport through [facility] () for 11:00am today, PCS form completed, and  aware of private pay costs     Patient / Family response to discharge plan:  Pt and  are agreeable to discharge plan     Persons notified of above discharge plan:  EMILIA spoke with Ed in admissions and confirmed discharge plan and transport time; EMILIA discussed with medical team; EMILIA provided bedside RN with contact information for TCU so she can complete hand-off to TCU RN    Staff Discharge Instructions:  Please fax discharge orders and signed hard scripts for any controlled substances.  Please print a packet and send with patient.   -EMILIA faxed discharge orders and PAS.    CTS Handoff completed:  YES    Amina Kirkland, [facility] CareSW for 7C  [phone] or [phone]

## 2017-02-24 NOTE — Clinical Note
Carolyn - I saw this patient for NP initial visit on 2/24. There is another admit coming tonight and I will leave for you to see next week. Hopefully this is OK with you.   Aliya

## 2017-02-24 NOTE — MR AVS SNAPSHOT
After Visit Summary   2/24/2017    Venus Hyatt    MRN: [id number]           Patient Information     Date Of Birth          1940        Visit Information        Provider Department      2/24/2017 9:00 AM Aliya Sinclair APRN CNP Geriatrics Transitional Care        Today's Diagnoses     Somnolence    -  1    Acute cystitis with hematuria        Acute right-sided weakness        Ductal carcinoma of breast, right (H)        Pancytopenia (H)        Physical deconditioning        Benign essential hypertension           Follow-ups after your visit        Your next 10 appointments already scheduled     Mar 06, 2017  2:45 PM CST   (Arrive by 2:30 PM)   Return Visit with Bernardo Liu MD   [facility] ([facility])    [address] [place] [address]  2nd Floor  [facility] [id number] [phone]            Mar 16, 2017  1:00 PM CDT   (Arrive by 12:45 PM)   Return Visit with Ken Mares MD   [facility] ([facility])    [address] [place] [address]  2nd Floor  [facility] [id number] [phone]            Mar 17, 2017  1:00 PM CDT   (Arrive by 12:45 PM)   Return Visit with Ken Ferguson MD   [facility] ([facility])    [address] [place] [address]  4th Floor  [facility] [id number] [phone]              Who to contact     If you have questions or need follow up information about today's clinic visit or your schedule please contact G[facility]AT[facility] directly at [phone].  Normal or non-critical lab and imaging results will be communicated to you by MyChart, letter or phone within 4 business days after the clinic has received the results. If you do not hear from us within 7 days, please contact the clinic through MyChart or phone. If you have a critical or abnormal lab result, we will notify you by phone as soon as possible.  Submit refill  "requests through Thing[employer] or call your pharmacy and they will forward the refill request to us. Please allow 3 business days for your refill to be completed.          Additional Information About Your Visit        Sp[employer] Information     [employer] gives you secure access to your electronic health record. If you see a primary care provider, you can also send messages to your care team and make appointments. If you have questions, please call your primary care clinic.  If you do not have a primary care provider, please call [phone] and they will assist you.        Care EveryWhere ID     This is your Care EveryWhere ID. This could be used by other organizations to access your [facility] medical records  [id number]        Your Vitals Were     Pulse Temperature Respirations Height Pulse Oximetry BMI (Body Mass Index)    90 97.8  F (36.6  C) 18 5' 2\" (1.575 m) 95% 17.19 kg/m2       Blood Pressure from Last 3 Encounters:   02/24/17 122/78   02/23/17 131/72   02/09/17 111/71    Weight from Last 3 Encounters:   02/24/17 94 lb (42.6 kg)   02/21/17 109 lb 14.4 oz (49.9 kg)   02/09/17 96 lb 3.2 oz (43.6 kg)              Today, you had the following     No orders found for display         Today's Medication Changes          These changes are accurate as of: 2/24/17  3:07 PM.  If you have any questions, ask your nurse or doctor.               These medicines have changed or have updated prescriptions.        Dose/Directions    sulfamethoxazole-trimethoprim 800-160 MG per tablet   Commonly known as:  BACTRIM DS/SEPTRA DS   This may have changed:  Another medication with the same name was removed. Continue taking this medication, and follow the directions you see here.   Changed by:  Aliya Sinclair, RENATE CNP        Dose:  1 tablet   Take 1 tablet by mouth 2 times daily For 2 days   Refills:  0                Primary Care Provider Office Phone # Fax #    Ken Ferguson [phone]       [facility] [address] [place] [address] [address]        Thank you!     Thank you for choosing [facility]AT[facility]  for your care. Our goal is always to provide you with excellent care. Hearing back from our patients is one way we can continue to improve our services. Please take a few minutes to complete the written survey that you may receive in the mail after your visit with us. Thank you!             Your Updated Medication List - Protect others around you: Learn how to safely use, store and throw away your medicines at www.disposemymeds.org.          This list is accurate as of: 2/24/17  3:07 PM.  Always use your most recent med list.                   Brand Name Dispense Instructions for use    acetaminophen 325 MG tablet    TYLENOL    100 tablet    Take 2 tablets (650 mg) by mouth every 6 hours as needed for mild pain or fever       ascorbic acid 1000 MG Tabs    vitamin C    30 tablet    Take 0.5 tablets (500 mg) by mouth daily       calcium carbonate 500 MG tablet    OS-CARLIN 500 mg [place]. [place]    90 tablet    Take 1 tablet (500 mg) by mouth daily       CENTRUM Tabs     30 tablet    Take 1 tablet by mouth daily       docusate sodium 50 MG capsule    COLACE    60 capsule    Take 2 capsules (100 mg) by mouth 2 times daily as needed for constipation       gabapentin 100 MG capsule    NEURONTIN    90 capsule    Take 1 capsule (100 mg) by mouth 2 times daily       levETIRAcetam 500 MG tablet    KEPPRA    60 tablet    Take 1 tablet (500 mg) by mouth 2 times daily       ondansetron 8 MG tablet    ZOFRAN    10 tablet    Take 1 tablet (8 mg) by mouth every 8 hours as needed (Nausea/Vomiting)       order for DME     1 Units    Cranial prosthesis       prochlorperazine 10 MG tablet    COMPAZINE    20 tablet    Take 0.5 tablets (5 mg) by mouth every 6 hours as needed for nausea or vomiting       sulfamethoxazole-trimethoprim 800-160 MG per tablet    BACTRIM DS/SEPTRA DS     Take 1 tablet by mouth 2 times daily For 2 days        Vitamin D (Cholecalciferol) 1000 UNITS Caps      Take 2,000 Units by mouth daily

## 2017-02-24 NOTE — PROGRESS NOTES
[facility]  PRIMARY CARE PROVIDER AND CLINIC:  Ken Ferguson [address] / [facility] [address]  Chief Complaint   Patient presents with     Hospital F/U       HPI:    Venus Hyatt is a 76 year old  (1940),admitted to the [facility] TCU  from [facility].  Hospital stay 2/17/17 through 2/23/17.  Admitted to this facility for  rehab, medical management and nursing care. Current issues are:       Change in mental status  Urinary tract infection with hematuria  Hospitalized due to weakness. Noted to have UA with large leukocyte esterase, >182 WBCs with clumps, few bacteria, trace blood; UCx growing out 50-100K E. coli and 10-50K Proteus mirabilis. Mental status is improved since admission. Etiology most likely related to UTI as no evidence of disease progression. MRI revealed stable disease. MRA without evidence of aneurysm, stenosis, or infarct. LP obtained, Cx pending. No PMNs or organisms seen on gram stain. MRI spine did not show evidence of mets in spine. Patient discharged to TCU for further rehabilitation. She will complete a five day course of bactrim DS BID.   --since admission last night continues to be sleepy but afebrile. I was not able to elicit a verbal response from patient today. At TCU for therapies,  reports that he is not sure what their discharge plan may be as he is caring for patient at home and may need more help or alternate living arrangement. She is typically up and about in a wheelchair and  has been able to transfer her on his own prior to this most recent change.     Acute right-sided weakness  Noticed prior to admission. Per Pt's  he is unsure whether she had weakness or if the patient just was unsure of what she was being asked to do given her mental status. MRI and MRA without sign of acute changes. She does have baseline weakness on left side from known brain  mets. Appears to be improving. Patient will discharge to a TCU for further rehabilitation.     Ductal carcinoma of breast, right (H)  Known brain metastasis with bone involvement as well. Most recently she is receiving Gemzar, carboplatin and Herceptin every 2 wks (last given on 2/9). Followed by Dr. Mares. She will continue oral Keppra for seizure ppx  --per  f/u with oncology in March, until then chemo on hold.     Pancytopenia (H)  Suspect secondary to primary marrow failure due to the patient's malignancy or 2/2 patient's prior chemotherapy, most recently on 2/9. CSF negative for HSV. HIV is non-reactive. Zinc is low. Counts improving.  Results for INOCENCIO MCDONALD (MRN [id number]) as of 2/24/2017 14:58   Ref. Range 2/19/2017 08:08 2/20/2017 03:15 2/20/2017 03:15 2/21/2017 08:37   WBC Latest Ref Range: 4.0 - 11.0 10e9/L 1.8 (L)  2.1 (L) 2.2 (L)   Hemoglobin Latest Ref Range: 11.7 - 15.7 g/dL 7.5 (L)  7.8 (L) 9.0 (L)   Hematocrit Latest Ref Range: 35.0 - 47.0 % 22.9 (L)  23.5 (L) 27.6 (L)   Platelet Count Latest Ref Range: 150 - 450 10e9/L 56 (L) 48 (LL) 56 (L) 68 (L)       Physical deconditioning  Patient was evaluated by physical therapy in the hospital due to decreased strength and balance below her baseline. Will discharge to a TCU for further rehabilitation.   --since admission remains weak and sleepy.     Benign essential hypertension   Patient previously on amlodipine. This was held on admission and was discontinued at discharge. If she is hypertensive, facility physician can consider resuming  --since admission last night Blood Pressures: 122-128/77-84 and Admission weight: 94.0 lb on 2/23/17. Discussed with , he is in agreement with holding Norvasc for now, will ask staff to update primary NP next week if BPs elevated or sooner PRN.   Lab Results   Component Value Date    CR 0.52 02/21/2017    CR 0.34 02/20/2017         CODE STATUS/ADVANCE DIRECTIVES DISCUSSION:   DNR / DNI  Patient's  living condition: lives with spouse    ALLERGIES:Review of patient's allergies indicates no known allergies.  PAST MEDICAL HISTORY:  has a past medical history of Cancer (H) and Hypertension.  PAST SURGICAL HISTORY:  has a past surgical history that includes appendectomy and Optical tracking system craniotomy, excise tumor, combined (Right, 9/28/2015).  FAMILY HISTORY: family history includes CANCER in her cousin and father.  SOCIAL HISTORY:  reports that she has never smoked. She has never used smokeless tobacco. She reports that she does not drink alcohol or use illicit drugs.    Post Discharge Medication Reconciliation Status: discharge medications reconciled, continue medications without change.  Current Outpatient Prescriptions   Medication Sig Dispense Refill     sulfamethoxazole-trimethoprim (BACTRIM DS/SEPTRA DS) 800-160 MG per tablet Take 1 tablet by mouth 2 times daily For 2 days       gabapentin (NEURONTIN) 100 MG capsule Take 1 capsule (100 mg) by mouth 2 times daily 90 capsule 3     levETIRAcetam (KEPPRA) 500 MG tablet Take 1 tablet (500 mg) by mouth 2 times daily 60 tablet 1     ondansetron (ZOFRAN) 8 MG tablet Take 1 tablet (8 mg) by mouth every 8 hours as needed (Nausea/Vomiting) 10 tablet 2     prochlorperazine (COMPAZINE) 10 MG tablet Take 0.5 tablets (5 mg) by mouth every 6 hours as needed for nausea or vomiting 20 tablet 1     docusate sodium (COLACE) 50 MG capsule Take 2 capsules (100 mg) by mouth 2 times daily as needed for constipation 60 capsule      Multiple Vitamins-Minerals (CENTRUM) TABS Take 1 tablet by mouth daily 30 tablet      ascorbic acid (VITAMIN C) 1000 MG TABS Take 0.5 tablets (500 mg) by mouth daily 30 tablet      Vitamin D, Cholecalciferol, 1000 UNITS CAPS Take 2,000 Units by mouth daily       acetaminophen (TYLENOL) 325 MG tablet Take 2 tablets (650 mg) by mouth every 6 hours as needed for mild pain or fever 100 tablet      calcium carbonate (OS-CARLIN 500 MG [place]. [place]) 500 MG  "tablet Take 1 tablet (500 mg) by mouth daily 90 tablet      order for DME Cranial prosthesis 1 Units 1       ROS:  Unobtainable secondary to cognitive impairment or aphasia.    Exam:  /78  Pulse 90  Temp 97.8  F (36.6  C)  Resp 18  Ht 5' 2\" (1.575 m)  Wt 94 lb (42.6 kg)  SpO2 95%  BMI 17.19 kg/m2  GENERAL APPEARANCE:  somnolent, in no distress, did not verbally respond to questions although opened her eyes when examined. Thin and frail  EYES:  EOM, lids, pupils and irises normal, sclera clear and conjunctiva normal, no discharge or mattering on lids or lashes noted  ENT:  Mouth normal, moist mucous membranes, nose normal without drainage or crusting, external ears without lesions, hearing acuity unable to assess.   NECK:  Nontender, supple, symmetrical; no adenopathy, masses or thyromegaly, trachea midline  RESP:  respiratory effort and palpation of chest normal, no chest wall tenderness, no respiratory distress, Lung sounds clear, patient is on room air  CV:  Palpation and auscultation of heart done, rate and rhythm controlled and regular, no murmur, no rub or gallop. Edema none bilateral lower extremities. VASCULAR: no open areas on extremities  ABDOMEN:  normal bowel sounds, soft, nontender, no grimacing or guarding with palpation, no hepatosplenomegaly or other masses  M/S:   Gait and station wheelchair bound, Digits and nails normal, no tenderness or swelling of the joints; generalized weakness.   NEURO: leaning to the right, did not verbally respond to examiner. Did not follow directions.  PSYCH:  insight and judgement, memory unable to assess due to no verbal response today.     Lab/Diagnostic data:   WBC   Date Value Ref Range Status   02/21/2017 2.2 (L) 4.0 - 11.0 10e9/L Final     Hemoglobin   Date Value Ref Range Status   02/21/2017 9.0 (L) 11.7 - 15.7 g/dL Final   02/20/2017 7.8 (L) 11.7 - 15.7 g/dL Final     Last Basic Metabolic Panel:  Lab Results   Component Value Date     02/21/2017    "   Lab Results   Component Value Date    POTASSIUM 3.9 02/21/2017     Lab Results   Component Value Date    CARLIN 7.9 02/21/2017     Lab Results   Component Value Date    CO2 22 02/21/2017     Lab Results   Component Value Date    BUN 4 02/21/2017     Lab Results   Component Value Date    CR 0.52 02/21/2017     Lab Results   Component Value Date    GLC 76 02/21/2017       ASSESSMENT/PLAN:  Change in mental status  Acute on chronic, known UTI and mets. Monitor. Sleepy today.     Urinary tract infection with hematuria  Acute onset, now on meds. Finish antibiotics and F/U PRN.     Acute right-sided weakness  Appears at baseline per . Monitor. Therapies.     Ductal carcinoma of breast, right (H)  Chronic issue with brain mets. Meds as above. F/U with oncology as planned.     Pancytopenia (H)  Chronic, due to metastatic cancer. F/U PRN.     Physical deconditioning  Acute on chronic, therapies as ordered.     Benign essential hypertension  Chronic, now doing fine off meds. Monitor, f/u next week.      Orders:  No new order.     Information reviewed:  Medications, vital signs, orders, nursing notes, problem list, hospital information. Total time spent with patient visit was 35 min including patient visit, review of past records and discussion of patient status and POC with staff. Greater than 50% of total time spent with counseling and coordinating care.    Electronically signed by:  RENATE Hooper CNP

## 2017-03-01 PROBLEM — R53.1 WEAKNESS OF LEFT SIDE OF BODY: Status: ACTIVE | Noted: 2017-01-01

## 2017-03-01 NOTE — PROGRESS NOTES
"[facility]    Chief Complaint   Patient presents with     RECHECK       HPI:    Venus Hyatt is a 76 year old  (1940), who is being seen today for an episodic care visit at [facility] . Today's concern is:     Acute cystitis with hematuria  Ductal carcinoma of breast, right (H)  Brain metastases (H)  Weakness of left side of body  Bone metastases (H)  Pancytopenia (H)  Somnolence: OT unable to complete cog eval due to drowsy at times  Physical deconditioning: pt not progressing well due to variable mentation/sleepiness/overall weakness. Likely done next week but no LCD      Not conversant but nods to questions and  assist with \"interpretation\".  No CP, SOB,  fevers, chills, HA, N/V, dysuria or Bowel Abnormalities( usually incont but occas can say that needs bathroom. Appetite is down but  feeds/assists..  No pain.  occas Cough lopez with eating/swallowing.    ALLERGIES: Review of patient's allergies indicates no known allergies.  Past Medical, Surgical, Family and Social History reviewed and updated in [facility].    Current Outpatient Prescriptions   Medication Sig Dispense Refill     gabapentin (NEURONTIN) 100 MG capsule Take 1 capsule (100 mg) by mouth 2 times daily 90 capsule 3     levETIRAcetam (KEPPRA) 500 MG tablet Take 1 tablet (500 mg) by mouth 2 times daily 60 tablet 1     ondansetron (ZOFRAN) 8 MG tablet Take 1 tablet (8 mg) by mouth every 8 hours as needed (Nausea/Vomiting) 10 tablet 2     prochlorperazine (COMPAZINE) 10 MG tablet Take 0.5 tablets (5 mg) by mouth every 6 hours as needed for nausea or vomiting 20 tablet 1     docusate sodium (COLACE) 50 MG capsule Take 2 capsules (100 mg) by mouth 2 times daily as needed for constipation 60 capsule      Multiple Vitamins-Minerals (CENTRUM) TABS Take 1 tablet by mouth daily 30 tablet      ascorbic acid (VITAMIN C) 1000 MG TABS Take 0.5 tablets (500 mg) by mouth daily 30 tablet      Vitamin D, Cholecalciferol, " "1000 UNITS CAPS Take 2,000 Units by mouth daily       acetaminophen (TYLENOL) 325 MG tablet Take 2 tablets (650 mg) by mouth every 6 hours as needed for mild pain or fever 100 tablet      calcium carbonate (OS-CARLIN 500 MG [place]. [place]) 500 MG tablet Take 1 tablet (500 mg) by mouth daily 90 tablet      order for DME Cranial prosthesis 1 Units 1     Medications reviewed:  Medications reconciled to facility chart and changes were made to reflect current medications as identified as above med list. Below are the changes that were made:   Medications stopped since last EPIC medication reconciliation:   There are no discontinued medications.    Medications started since last [facility] medication reconciliation:  No orders of the defined types were placed in this encounter.      REVIEW OF SYSTEMS:  Limited secondary to cognitive impairment or aphasia: but see under HPI above    Physical Exam:  /77  Pulse 105  Temp 98.1  F (36.7  C)  Resp 16  Ht 5' 2\" (1.575 m)  Wt 103 lb 9.6 oz (47 kg)  SpO2 100%  BMI 18.95 kg/m2     GENERAL APPEARANCE:  awake, in no distress, did not verbally respond to questions except with nods at times. Thin and frail  EYES:  EOM, lids, pupils and irises normal, sclera clear and conjunctiva normal  ENT:  Mouth with moist mucous membranes and oral secretions which she is not able to control  RESP:  respiratory effort  of chest normal,   no respiratory distress, Lung sounds clear but very decreased thru out.  CV:  Auscultation of heart done, RRR, no murmur, no rub or gallop.  No edema.  s  ABDOMEN:  normal bowel sounds, soft, nontender  M/S:   Seen in wheelchair bound, no movement on left side. Weak grasp with right hand.   SKIN:  Intact but limited exam as fully dressed in and in WC  NEURO:   Did not follow directions except to squeeze with right hand. Has direct eye contact, tried to talk.  PSYCH:  insight and judgement, memory unable to assess due to no verbal response today.     Lab/Diagnostic data: "     Recent Labs:      Last Basic Metabolic Panel:  Lab Results   Component Value Date     02/21/2017      Lab Results   Component Value Date    POTASSIUM 3.9 02/21/2017     Lab Results   Component Value Date    CHLORIDE 113 02/21/2017     Lab Results   Component Value Date    CARLIN 7.9 02/21/2017     Lab Results   Component Value Date    CO2 22 02/21/2017     Lab Results   Component Value Date    BUN 4 02/21/2017     Lab Results   Component Value Date    CR 0.52 02/21/2017     Lab Results   Component Value Date    GLC 76 02/21/2017     Lab Results   Component Value Date    WBC 2.2 02/21/2017     Lab Results   Component Value Date    RBC 2.73 02/21/2017     Lab Results   Component Value Date    HGB 9.0 02/21/2017     Lab Results   Component Value Date    HCT 27.6 02/21/2017     Lab Results   Component Value Date     02/21/2017     Lab Results   Component Value Date    MCH 33.0 02/21/2017     Lab Results   Component Value Date    MCHC 32.6 02/21/2017     Lab Results   Component Value Date    RDW 17.9 02/21/2017     Lab Results   Component Value Date    PLT 68 02/21/2017       ASSESSMENT / PLAN:  (N30.01) Acute cystitis with hematuria  (primary encounter diagnosis)  Comment: treated no new symptoms, cont monitor.    (C50.911) Ductal carcinoma of breast, right (H)   (C79.31) Brain metastases (H)   (M62.81) Weakness of left side of body   (C79.51) Bone metastases (H)  Comment: cont with f/u with oncology per their recs. Will check labs in am, see below    (D61.818) Pancytopenia (H)  Comment: checking labs in am and will fax to oncology.    (R40.0) Somnolence  Comment: better but waxes and wanes per staff and therapies.       (R53.81) Physical deconditioning  Comment: PT OT, variable progress to minimal.  I d/w  and he and pt hope she can regain her strength so he can take her home otherwise he is aware that he may need to place her in LTC setting.      Total time with patient visit: 40 minutes including  discussions about the POC and care coordination with the patient and family, . Greater than 50% of total time spent with counseling and coordinating care.      Electronically signed by  RENATE Morris CNP

## 2017-03-02 NOTE — PROGRESS NOTES
Spoke to patient's  requesting to see Dr Mares at an earlier appointment to discuss her current stay at a rehab unit that they're observation is no progression with rehab stay and will re-evaluate next week and patient's  would like to re-visit the hospice admission. Appointment arranged with Dr Mares March 7, 17 with lab work before appointment and will see Dr Liu on March 6, 17 for discussion as well.  Answered all patient 's questions and verbalized understanding. Alejandra Alvarado RN, BSN.

## 2017-03-02 NOTE — PROGRESS NOTES
Spoke with patient's  regarding upcoming appointment with Dr. Liu. Patient was recently admitted to hospital and had brain MRI while there. Confirmed this MRI would not need to be done again before Dr. Liu appointment.  is also wanting to discuss options, including hospice. States patient currently has low performance status and has not recovered since latest admission. Answered all of questions and he voiced understanding.

## 2017-03-05 NOTE — PROGRESS NOTES
HPI: Venus Hyatt was referred to our clinic for evaluation and treatment of her ER-negative, VA-negative, HER2 amplification-positive, invasive ductal carcinoma of the right breast with extensive local regional inflammatory involvement as well as distant metastatic disease.    Ms. Hyatt was in her usual state of good health until September 2013. She had not seen a physician for more than 10 years. She had not had prior mammograms for at least the past 10 years. She first noted changes in the right breast with some skin redness. These changes progressed until the nipple was effaced, and she sought medical attention in November after these changes had been present and progressing for more than two months. She finally went to the emergency room on November 13, 2013, with discomfort in the right chest wall. At that time, she had redness of the right arm and developed sepsis with Staphylococcus coagulase-negative with a cellulitis superinfection of the right breast as well as the right upper arm and right lower arm. She underwent a chest CT which showed right axillary and possibly left axillary lymphadenopathy, multiple pulmonary nodules, mediastinal and bilateral hilar lymphadenopathy, and multiple hepatic metastases. She did undergo a breast biopsy on November 14, 2013, which revealed an ER-negative, VA-negative, HER2-amplified breast cancer with the amplification ratio being greater than 5.9. There was no angiolymphatic invasion. The invasive ductal carcinoma was grade 3. No ductal carcinoma in situ was seen on the biopsy. She also was noted to have redness of the right half of her posterior torso. She had a right breast wound which grew heavy growth of beta-hemolytic Streptococcus group G. She also had right upper extremity edema. On the day of discharge, Venus was feeling relatively well. She had no fevers since admission. She was eating and drinking, her rash improved on empiric antibiotics, and she was  discharged on Keflex. The culture of the right breast grew heavy growth beta-hemolytic streptococcus group G. This isolate was presumed to be clindamycin resistant in detection of inducible clindamycin resistance. The organism was susceptible to penicillin as well as ceftriaxone, cefotaxime and ampicillin. The patient has no known drug allergies, and she was discharged on Keflex. She was seen in clinic and begun on the Wilma regimen of pertuzumab, trastuzumab and weekly paclitaxel substituted for docetaxel.      Therapies:  a. Pertuzumab, trastuzumab, weekly paclitaxel.  b. Kadcyla.  C. Capecitabine and trastuzumab.  - Gamma knife to cerebellar lesion 03/22/2016  D. Capecitabine and lapatinib 5-17-16  H. Trastuzumab and eribulin  I. Metronomic cyclophosphamide and methotrexate with trastuzumab  H. Gemcitabine, carboplatin every two weeks with trastuzumab.      INTERVAL HISTORY:  Venus Hyatt came to clinic today in a wheelchair, brought by her , Colt.  Venus has been alert, but poorly responsive.  She is sitting in the chair with her head down.  She is not responsive to verbal stimuli, but does appear to be awake.      I had a long discussion with Colt about his wife, Venus.  Colt has been incredibly attentive and very helpful in every way throughout Venus's illness.  It was a very tearful conversation.  Colt agreed that hospice would be the best option for Venus and she currently is at [facility] in [place].  We will put in an order for hospice care.  Venus appears to have lost a very significant amount of weight and has failure to thrive, but would be best taken care of in a hospice setting at the [facility] facility in [place].  I did talk with David, the nurse, on the phone and we had a conversation about Venus.  David had seen Venus yesterday and she is in agreement that hospice would be the best approach.  She recommended and agreed with [facility]  and we did put in an order for [facility] to see the patient at [facility] in [place].  All of Colt's questions were answered.  I commended him on the outstanding care that he provided for Venus over the years with her very difficult struggle with metastatic breast cancer.  All of Colt's questions were answered.  He felt confident that he could take Venus back to the [facility] facility in his car.  I did offer an ambulance, but he said that they had gotten to our clinic by car and they could get back by car without any difficulty. Please refer to [facility].         Thank you for allowing us to continue to participate in Venus Hyatt's care.          I spent 30 minutes with the patient more than 50% of which was in counseling and coordination of care.

## 2017-03-06 NOTE — NURSING NOTE
"Venus Hyatt is a 76 year old female who presents for:  Chief Complaint   Patient presents with     Oncology Clinic Visit     Patient with Brain tumor here for provider visit         Initial Vitals:  /78 (BP Location: Right arm, Patient Position: Chair, Cuff Size: Adult Small)  Pulse 94  Temp 97.8  F (36.6  C) (Oral)  Resp 20  SpO2 98% Estimated body mass index is 18.95 kg/(m^2) as calculated from the following:    Height as of 3/1/17: 1.575 m (5' 2\").    Weight as of 3/1/17: 47 kg (103 lb 9.6 oz).. There is no height or weight on file to calculate BSA. BP completed using cuff size: small regular  Data Unavailable No LMP recorded. Patient is postmenopausal. Allergies and medications reviewed.     Medications: Medication refills not needed today.  Pharmacy name entered into [employer]:    [facility] [id number] [place] [address] [id number] [facility] [address] [facility] [place], [place] - [address] [facility] [place], [place] - [address] [place] [address] [phone]    Comments:     5 minutes for nursing intake (face to face time)   Sonia Carter CMA        "

## 2017-03-06 NOTE — MR AVS SNAPSHOT
After Visit Summary   3/6/2017    Venus Hyatt    MRN: [id number]           Patient Information     Date Of Birth          1940        Visit Information        Provider Department      3/6/2017 2:45 PM Bernardo Liu MD [facility]        Today's Diagnoses     Brain metastases (H)    -  1       Follow-ups after your visit        Who to contact     If you have questions or need follow up information about today's clinic visit or your schedule please contact [facility] directly at [phone].  Normal or non-critical lab and imaging results will be communicated to you by CloudEndurehart, letter or phone within 4 business days after the clinic has received the results. If you do not hear from us within 7 days, please contact the clinic through Eldariont or phone. If you have a critical or abnormal lab result, we will notify you by phone as soon as possible.  Submit refill requests through Ooolala or call your pharmacy and they will forward the refill request to us. Please allow 3 business days for your refill to be completed.          Additional Information About Your Visit        MyChart Information     O[employer]ala gives you secure access to your electronic health record. If you see a primary care provider, you can also send messages to your care team and make appointments. If you have questions, please call your primary care clinic.  If you do not have a primary care provider, please call [phone] and they will assist you.        Care EveryWhere ID     This is your Care EveryWhere ID. This could be used by other organizations to access your [facility] medical records  [id number]        Your Vitals Were     Pulse Temperature Respirations Pulse Oximetry          94 97.8  F (36.6  C) (Oral) 20 98%         Blood Pressure from Last 3 Encounters:   03/10/17 115/70   03/07/17 117/76   03/07/17 100/67    Weight from Last 3 Encounters:   03/10/17 37.6 kg (83 lb)    03/07/17 39.3 kg (86 lb 11.2 oz)   03/07/17 38.4 kg (84 lb 9.6 oz)              Today, you had the following     No orders found for display       Primary Care Provider Office Phone # Fax #    Ken Ferguson [phone]       [address] [address]        Thank you!     Thank you for choosing [facility]  for your care. Our goal is always to provide you with excellent care. Hearing back from our patients is one way we can continue to improve our services. Please take a few minutes to complete the written survey that you may receive in the mail after your visit with us. Thank you!             Your Updated Medication List - Protect others around you: Learn how to safely use, store and throw away your medicines at www.disposemymeds.org.          This list is accurate as of: 3/6/17 11:59 PM.  Always use your most recent med list.                   Brand Name Dispense Instructions for use    acetaminophen 325 MG tablet    TYLENOL    100 tablet    Take 2 tablets (650 mg) by mouth every 6 hours as needed for mild pain or fever       levETIRAcetam 500 MG tablet    KEPPRA    60 tablet    Take 1 tablet (500 mg) by mouth 2 times daily       ondansetron 8 MG tablet    ZOFRAN    10 tablet    Take 1 tablet (8 mg) by mouth every 8 hours as needed (Nausea/Vomiting)       order for DME     1 Units    Cranial prosthesis       prochlorperazine 10 MG tablet    COMPAZINE    20 tablet    Take 0.5 tablets (5 mg) by mouth every 6 hours as needed for nausea or vomiting

## 2017-03-06 NOTE — LETTER
3/6/2017       RE: Venus Hyatt  [address]  [place] [address]     Dear Colleague,    Thank you for referring your patient, Venus Hyatt, to the [facility]. Please see a copy of my visit note below.    Please see dictated note #[id number].    HISTORY OF PRESENT ILLNESS:  Ms. Hyatt is a 76-year-old female seen in Neurosurgery Clinic for a 3-month followup of metastatic breast cancer, status post multiple treatments for brain metastases.  Most recent radiosurgery was performed 09/08/2016.  She was accompanied in clinic by her  who provided much of the history.  He reports that approximately 2 weeks ago she began to experience cognitive decline and confusion.  She was admitted to the hospital after it was thought she had a UTI.  This has been treated; however, due to overall deconditioning and malnutrition, she was transferred to TCU for rehabilitation following hospital stay.      The  discussed that she has been very weak recently and has not been eating much.  She also reports that she is limited verbal capacity secondary to weakness and overall declining cognition.  He is here today to discuss whether or not there is a clear explanation for this expectation in terms of recovery back recent prehospitalization baseline.  We had a lengthy discussion with the  regarding these and several other questions.  Ms. Hyatt will see her oncologist, Dr. Mares, tomorrow for an office visit.      PHYSICAL EXAMINATION:   GENERAL:  This is an elderly, cachectic appearing female sitting in a wheelchair in no acute distress.   STRENGTH:  She has profound left hemiparesis in upper and lower extremities.  She is still weak voice and difficult time vocalizing.      IMAGING:  MRI imaging of the brain acquired 02/18/2017 was reviewed.  This demonstrates no recurrence of metastatic disease or advancement tumor of burden.  No new lesions in the brain.      ASSESSMENT/PLAN:  Ms.  Edmar is a 76-year-old female status post multiple treatments for metastatic breast cancer.  Overall, her disease is stable at this point.  However, she continues to suffer cognitive decline and malnutrition, as well as deconditioning.  We discussed with the patient and her  that we would defer to Dr. Mares regarding the timing of the next brain MRI.  Of course, we always will be happy to see her back in Neurosurgery Clinic should she encounter a situation where she would like to be seen in clinic.       I saw the patient with the resident.  I have reviewed and edited the resident note and agree with the plan of care.       As dictated by JING LEONARD MD, PHD, PGY1 neurosurgery resident.       Bernardo Liu MD        D: 2017 13:04   T: 2017 08:10   MT: LENY      Name:     INOCENCIO MCDONALD   MRN:      [id number]        Account:      [id number]   :      1940           Service Date: 2017      Document: [id number]

## 2017-03-07 NOTE — MR AVS SNAPSHOT
After Visit Summary   3/7/2017    Venus Hyatt    MRN: [id number]           Patient Information     Date Of Birth          1940        Visit Information        Provider Department      3/7/2017 4:30 PM Ken Mares MD [facility]        Today's Diagnoses     Breast cancer, right breast        Bone metastases        Brain metastases        Cancer of nipple of right breast (H)           Follow-ups after your visit        Additional Services     HOSPICE REFERRAL       Patient is at [facility] and  [facility].  Please see ASAP.  I am reachable tomorrow AM until 9 AM cell [phone].  Ken Mares MD                  Who to contact     If you have questions or need follow up information about today's clinic visit or your schedule please contact [facility], located within [facility] directly at [phone].  Normal or non-critical lab and imaging results will be communicated to you by MyChart, letter or phone within 4 business days after the clinic has received the results. If you do not hear from us within 7 days, please contact the clinic through CityHourhart or phone. If you have a critical or abnormal lab result, we will notify you by phone as soon as possible.  Submit refill requests through Visual Networks or call your pharmacy and they will forward the refill request to us. Please allow 3 business days for your refill to be completed.          Additional Information About Your Visit        CityHourhart Information     Visual Networks gives you secure access to your electronic health record. If you see a primary care provider, you can also send messages to your care team and make appointments. If you have questions, please call your primary care clinic.  If you do not have a primary care provider, please call [phone] and they will assist you.        Care EveryWhere ID     This is your Care EveryWhere ID. This could be used by other organizations to access your  [facility] medical records  [id number]        Your Vitals Were     Pulse Temperature Respirations Pulse Oximetry BMI (Body Mass Index)       89 97  F (36.1  C) (Axillary) 16 95% 15.86 kg/m2        Blood Pressure from Last 3 Encounters:   No data found for BP    Weight from Last 3 Encounters:   No data found for Wt              Today, you had the following     No orders found for display       Primary Care Provider Office Phone # Fax #    Ken Ferguson [phone]       [facility] [address] [address]        Thank you!     Thank you for choosing [facility]  for your care. Our goal is always to provide you with excellent care. Hearing back from our patients is one way we can continue to improve our services. Please take a few minutes to complete the written survey that you may receive in the mail after your visit with us. Thank you!             Your Updated Medication List - Protect others around you: Learn how to safely use, store and throw away your medicines at www.disposemymeds.org.          This list is accurate as of: 3/7/17 11:59 PM.  Always use your most recent med list.                   Brand Name Dispense Instructions for use    acetaminophen 325 MG tablet    TYLENOL    100 tablet    Take 2 tablets (650 mg) by mouth every 6 hours as needed for mild pain or fever       ENSURE PO      Take 8 oz by mouth daily Give at 0900, 1300, 1800       levETIRAcetam 500 MG tablet    KEPPRA    60 tablet    Take 1 tablet (500 mg) by mouth 2 times daily       ondansetron 8 MG tablet    ZOFRAN    10 tablet    Take 1 tablet (8 mg) by mouth every 8 hours as needed (Nausea/Vomiting)       order for DME     1 Units    Cranial prosthesis       prochlorperazine 10 MG tablet    COMPAZINE    20 tablet    Take 0.5 tablets (5 mg) by mouth every 6 hours as needed for nausea or vomiting

## 2017-03-07 NOTE — PROGRESS NOTES
[facility]    Chief Complaint   Patient presents with     RECHECK       HPI:    Venus Hyatt is a 76 year old  (1940), who is being seen today for an episodic care visit at [facility] .   Today's concern is: pt is not progressing, not eating well.     Brain metastases (H):   Bone metastases  Weakness of left side of body  Physical deconditioning  Somnolence  Failure to thrive in adult      Patient non verbal at the time of exam. Able to nod head at some yes/no questions.  at bedside and relays information as able. No CP, SOB, Cough, dizziness, fevers, chills, HA, N/V, dysuria or Bowel Abnormalities. Poor appetite.  Denies pain.     ALLERGIES: Review of patient's allergies indicates no known allergies.  Past Medical, Surgical, Family and Social History reviewed and updated in [facility].    Current Outpatient Prescriptions   Medication Sig Dispense Refill     Nutritional Supplements (ENSURE PO) Take 8 oz by mouth 3 times daily Give at 0900, 1300, 1800       gabapentin (NEURONTIN) 100 MG capsule Take 1 capsule (100 mg) by mouth 2 times daily 90 capsule 3     levETIRAcetam (KEPPRA) 500 MG tablet Take 1 tablet (500 mg) by mouth 2 times daily 60 tablet 1     ondansetron (ZOFRAN) 8 MG tablet Take 1 tablet (8 mg) by mouth every 8 hours as needed (Nausea/Vomiting) 10 tablet 2     prochlorperazine (COMPAZINE) 10 MG tablet Take 0.5 tablets (5 mg) by mouth every 6 hours as needed for nausea or vomiting 20 tablet 1     docusate sodium (COLACE) 50 MG capsule Take 2 capsules (100 mg) by mouth 2 times daily as needed for constipation 60 capsule      Multiple Vitamins-Minerals (CENTRUM) TABS Take 1 tablet by mouth daily 30 tablet      ascorbic acid (VITAMIN C) 1000 MG TABS Take 0.5 tablets (500 mg) by mouth daily 30 tablet      Vitamin D, Cholecalciferol, 1000 UNITS CAPS Take 2,000 Units by mouth daily       acetaminophen (TYLENOL) 325 MG tablet Take 2 tablets (650 mg) by mouth every 6 hours  "as needed for mild pain or fever 100 tablet      calcium carbonate (OS-CARLIN 500 MG [place]) 500 MG tablet Take 1 tablet (500 mg) by mouth daily 90 tablet      order for DME Cranial prosthesis 1 Units 1     Medications reviewed:  Medications reconciled to facility chart and changes were made to reflect current medications as identified as above med list. Below are the changes that were made:   Medications stopped since last EPIC medication reconciliation:   There are no discontinued medications.    Medications started since last [facility] medication reconciliation:  Orders Placed This Encounter   Medications     Nutritional Supplements (ENSURE PO)     Sig: Take 8 oz by mouth 3 times daily Give at 0900, 1300, 1800         REVIEW OF SYSTEMS:  Unobtainable secondary to cognitive impairment or aphasia.     Physical Exam:  /67  Pulse 94  Temp 97.5  F (36.4  C)  Resp 19  Ht 5' 2\" (1.575 m)  Wt 84 lb 9.6 oz (38.4 kg)  SpO2 97%  BMI 15.47 kg/m2  GENERAL APPEARANCE:  somnolent, appears ill, opens eyes to voice/gentle shaking  ENT:  Mouth  With moist mucous membranes  EYES:  EOM, conjunctivae, lids, pupils and irises normal  RESP:  respiratory effort  of chest normal, no respiratory distress, diminished breath sounds   CV:  Palpation and auscultation of heart done, regular rate and rhythm, no murmur, rub, or gallop, no edema  CHEST (BREASTS):  left sided chest port  ABDOMEN:  normal bowel sounds, soft, nontender, no hepatosplenomegaly or other masses  M/S:   Bed bound at baseline. Participates in therapy as able. Unable to ambulate.   SKIN:  Fragile, frail skin. In tact.   NEURO:   Unable to follow commands.   PSYCH:  AMITA, nonverbal. Appears to follow some conversations.     Recent Labs:      Last Basic Metabolic Panel:  Lab Results   Component Value Date     03/02/2017      Lab Results   Component Value Date    POTASSIUM 4.1 03/02/2017     Lab Results   Component Value Date    CHLORIDE 111 03/02/2017     Lab Results "   Component Value Date    CARLIN 9.0 03/02/2017     Lab Results   Component Value Date    CO2 23 03/02/2017     Lab Results   Component Value Date    BUN 21 03/02/2017     Lab Results   Component Value Date    CR 0.40 03/02/2017     Lab Results   Component Value Date     03/02/2017     Lab Results   Component Value Date    WBC 2.1 03/02/2017     Lab Results   Component Value Date    RBC 3.27 03/02/2017     Lab Results   Component Value Date    HGB 10.6 03/02/2017     Lab Results   Component Value Date       Lab Results   Component Value Date     03/02/2017         Assessment/Plan:  Bone metastases (H)  Brain metastases (H)  Patient being followed by oncology with appointment this afternoon. Will follow care recommendations from them.  May be Hospice here and LTC as  not sure he can take her home to care for her again.    Weakness of left side of body  Physical deconditioning  Somnolence  Continue to work with therapies but likely to end in 1-2 days as not progressing.      Failure to thrive in adult  Minimal intake over the last 2 weeks per . Continue with supplementation and encouraged intake.         I Do Fournier RN, CNP have examined pt independent of student Roxana Jones, FLORENTINO Student .  I have  confirmed all histories, ROS, exams, assessments  and plans by myself. I  discussed case with student and any additions to his/her note as below: none.    Total time with patient visit: 40 minutes including discussions about the POC and care coordination with the patient and family, , Colt at bedside. Greater than 50% of total time spent with counseling and coordinating care.    Electronically signed by  RENATE Morris CNP

## 2017-03-07 NOTE — NURSING NOTE
"Venus Hyatt is a 76 year old female who presents for:  Chief Complaint   Patient presents with     Port Draw     Port labs     Oncology Clinic Visit     Return: Breast ca        Initial Vitals:  /76 (BP Location: Right arm, Patient Position: Chair, Cuff Size: Adult Small)  Pulse 89  Temp 97  F (36.1  C) (Axillary)  Resp 16  SpO2 95% Estimated body mass index is 15.47 kg/(m^2) as calculated from the following:    Height as of an earlier encounter on 3/7/17: 1.575 m (5' 2\").    Weight as of an earlier encounter on 3/7/17: 38.4 kg (84 lb 9.6 oz).. There is no height or weight on file to calculate BSA. BP completed using cuff size: BP was taken in LAB INTAKE  Data Unavailable No LMP recorded. Patient is postmenopausal. Allergies and medications reviewed.     Medications: Medication refills not needed today.  Pharmacy name entered into [facility]:    [facility] [id number] [place] [address] [id number] [place] [address] RD AT [facility] OF [place] [address] & [facility] [place], [place] - [address] [facility] [place], [place] - [address] [facility] [phone]    Comments: Patient received flu vaccine. See Immunizations.  Leesa Chamberlain CMA        6 minutes for nursing intake (face to face time)   Leesa Chamberlain CMA          "

## 2017-03-07 NOTE — NURSING NOTE
Labs drawn via port a cath.  Flushed with saline and heparin.  De-accessed and covered with gauze dressing.  VS done.  Pt tolerated well.  Pt noted to be stuporous, not responding to verbal stimuli.  Pt noted to be a DNR/DNI.  Dr. Mares notified of pt's mental status which, according to , is commonly the mental status she has been.      Evie Lema  RN

## 2017-03-07 NOTE — LETTER
3/7/2017       RE: Venus Hyatt  [address]  [place] [address]     Dear Colleague,    Thank you for referring your patient, Venus Hyatt, to the [facility]. Please see a copy of my visit note below.    HPI: Venus Hyatt was referred to our clinic for evaluation and treatment of her ER-negative, CO-negative, HER2 amplification-positive, invasive ductal carcinoma of the right breast with extensive local regional inflammatory involvement as well as distant metastatic disease.    Ms. Hyatt was in her usual state of good health until September 2013. She had not seen a physician for more than 10 years. She had not had prior mammograms for at least the past 10 years. She first noted changes in the right breast with some skin redness. These changes progressed until the nipple was effaced, and she sought medical attention in November after these changes had been present and progressing for more than two months. She finally went to the emergency room on November 13, 2013, with discomfort in the right chest wall. At that time, she had redness of the right arm and developed sepsis with Staphylococcus coagulase-negative with a cellulitis superinfection of the right breast as well as the right upper arm and right lower arm. She underwent a chest CT which showed right axillary and possibly left axillary lymphadenopathy, multiple pulmonary nodules, mediastinal and bilateral hilar lymphadenopathy, and multiple hepatic metastases. She did undergo a breast biopsy on November 14, 2013, which revealed an ER-negative, CO-negative, HER2-amplified breast cancer with the amplification ratio being greater than 5.9. There was no angiolymphatic invasion. The invasive ductal carcinoma was grade 3. No ductal carcinoma in situ was seen on the biopsy. She also was noted to have redness of the right half of her posterior torso. She had a right breast wound which grew heavy growth of beta-hemolytic  Streptococcus group G. She also had right upper extremity edema. On the day of discharge, Venus was feeling relatively well. She had no fevers since admission. She was eating and drinking, her rash improved on empiric antibiotics, and she was discharged on Keflex. The culture of the right breast grew heavy growth beta-hemolytic streptococcus group G. This isolate was presumed to be clindamycin resistant in detection of inducible clindamycin resistance. The organism was susceptible to penicillin as well as ceftriaxone, cefotaxime and ampicillin. The patient has no known drug allergies, and she was discharged on Keflex. She was seen in clinic and begun on the Wilma regimen of pertuzumab, trastuzumab and weekly paclitaxel substituted for docetaxel.      Therapies:  a. Pertuzumab, trastuzumab, weekly paclitaxel.  b. Kadcyla.  C. Capecitabine and trastuzumab.  - Gamma knife to cerebellar lesion 03/22/2016  D. Capecitabine and lapatinib 5-17-16  H. Trastuzumab and eribulin  I. Metronomic cyclophosphamide and methotrexate with trastuzumab  H. Gemcitabine, carboplatin every two weeks with trastuzumab.      INTERVAL HISTORY:  Venus Hyatt came to clinic today in a wheelchair, brought by her , Colt.  Venus has been alert, but poorly responsive.  She is sitting in the chair with her head down.  She is not responsive to verbal stimuli, but does appear to be awake.      I had a long discussion with Colt about his wife, Venus.  Colt has been incredibly attentive and very helpful in every way throughout Venus's illness.  It was a very tearful conversation.  Colt agreed that hospice would be the best option for Venus and she currently is at [facility] in [place].  We will put in an order for hospice care.  Venus appears to have lost a very significant amount of weight and has failure to thrive, but would be best taken care of in a hospice setting at the [facility] facility in [place].   I did talk with David, the nurse, on the phone and we had a conversation about Venus.  David had seen Venus yesterday and she is in agreement that hospice would be the best approach.  She recommended and agreed with [facility] and we did put in an order for [facility] to see the patient at [facility] in [place].  All of Colt's questions were answered.  I commended him on the outstanding care that he provided for Venus over the years with her very difficult struggle with metastatic breast cancer.  All of Colt's questions were answered.  He felt confident that he could take Venus back to the [facility] facility in his car.  I did offer an ambulance, but he said that they had gotten to our clinic by car and they could get back by car without any difficulty. Please refer to [facility].         Thank you for allowing us to continue to participate in Venus Hyatt's care.          I spent 30 minutes with the patient more than 50% of which was in counseling and coordination of care.     Again, thank you for allowing me to participate in the care of your patient.      Sincerely,    Ken Mares MD

## 2017-03-08 NOTE — PROGRESS NOTES
Per Dr Mares's recommendation to arrange for hospice care at [facility] in [place], [place]. Spoke to patient's nurse Simeon on the [facility] ([phone]) and is aware of patient to be on hospice cares. Nurse transferred writer to  Sarbjit Lee ([phone]) and has contacted [facility] to provide hospice cares to patient at the [facility] in [facility]. Current PMD at [facility] is aware of hospice request and patient's  Colt has patient DNR/DNI and is bringing patient's Health Care Directive to the [facility] today. Gave  contact information to writer for any further information.  Answered all patient  and 's questions and verbalized understanding. Alejandra Alvarado RN, BSN.

## 2017-03-08 NOTE — PROGRESS NOTES
HISTORY OF PRESENT ILLNESS:  Ms. Hyatt is a 76-year-old female seen in Neurosurgery Clinic for a 3-month followup of metastatic breast cancer, status post multiple treatments for brain metastases.  Most recent radiosurgery was performed 09/08/2016.  She was accompanied in clinic by her  who provided much of the history.  He reports that approximately 2 weeks ago she began to experience cognitive decline and confusion.  She was admitted to the hospital after it was thought she had a UTI.  This has been treated; however, due to overall deconditioning and malnutrition, she was transferred to TCU for rehabilitation following hospital stay.      The  discussed that she has been very weak recently and has not been eating much.  She also reports that she is limited verbal capacity secondary to weakness and overall declining cognition.  He is here today to discuss whether or not there is a clear explanation for this expectation in terms of recovery back recent prehospitalization baseline.  We had a lengthy discussion with the  regarding these and several other questions.  Ms. Hyatt will see her oncologist, Dr. Mares, tomorrow for an office visit.      PHYSICAL EXAMINATION:   GENERAL:  This is an elderly, cachectic appearing female sitting in a wheelchair in no acute distress.   STRENGTH:  She has profound left hemiparesis in upper and lower extremities.  She is still weak voice and difficult time vocalizing.      IMAGING:  MRI imaging of the brain acquired 02/18/2017 was reviewed.  This demonstrates no recurrence of metastatic disease or advancement tumor of burden.  No new lesions in the brain.      ASSESSMENT/PLAN:  Ms. Hyatt is a 76-year-old female status post multiple treatments for metastatic breast cancer.  Overall, her disease is stable at this point.  However, she continues to suffer cognitive decline and malnutrition, as well as deconditioning.  We discussed with the patient and her   that we would defer to Dr. Mares regarding the timing of the next brain MRI.  Of course, we always will be happy to see her back in Neurosurgery Clinic should she encounter a situation where she would like to be seen in clinic.       I saw the patient with the resident.  I have reviewed and edited the resident note and agree with the plan of care.      MD VANESSA Llanos MD       As dictated by JING LEONARD MD, PHD, PGY1 neurosurgery resident.             D: 2017 13:04   T: 2017 08:10   MT: LENY      Name:     INOCENCIO MCDONALD   MRN:      [id number]        Account:      [id number]   :      1940           Service Date: 2017      Document: [id number]

## 2017-03-10 PROBLEM — Z00.00 ROUTINE GENERAL MEDICAL EXAMINATION AT A HEALTH CARE FACILITY: Status: ACTIVE | Noted: 2017-01-01

## 2017-03-10 PROBLEM — Z51.5 HOSPICE CARE: Status: ACTIVE | Noted: 2017-01-01

## 2017-03-10 PROBLEM — R41.0 ACUTE DELIRIUM: Status: RESOLVED | Noted: 2017-01-01 | Resolved: 2017-01-01

## 2017-03-10 NOTE — PROGRESS NOTES
"[facility]  PRIMARY CARE PROVIDER AND CLINIC:  Ken Ferguson [place] [id number] [address] / [facility]*  Chief Complaint   Patient presents with     Establish Care       HPI:    Venus Hyatt is a 76 year old  (1940),admitted to the [facility]  from [facility].  Hospital stay 2/17/17 through 2/23/17. [facility] TCU stay 2/23/17 to 3/9/17.  Admitted to this facility for long term care,  medical management, nursing care and hospice. Current issues are:      Per Hospital Course:     \"Venus Hyatt was admitted on 2/17/2017. The following problems were addressed during her hospitalization:     #. Encephalopathy  #. Acute UTI with hematuria.   UA with large leukocyte esterase, >182 Wbcs with clumps, few bacteria, trace blood; UCx growing out 50-100K E. coli and 10-50K Proteus mirabilis. Mental status is improved since admission. Etiology most likely related to UTI as no evidence of disease progression. MRI revealed stable disease. MRA without evidence of aneurysm, stenosis, or infarct. LP obtained, Cx pending. No PMNs or organisms seen on gram stain. MRI spine did not show evidence of mets in spine. Patient discharged to TCU for further rehabilitation. She will complete a five day course of bactrim DS BID.      #. Possible acute right-sided weakness. Noticed prior to admission. Per Pt's  he is unsure whether she had weakness or if the patient just was unsure of what she was being asked to do given her mental status. MRI and MRA without sign of acute changes. She does have baseline weakness on left side from known brain mets. Appears to be improving. Patient will discharge to a TCU for further rehabilitation.       #. 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 Known brain metastasis with bone involvement as well. Most recently she is receiving Gemzar, carboplatin and Herceptin every 2 wks (last given on 2/9). " "Followed by Dr. Mares. She will continue oral Keppra for seizure ppx      #. Pancytopenia. Suspect secondary to primary marrow failure due to the patient's malignancy or 2/2 patient's prior chemotherapy, most recently on 2/9. CSF negative for HSV. HIV is non-reactive. Zinc is low. Counts improving.     #. Deconditioning. Patient was evaluated by physical therapy in the hospital due to decreased strength and balance below her baseline. Will discharge to a TCU for further rehabilitation.      #. Hypertension. Patient previously on amlodipine. This was held on admission and was discontinued at discharge. If she is hypertensive, facility physician can consider resuming.\"    Pt was in TCU from 2/23 to 3/9/17 and did not progress with therapies.  Her  felt he could not take her home this time. Also after appts with Radiation oncology and oncology, it was rec'd that she enter Hospice,  agreed.    She is non verbal today, did not nod head to questions. cooperative with exam. Had direct eye contact.  No discomfort per family or staff. Conts to eat poorly.        CODE STATUS/ADVANCE DIRECTIVES DISCUSSION:   DNR / DNI--new POLST signed today  Patient's living condition: Previously lived with spouse    ALLERGIES:Review of patient's allergies indicates no known allergies.  PAST MEDICAL HISTORY:  has a past medical history of Cancer (H) and Hypertension.  PAST SURGICAL HISTORY:  has a past surgical history that includes appendectomy and Optical tracking system craniotomy, excise tumor, combined (Right, 9/28/2015).  FAMILY HISTORY: family history includes CANCER in her cousin and father.  SOCIAL HISTORY:  reports that she has never smoked. She has never used smokeless tobacco. She reports that she does not drink alcohol or use illicit drugs.    Post Discharge Medication Reconciliation Status: discharge medications reconciled and changed, per note/orders (see AVS).  Current Outpatient Prescriptions   Medication Sig " Dispense Refill     morphine sulfate 20 MG/5ML SOLN Take 2.5 mg by mouth every 8 hours Schedule morning dose 1/2-1 hr prior to AM cares       levETIRAcetam (KEPPRA) 100 MG/ML solution Take 500 mg by mouth 2 times daily       Nutritional Supplements (ENSURE PO) Take 8 oz by mouth daily Give at 0900, 1300, 1800        ondansetron (ZOFRAN) 8 MG tablet Take 1 tablet (8 mg) by mouth every 8 hours as needed (Nausea/Vomiting) 10 tablet 2     prochlorperazine (COMPAZINE) 10 MG tablet Take 0.5 tablets (5 mg) by mouth every 6 hours as needed for nausea or vomiting 20 tablet 1     acetaminophen (TYLENOL) 325 MG tablet Take 2 tablets (650 mg) by mouth every 6 hours as needed for mild pain or fever 100 tablet      [DISCONTINUED] levETIRAcetam (KEPPRA) 500 MG tablet Take 1 tablet (500 mg) by mouth 2 times daily (Patient not taking: Reported on 3/10/2017) 60 tablet 1     order for DME Cranial prosthesis 1 Units 1       ROS:  Unobtainable secondary to cognitive impairment or aphasia.    Exam:  /70  Pulse 92  Temp 97.5  F (36.4  C)  Resp 14  Wt 83 lb (37.6 kg)  SpO2 93%  BMI 15.18 kg/m2     GENERAL APPEARANCE:  awake, in no distress, did not verbally respond to questions. Thin and frail  EYES:  EOM, lids, pupils and irises normal, sclera clear and conjunctiva normal  ENT:  Mouth with moist mucous membranes intact.  RESP:  respiratory effort  of chest normal, no respiratory distress, Lung sounds clear but very decreased thru out.  CV:  Auscultation of heart done, RRR, no murmur, no rub or gallop.  No edema.    ABDOMEN:  normal bowel sounds, soft, nontender  M/S:   Seen in  Bed, no movement on left side.  Weak on right side  SKIN:  Intact but limited exam as fully dressed in bed.  NEURO:   Did not follow directions today. Has direct eye contact, tried to talk.  PSYCH:  insight and judgement, memory unable to assess due to no verbal response today.      Lab/Diagnostic data:     WBC   Date Value Ref Range Status   03/07/2017 4.6  4.0 - 11.0 10e9/L Final     Hemoglobin   Date Value Ref Range Status   03/07/2017 11.0 (L) 11.7 - 15.7 g/dL Final   03/02/2017 10.6 (L) 11.8 - 15.5 g/dL Final     Last Basic Metabolic Panel:  Lab Results   Component Value Date     03/07/2017      Lab Results   Component Value Date    POTASSIUM 3.8 03/07/2017     Lab Results   Component Value Date    CARLIN 9.0 03/07/2017     Lab Results   Component Value Date    CO2 25 03/07/2017     Lab Results   Component Value Date    BUN 21 03/07/2017     Lab Results   Component Value Date    CR 0.33 03/07/2017     Lab Results   Component Value Date    GLC 92 03/07/2017     Lab Results   Component Value Date    WBC 4.6 03/07/2017     Lab Results   Component Value Date    RBC 3.34 03/07/2017     Lab Results   Component Value Date    HGB 11.0 03/07/2017     Lab Results   Component Value Date    HCT 34.1 03/07/2017     Lab Results   Component Value Date     03/07/2017     Lab Results   Component Value Date    MCH 32.9 03/07/2017     Lab Results   Component Value Date    MCHC 32.3 03/07/2017     Lab Results   Component Value Date    RDW 15.3 03/07/2017     Lab Results   Component Value Date     03/07/2017         ASSESSMENT / PLAN:  (C50.011) Cancer of nipple of right breast (H)  (primary encounter diagnosis)   (C79.51) Bone metastasis (H)   (C79.31) Brain metastases (H)  Comment: Hospice care, goal is comfort. Dc unnecessary meds. monitor.    (Z51.5) Hospice care  Comment: see above, appreciate Hospice care    (M62.81) Weakness of left side of body  Comment: at baseline, monitor     (R62.7) Failure to thrive in adult  Comment: minimal intake, monitor.     (I10) Benign essential hypertension  Comment: on no meds, normotensive    (R53.81) Physical deconditioning  Comment: therapies ended yest        Information reviewed:  Medications, vital signs, orders, nursing notes, problem list, hospital information. Total time spent with patient visit was 60 minutes including  patient visit and review of past records and d/w , . Greater than 50% of total time spent with counseling and coordinating care.    Electronically signed by:  RENATE Morris CNP

## 2017-03-29 PROBLEM — S61.219A CUT OF FINGER: Status: ACTIVE | Noted: 2017-01-01

## 2017-03-29 NOTE — PROGRESS NOTES
"Cc: pt had finger nails cut too short bu  and cit finger tips on  Left hand rist finger tip and right hand smallest finger tip.  also has 3 blisters on inside of left wrist.    /66  Pulse 72  Temp 98.2  F (36.8  C)  Resp 12  Ht 5' 2\" (1.575 m)  Wt 93 lb 9.6 oz (42.5 kg)  SpO2 96%  BMI 17.12 kg/m2    EXAM: pt eating lunch, arouses easily, states in soft voice is fine, no pain.  SKIN: warm dry: dried bloody spots on tips of fingers as noted above.  3 blisters inside left wrist with no redness, no open areas, no warmth.      ASSESSMENT / PLAN:  (S50.322A,  S50.822A,  S60.822A) Blister of left elbow, forearm, and wrist with infection, initial encounter  (primary encounter diagnosis)  Comment: d/w  and HOspice nurse: may be pressure from other wrist on that area. Will monitor.    (V71.809A) Cut of finger  Comment: change to band aids, monitor. Should cont to heal.    Total time with patient visit: 15 minutes including discussions about the POC and care coordination with the patient and family,   . Greater than 50% of total time spent with counseling and coordinating care      Do Fournier RN, CNP.          "

## 2017-04-26 NOTE — PROGRESS NOTES
Venus Hyatt is a 76 year old female seen April 26, 2017 at [facility] where she has resided for 2 months (admit 2/2017) seen to follow up breast cancer with brain mets.  Patient is seen in her room, up to , with her  present and he provides history.     Patient initially dx'd with right breast cancer in 2013, with recurrence in 2015.    Imaging showed several brain lesions, largest one in posterior right frontal lobe with subsequent left hemiparesis.   Debulking surgery done 9/2015 with partial resection through right frontotemporal craniotomy.   She also had a gamma knife procedure to a cerebellar lesion in 3/2016, and has had multiple courses of CTX, last one 2/9/17.     Patient went on Hospice on Dr Mares's recommendation in March 2017.     She was last hospitalized in February for change in MS and UTI with hematuria.   She is on Keppra, but no recent seizures.   She is taking in very little and has lost considerable weight.   No spontaneous movement, tries to say something, but voice to soft to hear.       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  Oncologist Dr Mares.      Seizure prophylaxis  Anxiety      Past Surgical History:   Procedure Laterality Date     APPENDECTOMY       OPTICAL TRACKING SYSTEM CRANIOTOMY, EXCISE TUMOR, COMBINED Right 9/28/2015    Procedure: COMBINED OPTICAL TRACKING SYSTEM CRANIOTOMY, EXCISE TUMOR;  Surgeon: Gracie Gorman MD;  Location:  OR       Family History   Problem Relation Age of Onset     CANCER Father      prostate, primary brain also     CANCER Cousin      pt does not know type       Social History   Substance Use Topics     Smoking status: Never Smoker     Smokeless tobacco: Never Used     Alcohol use No      SH:  Previously lived with  benjamin Gregory in [place].     Review Of Systems  Skin: negative   Eyes: impaired vision  Ears/Nose/Throat: hearing loss  Respiratory: No shortness of breath, dyspnea  on exertion, cough, or hemoptysis  Cardiovascular: negative  Gastrointestinal: poor appetite and weight loss  Genitourinary: incontinence  Musculoskeletal: generalized weakness, WC bound with total assist for transfers and care.   Neurologic:waxing and waning mental status, limited verbal skills  Psychiatric: anxiety  Hematologic/Lymphatic/Immunologic: pancytopenia related to CTX  Endocrine: negative      GENERAL APPEARANCE: alert and no distress, cachectic  /74  Pulse 88  Temp 97.9  F (36.6  C)  Resp 22  Wt 80 lb 12.8 oz (36.7 kg)  SpO2 98%  BMI 14.78 kg/m2   HEENT: normocephalic, no lesion or abnormalities  NECK: no adenopathy, no asymmetry, masses, or scars and thyroid normal to palpation, +SC and temporal wasting  RESP: lungs clear to auscultation - no rales, rhonchi or wheezes  CV: regular rate and rhythm, normal S1 S2  ABDOMEN:  soft, nontender, no HSM or masses and bowel sounds normal  MS: severe sarcopenia, contracture of left hand.     SKIN: no suspicious lesions or rashes  NEURO: Normal strength and tone, sensory exam grossly normal, and speech normal  PSYCH: affect flat  LYMPHATICS: No cervical,  or supraclavicular nodes     Lab Results   Component Value Date    WBC 4.6 03/07/2017     Lab Results   Component Value Date    HGB 11.0 03/07/2017     Lab Results   Component Value Date     03/07/2017     Lab Results   Component Value Date     03/07/2017      Last Basic Metabolic Panel:  Lab Results   Component Value Date     03/07/2017      Lab Results   Component Value Date    POTASSIUM 3.8 03/07/2017     Lab Results   Component Value Date    CHLORIDE 110 03/07/2017     Lab Results   Component Value Date    CARLIN 9.0 03/07/2017     Lab Results   Component Value Date    CO2 25 03/07/2017     Lab Results   Component Value Date    BUN 21 03/07/2017     Lab Results   Component Value Date    CR 0.33 03/07/2017     Lab Results   Component Value Date    GLC 92 03/07/2017        IMP/PLAN:  (C50.919,  C79.9) Metastatic breast cancer (H)    (C79.31) Brain metastases (H)  Comment: progression despite aggressive tx  (Z51.5) Hospice care  Comment: for failure to thrive in face of metastatic ca  Plan: reviewed with .   Comfort meds available and can add to those if needed.       (G40.909) Seizure disorder (H)  Comment: no recent sz / prophylaxis  Plan: with weight loss, will decrease Keppra dose to 250 mg bid.       (M62.81) Generalized muscle weakness  Comment: with sarcopenia  Plan: LTC assist for all cares, transfers, mobility and activity.        Keyanna Valdez MD

## 2017-04-27 PROBLEM — S61.219A CUT OF FINGER: Status: RESOLVED | Noted: 2017-01-01 | Resolved: 2017-01-01

## 2017-05-17 NOTE — PROGRESS NOTES
Venus Hyatt is a 76 year old female seen May 17, 2017 at [facility] where she has resided for 3 months (admit 2/2017) seen to follow up breast cancer with brain mets.  Patient is seen in her room, unresponsive with snoring respirations.    Nursing reports decline over this day, no po intake.    Patient initially dx'd with right breast cancer in 2013, with recurrence in 2015.    Imaging showed several brain lesions, largest one in posterior right frontal lobe with subsequent left hemiparesis.   Debulking surgery done 9/2015 with partial resection through right frontotemporal craniotomy.   She also had a gamma knife procedure to a cerebellar lesion in 3/2016, and has had multiple courses of CTX, last one 2/9/17.     Patient went on Hospice on Dr Mares's recommendation in March 2017.     She was last hospitalized in February for change in MS and UTI with hematuria.   She is on Keppra, but no recent seizures.   She is taking in very little and has lost considerable weight.   No spontaneous movement, and now unresponsive, appears to be transitioning to actively dying.       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  Oncologist Dr Mares.      Seizure prophylaxis  Anxiety      Past Surgical History:   Procedure Laterality Date     APPENDECTOMY       OPTICAL TRACKING SYSTEM CRANIOTOMY, EXCISE TUMOR, COMBINED Right 9/28/2015    Procedure: COMBINED OPTICAL TRACKING SYSTEM CRANIOTOMY, EXCISE TUMOR;  Surgeon: Gracie Gorman MD;  Location:  OR       Family History   Problem Relation Age of Onset     CANCER Father      prostate, primary brain also     CANCER Cousin      pt does not know type       Social History   Substance Use Topics     Smoking status: Never Smoker     Smokeless tobacco: Never Used     Alcohol use No      SH:  Previously lived with  benjamin Gregory in [place].     Review Of Systems: unable to obtain, as above.      GENERAL APPEARANCE: cachectic,  unresponsive  BP 92/49  Pulse 88  Temp 98.9  F (37.2  C)  Wt 74 lb 6.4 oz (33.7 kg)  SpO2 91%  BMI 13.61 kg/m2   Snoring respirations  Lungs decreased BS  Heart tachy RRR   Abd soft, NT, no distention, +BS  Ext with sarcopenia, no edema.     No recent labs    IMP/PLAN:  (C50.919,  C79.9) Metastatic breast cancer (H)    (C79.31) Brain metastases (H)  Comment: progression despite aggressive tx  (Z51.5) Hospice care  Comment: for failure to thrive in face of metastatic ca  Plan:   Comfort meds available and can add to those if needed.     Talked with  Colt about patient transitioning today.      (G40.909) Seizure disorder (H)  Comment: no recent sz / prophylaxis  Plan: comfort care as above.       (M62.81) Generalized muscle weakness  Comment: with sarcopenia  Plan: LTC assist for all cares, transfers, mobility and activity.        Keyanna Valdez MD

## 2017-05-21 NOTE — PROGRESS NOTES
Notifying Dr Valdez and care team that Venus passed away 5/21/17 at 4:05pm at [facility].  Peaceful death reported

## 2017-05-26 ENCOUNTER — TELEPHONE (OUTPATIENT)
Dept: ONCOLOGY | Facility: CLINIC | Age: 77
End: 2017-05-26

## 2017-05-26 NOTE — TELEPHONE ENCOUNTER
I called [facility]t and expressed our sincerest condolences.  I commended him on his dedication to Venus.  He thanked our team for our efforts on Venus's behalf.     Ken Mares MD

## (undated) RX ORDER — FENTANYL CITRATE 50 UG/ML
INJECTION, SOLUTION INTRAMUSCULAR; INTRAVENOUS
Status: DISPENSED
Start: 2017-01-01